# Patient Record
Sex: FEMALE | Race: WHITE | NOT HISPANIC OR LATINO | Employment: OTHER | URBAN - METROPOLITAN AREA
[De-identification: names, ages, dates, MRNs, and addresses within clinical notes are randomized per-mention and may not be internally consistent; named-entity substitution may affect disease eponyms.]

---

## 2017-02-16 ENCOUNTER — OFFICE VISIT (OUTPATIENT)
Dept: OBSTETRICS AND GYNECOLOGY | Age: 67
End: 2017-02-16

## 2017-02-16 DIAGNOSIS — Z01.419 WELL WOMAN EXAM WITH ROUTINE GYNECOLOGICAL EXAM: Primary | ICD-10-CM

## 2017-02-16 DIAGNOSIS — Z79.890 POST-MENOPAUSE ON HRT (HORMONE REPLACEMENT THERAPY): ICD-10-CM

## 2017-02-16 DIAGNOSIS — A59.01 TRICHOMONAS VAGINITIS: ICD-10-CM

## 2017-02-16 DIAGNOSIS — Z78.0 MENOPAUSE: ICD-10-CM

## 2017-02-16 DIAGNOSIS — Z11.51 SPECIAL SCREENING EXAMINATION FOR HUMAN PAPILLOMAVIRUS (HPV): ICD-10-CM

## 2017-02-16 LAB
BILIRUB BLD-MCNC: NEGATIVE MG/DL
GLUCOSE UR STRIP-MCNC: NEGATIVE MG/DL
KETONES UR QL: NEGATIVE
LEUKOCYTE EST, POC: ABNORMAL
NITRITE UR-MCNC: NEGATIVE MG/ML
PH UR: 6 [PH] (ref 5–8)
PROT UR STRIP-MCNC: NEGATIVE MG/DL
RBC # UR STRIP: ABNORMAL /UL
SP GR UR: 1.03 (ref 1–1.03)
UROBILINOGEN UR QL: NORMAL

## 2017-02-16 PROCEDURE — 99397 PER PM REEVAL EST PAT 65+ YR: CPT | Performed by: OBSTETRICS & GYNECOLOGY

## 2017-02-16 PROCEDURE — 81003 URINALYSIS AUTO W/O SCOPE: CPT | Performed by: OBSTETRICS & GYNECOLOGY

## 2017-02-16 RX ORDER — PRAVASTATIN SODIUM 40 MG
40 TABLET ORAL DAILY
COMMUNITY
End: 2021-07-24 | Stop reason: HOSPADM

## 2017-02-16 RX ORDER — ASPIRIN 81 MG/1
81 TABLET ORAL
Status: ON HOLD | COMMUNITY
End: 2018-04-22

## 2017-02-16 RX ORDER — FENOFIBRATE 160 MG/1
160 TABLET ORAL
Status: ON HOLD | COMMUNITY
End: 2018-04-22

## 2017-02-16 RX ORDER — ESTRADIOL 0.05 MG/D
PATCH TRANSDERMAL
Qty: 12 PATCH | Refills: 3 | Status: SHIPPED | OUTPATIENT
Start: 2017-02-16 | End: 2018-02-26 | Stop reason: SDUPTHER

## 2017-02-16 RX ORDER — ESTRADIOL 0.05 MG/D
PATCH TRANSDERMAL
Qty: 12 PATCH | Refills: 3 | Status: SHIPPED | OUTPATIENT
Start: 2017-02-16 | End: 2017-02-16 | Stop reason: SDUPTHER

## 2017-02-20 LAB
CYTOLOGIST CVX/VAG CYTO: NORMAL
CYTOLOGY CVX/VAG DOC THIN PREP: NORMAL
DX ICD CODE: NORMAL
DX ICD CODE: NORMAL
HIV 1 & 2 AB SER-IMP: NORMAL
HPV I/H RISK 1 DNA CVX QL PROBE+SIG AMP: NEGATIVE
OTHER STN SPEC: NORMAL
PATH REPORT.FINAL DX SPEC: NORMAL
STAT OF ADQ CVX/VAG CYTO-IMP: NORMAL

## 2017-02-21 ENCOUNTER — TELEPHONE (OUTPATIENT)
Dept: OBSTETRICS AND GYNECOLOGY | Age: 67
End: 2017-02-21

## 2017-02-21 NOTE — TELEPHONE ENCOUNTER
----- Message from Braulio Lewis MD sent at 2/20/2017  9:08 PM EST -----  Notify that the Pap smear was negative and the high risk HPV testing was negative.  Trichomonas was also seen as expected.

## 2017-03-05 PROBLEM — A59.01 TRICHOMONAS VAGINITIS: Status: ACTIVE | Noted: 2017-03-05

## 2018-01-22 ENCOUNTER — TELEPHONE (OUTPATIENT)
Dept: OBSTETRICS AND GYNECOLOGY | Age: 68
End: 2018-01-22

## 2018-02-23 ENCOUNTER — TELEPHONE (OUTPATIENT)
Dept: OBSTETRICS AND GYNECOLOGY | Age: 68
End: 2018-02-23

## 2018-02-26 RX ORDER — ESTRADIOL 0.05 MG/D
1 PATCH TRANSDERMAL WEEKLY
Qty: 12 PATCH | Refills: 3 | Status: ON HOLD | OUTPATIENT
Start: 2018-02-26 | End: 2018-04-22

## 2018-04-21 ENCOUNTER — HOSPITAL ENCOUNTER (INPATIENT)
Facility: HOSPITAL | Age: 68
LOS: 1 days | Discharge: HOME | End: 2018-04-23
Attending: EMERGENCY MEDICINE | Admitting: HOSPITALIST

## 2018-04-21 ENCOUNTER — APPOINTMENT (OUTPATIENT)
Dept: GENERAL RADIOLOGY | Facility: HOSPITAL | Age: 68
End: 2018-04-21

## 2018-04-21 DIAGNOSIS — I21.4 NON-STEMI (NON-ST ELEVATED MYOCARDIAL INFARCTION) (HCC): Primary | ICD-10-CM

## 2018-04-21 LAB
ALBUMIN SERPL-MCNC: 3.5 G/DL (ref 3.5–5.2)
ALBUMIN/GLOB SERPL: 1 G/DL
ALP SERPL-CCNC: 61 U/L (ref 39–117)
ALT SERPL W P-5'-P-CCNC: 17 U/L (ref 1–33)
ANION GAP SERPL CALCULATED.3IONS-SCNC: 12.8 MMOL/L
AST SERPL-CCNC: 16 U/L (ref 1–32)
BASOPHILS # BLD AUTO: 0.03 10*3/MM3 (ref 0–0.2)
BASOPHILS NFR BLD AUTO: 0.3 % (ref 0–1.5)
BILIRUB SERPL-MCNC: 0.2 MG/DL (ref 0.1–1.2)
BUN BLD-MCNC: 16 MG/DL (ref 8–23)
BUN/CREAT SERPL: 17.6 (ref 7–25)
CALCIUM SPEC-SCNC: 8.9 MG/DL (ref 8.6–10.5)
CHLORIDE SERPL-SCNC: 103 MMOL/L (ref 98–107)
CO2 SERPL-SCNC: 25.2 MMOL/L (ref 22–29)
CREAT BLD-MCNC: 0.91 MG/DL (ref 0.57–1)
DEPRECATED RDW RBC AUTO: 47.4 FL (ref 37–54)
EOSINOPHIL # BLD AUTO: 0.17 10*3/MM3 (ref 0–0.7)
EOSINOPHIL NFR BLD AUTO: 1.7 % (ref 0.3–6.2)
ERYTHROCYTE [DISTWIDTH] IN BLOOD BY AUTOMATED COUNT: 13.3 % (ref 11.7–13)
GFR SERPL CREATININE-BSD FRML MDRD: 61 ML/MIN/1.73
GLOBULIN UR ELPH-MCNC: 3.6 GM/DL
GLUCOSE BLD-MCNC: 185 MG/DL (ref 65–99)
HCT VFR BLD AUTO: 46.1 % (ref 35.6–45.5)
HGB BLD-MCNC: 14.8 G/DL (ref 11.9–15.5)
HOLD SPECIMEN: NORMAL
HOLD SPECIMEN: NORMAL
IMM GRANULOCYTES # BLD: 0.03 10*3/MM3 (ref 0–0.03)
IMM GRANULOCYTES NFR BLD: 0.3 % (ref 0–0.5)
LYMPHOCYTES # BLD AUTO: 2.27 10*3/MM3 (ref 0.9–4.8)
LYMPHOCYTES NFR BLD AUTO: 23.4 % (ref 19.6–45.3)
MCH RBC QN AUTO: 31.6 PG (ref 26.9–32)
MCHC RBC AUTO-ENTMCNC: 32.1 G/DL (ref 32.4–36.3)
MCV RBC AUTO: 98.3 FL (ref 80.5–98.2)
MONOCYTES # BLD AUTO: 0.58 10*3/MM3 (ref 0.2–1.2)
MONOCYTES NFR BLD AUTO: 6 % (ref 5–12)
NEUTROPHILS # BLD AUTO: 6.64 10*3/MM3 (ref 1.9–8.1)
NEUTROPHILS NFR BLD AUTO: 68.3 % (ref 42.7–76)
PLATELET # BLD AUTO: 160 10*3/MM3 (ref 140–500)
PMV BLD AUTO: 11.6 FL (ref 6–12)
POTASSIUM BLD-SCNC: 3.9 MMOL/L (ref 3.5–5.2)
PROT SERPL-MCNC: 7.1 G/DL (ref 6–8.5)
RBC # BLD AUTO: 4.69 10*6/MM3 (ref 3.9–5.2)
SODIUM BLD-SCNC: 141 MMOL/L (ref 136–145)
TROPONIN T SERPL-MCNC: <0.01 NG/ML (ref 0–0.03)
WBC NRBC COR # BLD: 9.72 10*3/MM3 (ref 4.5–10.7)
WHOLE BLOOD HOLD SPECIMEN: NORMAL
WHOLE BLOOD HOLD SPECIMEN: NORMAL

## 2018-04-21 PROCEDURE — 99285 EMERGENCY DEPT VISIT HI MDM: CPT

## 2018-04-21 PROCEDURE — 93005 ELECTROCARDIOGRAM TRACING: CPT | Performed by: EMERGENCY MEDICINE

## 2018-04-21 PROCEDURE — 84484 ASSAY OF TROPONIN QUANT: CPT | Performed by: EMERGENCY MEDICINE

## 2018-04-21 PROCEDURE — 71046 X-RAY EXAM CHEST 2 VIEWS: CPT

## 2018-04-21 PROCEDURE — 93005 ELECTROCARDIOGRAM TRACING: CPT | Performed by: NURSE PRACTITIONER

## 2018-04-21 PROCEDURE — 85025 COMPLETE CBC W/AUTO DIFF WBC: CPT | Performed by: EMERGENCY MEDICINE

## 2018-04-21 PROCEDURE — 80053 COMPREHEN METABOLIC PANEL: CPT | Performed by: EMERGENCY MEDICINE

## 2018-04-21 PROCEDURE — 93010 ELECTROCARDIOGRAM REPORT: CPT | Performed by: INTERNAL MEDICINE

## 2018-04-21 RX ORDER — ASPIRIN 325 MG
325 TABLET ORAL ONCE
Status: DISCONTINUED | OUTPATIENT
Start: 2018-04-21 | End: 2018-04-22

## 2018-04-21 RX ORDER — SODIUM CHLORIDE 0.9 % (FLUSH) 0.9 %
10 SYRINGE (ML) INJECTION AS NEEDED
Status: DISCONTINUED | OUTPATIENT
Start: 2018-04-21 | End: 2018-04-23

## 2018-04-22 PROBLEM — I21.4 NON-STEMI (NON-ST ELEVATED MYOCARDIAL INFARCTION): Status: ACTIVE | Noted: 2018-04-22

## 2018-04-22 LAB
ACT BLD: 153 SECONDS (ref 82–152)
ACT BLD: 164 SECONDS (ref 82–152)
ACT BLD: 164 SECONDS (ref 82–152)
ACT BLD: 180 SECONDS (ref 82–152)
ACT BLD: 208 SECONDS (ref 82–152)
ACT BLD: 318 SECONDS (ref 82–152)
BASOPHILS # BLD AUTO: 0.03 10*3/MM3 (ref 0–0.2)
BASOPHILS NFR BLD AUTO: 0.3 % (ref 0–1.5)
CHOLEST SERPL-MCNC: 163 MG/DL (ref 0–200)
DEPRECATED RDW RBC AUTO: 47.5 FL (ref 37–54)
EOSINOPHIL # BLD AUTO: 0.13 10*3/MM3 (ref 0–0.7)
EOSINOPHIL NFR BLD AUTO: 1.5 % (ref 0.3–6.2)
ERYTHROCYTE [DISTWIDTH] IN BLOOD BY AUTOMATED COUNT: 13.3 % (ref 11.7–13)
GLUCOSE BLDC GLUCOMTR-MCNC: 149 MG/DL (ref 70–130)
HCT VFR BLD AUTO: 45.1 % (ref 35.6–45.5)
HDLC SERPL-MCNC: 29 MG/DL (ref 40–60)
HGB BLD-MCNC: 14.2 G/DL (ref 11.9–15.5)
IMM GRANULOCYTES # BLD: 0.04 10*3/MM3 (ref 0–0.03)
IMM GRANULOCYTES NFR BLD: 0.5 % (ref 0–0.5)
LDLC SERPL CALC-MCNC: 58 MG/DL (ref 0–100)
LDLC/HDLC SERPL: 1.99 {RATIO}
LYMPHOCYTES # BLD AUTO: 3.21 10*3/MM3 (ref 0.9–4.8)
LYMPHOCYTES NFR BLD AUTO: 37.2 % (ref 19.6–45.3)
MCH RBC QN AUTO: 31 PG (ref 26.9–32)
MCHC RBC AUTO-ENTMCNC: 31.5 G/DL (ref 32.4–36.3)
MCV RBC AUTO: 98.5 FL (ref 80.5–98.2)
MONOCYTES # BLD AUTO: 0.63 10*3/MM3 (ref 0.2–1.2)
MONOCYTES NFR BLD AUTO: 7.3 % (ref 5–12)
NEUTROPHILS # BLD AUTO: 4.6 10*3/MM3 (ref 1.9–8.1)
NEUTROPHILS NFR BLD AUTO: 53.2 % (ref 42.7–76)
NT-PROBNP SERPL-MCNC: 494.5 PG/ML (ref 5–900)
PLATELET # BLD AUTO: 154 10*3/MM3 (ref 140–500)
PMV BLD AUTO: 11.3 FL (ref 6–12)
RBC # BLD AUTO: 4.58 10*6/MM3 (ref 3.9–5.2)
TRIGL SERPL-MCNC: 381 MG/DL (ref 0–150)
TROPONIN T SERPL-MCNC: 0.03 NG/ML (ref 0–0.03)
TROPONIN T SERPL-MCNC: 0.04 NG/ML (ref 0–0.03)
TROPONIN T SERPL-MCNC: 0.04 NG/ML (ref 0–0.03)
VLDLC SERPL-MCNC: 76.2 MG/DL (ref 5–40)
WBC NRBC COR # BLD: 8.64 10*3/MM3 (ref 4.5–10.7)

## 2018-04-22 PROCEDURE — 83880 ASSAY OF NATRIURETIC PEPTIDE: CPT | Performed by: HOSPITALIST

## 2018-04-22 PROCEDURE — C1725 CATH, TRANSLUMIN NON-LASER: HCPCS | Performed by: INTERNAL MEDICINE

## 2018-04-22 PROCEDURE — 84484 ASSAY OF TROPONIN QUANT: CPT | Performed by: EMERGENCY MEDICINE

## 2018-04-22 PROCEDURE — C1769 GUIDE WIRE: HCPCS | Performed by: INTERNAL MEDICINE

## 2018-04-22 PROCEDURE — 75710 ARTERY X-RAYS ARM/LEG: CPT | Performed by: INTERNAL MEDICINE

## 2018-04-22 PROCEDURE — 93010 ELECTROCARDIOGRAM REPORT: CPT | Performed by: INTERNAL MEDICINE

## 2018-04-22 PROCEDURE — 25010000002 MORPHINE SULFATE (PF) 2 MG/ML SOLUTION: Performed by: INTERNAL MEDICINE

## 2018-04-22 PROCEDURE — 92928 PRQ TCAT PLMT NTRAC ST 1 LES: CPT | Performed by: INTERNAL MEDICINE

## 2018-04-22 PROCEDURE — 25010000002 ONDANSETRON PER 1 MG: Performed by: INTERNAL MEDICINE

## 2018-04-22 PROCEDURE — C9600 PERC DRUG-EL COR STENT SING: HCPCS | Performed by: INTERNAL MEDICINE

## 2018-04-22 PROCEDURE — C1887 CATHETER, GUIDING: HCPCS | Performed by: INTERNAL MEDICINE

## 2018-04-22 PROCEDURE — B2111ZZ FLUOROSCOPY OF MULTIPLE CORONARY ARTERIES USING LOW OSMOLAR CONTRAST: ICD-10-PCS | Performed by: INTERNAL MEDICINE

## 2018-04-22 PROCEDURE — 85347 COAGULATION TIME ACTIVATED: CPT

## 2018-04-22 PROCEDURE — 25010000002 HEPARIN (PORCINE) PER 1000 UNITS: Performed by: INTERNAL MEDICINE

## 2018-04-22 PROCEDURE — 0 IOPAMIDOL PER 1 ML: Performed by: INTERNAL MEDICINE

## 2018-04-22 PROCEDURE — 83036 HEMOGLOBIN GLYCOSYLATED A1C: CPT | Performed by: INTERNAL MEDICINE

## 2018-04-22 PROCEDURE — 84484 ASSAY OF TROPONIN QUANT: CPT | Performed by: HOSPITALIST

## 2018-04-22 PROCEDURE — 85025 COMPLETE CBC W/AUTO DIFF WBC: CPT | Performed by: HOSPITALIST

## 2018-04-22 PROCEDURE — C1894 INTRO/SHEATH, NON-LASER: HCPCS | Performed by: INTERNAL MEDICINE

## 2018-04-22 PROCEDURE — 4A023N7 MEASUREMENT OF CARDIAC SAMPLING AND PRESSURE, LEFT HEART, PERCUTANEOUS APPROACH: ICD-10-PCS | Performed by: INTERNAL MEDICINE

## 2018-04-22 PROCEDURE — 93005 ELECTROCARDIOGRAM TRACING: CPT | Performed by: INTERNAL MEDICINE

## 2018-04-22 PROCEDURE — 93458 L HRT ARTERY/VENTRICLE ANGIO: CPT | Performed by: INTERNAL MEDICINE

## 2018-04-22 PROCEDURE — 027034Z DILATION OF CORONARY ARTERY, ONE ARTERY WITH DRUG-ELUTING INTRALUMINAL DEVICE, PERCUTANEOUS APPROACH: ICD-10-PCS | Performed by: INTERNAL MEDICINE

## 2018-04-22 PROCEDURE — C1874 STENT, COATED/COV W/DEL SYS: HCPCS | Performed by: INTERNAL MEDICINE

## 2018-04-22 PROCEDURE — 99222 1ST HOSP IP/OBS MODERATE 55: CPT | Performed by: INTERNAL MEDICINE

## 2018-04-22 PROCEDURE — B2151ZZ FLUOROSCOPY OF LEFT HEART USING LOW OSMOLAR CONTRAST: ICD-10-PCS | Performed by: INTERNAL MEDICINE

## 2018-04-22 PROCEDURE — 25010000002 FENTANYL CITRATE (PF) 100 MCG/2ML SOLUTION: Performed by: INTERNAL MEDICINE

## 2018-04-22 PROCEDURE — 25010000002 MIDAZOLAM PER 1 MG: Performed by: INTERNAL MEDICINE

## 2018-04-22 PROCEDURE — 82962 GLUCOSE BLOOD TEST: CPT

## 2018-04-22 PROCEDURE — 63710000001 INSULIN ASPART PER 5 UNITS: Performed by: HOSPITALIST

## 2018-04-22 PROCEDURE — 80061 LIPID PANEL: CPT | Performed by: INTERNAL MEDICINE

## 2018-04-22 DEVICE — XIENCE ALPINE EVEROLIMUS ELUTING CORONARY STENT SYSTEM 3.00 MM X 18 MM / RAPID-EXCHANGE
Type: IMPLANTABLE DEVICE | Status: FUNCTIONAL
Brand: XIENCE ALPINE

## 2018-04-22 RX ORDER — CLOPIDOGREL BISULFATE 75 MG/1
TABLET ORAL AS NEEDED
Status: DISCONTINUED | OUTPATIENT
Start: 2018-04-22 | End: 2018-04-22 | Stop reason: HOSPADM

## 2018-04-22 RX ORDER — ACETAMINOPHEN 325 MG/1
650 TABLET ORAL EVERY 4 HOURS PRN
Status: DISCONTINUED | OUTPATIENT
Start: 2018-04-22 | End: 2018-04-23

## 2018-04-22 RX ORDER — HYDROCODONE BITARTRATE AND ACETAMINOPHEN 5; 325 MG/1; MG/1
1 TABLET ORAL EVERY 4 HOURS PRN
Status: DISCONTINUED | OUTPATIENT
Start: 2018-04-22 | End: 2018-04-23

## 2018-04-22 RX ORDER — ONDANSETRON 2 MG/ML
4 INJECTION INTRAMUSCULAR; INTRAVENOUS EVERY 6 HOURS PRN
Status: DISCONTINUED | OUTPATIENT
Start: 2018-04-22 | End: 2018-04-23

## 2018-04-22 RX ORDER — FENOFIBRATE 160 MG/1
160 TABLET ORAL
Status: ON HOLD | COMMUNITY
End: 2018-04-22

## 2018-04-22 RX ORDER — CLOPIDOGREL BISULFATE 75 MG/1
600 TABLET ORAL ONCE
Status: DISCONTINUED | OUTPATIENT
Start: 2018-04-22 | End: 2018-04-23 | Stop reason: HOSPADM

## 2018-04-22 RX ORDER — ASPIRIN 81 MG/1
81 TABLET ORAL
Status: ON HOLD | COMMUNITY
End: 2018-04-22

## 2018-04-22 RX ORDER — MIDAZOLAM HYDROCHLORIDE 1 MG/ML
INJECTION INTRAMUSCULAR; INTRAVENOUS AS NEEDED
Status: DISCONTINUED | OUTPATIENT
Start: 2018-04-22 | End: 2018-04-22 | Stop reason: HOSPADM

## 2018-04-22 RX ORDER — ASPIRIN 81 MG/1
81 TABLET, CHEWABLE ORAL DAILY
Status: DISCONTINUED | OUTPATIENT
Start: 2018-04-22 | End: 2018-04-23 | Stop reason: HOSPADM

## 2018-04-22 RX ORDER — ONDANSETRON 4 MG/1
4 TABLET, FILM COATED ORAL EVERY 6 HOURS PRN
Status: DISCONTINUED | OUTPATIENT
Start: 2018-04-22 | End: 2018-04-23

## 2018-04-22 RX ORDER — PANTOPRAZOLE SODIUM 40 MG/1
40 TABLET, DELAYED RELEASE ORAL
Status: DISCONTINUED | OUTPATIENT
Start: 2018-04-22 | End: 2018-04-23 | Stop reason: HOSPADM

## 2018-04-22 RX ORDER — SODIUM CHLORIDE 9 MG/ML
125 INJECTION, SOLUTION INTRAVENOUS CONTINUOUS
Status: ACTIVE | OUTPATIENT
Start: 2018-04-22 | End: 2018-04-22

## 2018-04-22 RX ORDER — CLOPIDOGREL BISULFATE 75 MG/1
75 TABLET ORAL DAILY
Status: DISCONTINUED | OUTPATIENT
Start: 2018-04-23 | End: 2018-04-23 | Stop reason: HOSPADM

## 2018-04-22 RX ORDER — LIDOCAINE HYDROCHLORIDE 20 MG/ML
INJECTION, SOLUTION INFILTRATION; PERINEURAL AS NEEDED
Status: DISCONTINUED | OUTPATIENT
Start: 2018-04-22 | End: 2018-04-22 | Stop reason: HOSPADM

## 2018-04-22 RX ORDER — NITROGLYCERIN 0.4 MG/1
0.4 TABLET SUBLINGUAL
Status: DISCONTINUED | OUTPATIENT
Start: 2018-04-22 | End: 2018-04-23

## 2018-04-22 RX ORDER — FENTANYL CITRATE 50 UG/ML
INJECTION, SOLUTION INTRAMUSCULAR; INTRAVENOUS AS NEEDED
Status: DISCONTINUED | OUTPATIENT
Start: 2018-04-22 | End: 2018-04-22 | Stop reason: HOSPADM

## 2018-04-22 RX ORDER — HEPARIN SODIUM 1000 [USP'U]/ML
INJECTION, SOLUTION INTRAVENOUS; SUBCUTANEOUS AS NEEDED
Status: DISCONTINUED | OUTPATIENT
Start: 2018-04-22 | End: 2018-04-22 | Stop reason: HOSPADM

## 2018-04-22 RX ORDER — MORPHINE SULFATE 2 MG/ML
INJECTION, SOLUTION INTRAMUSCULAR; INTRAVENOUS AS NEEDED
Status: DISCONTINUED | OUTPATIENT
Start: 2018-04-22 | End: 2018-04-22 | Stop reason: HOSPADM

## 2018-04-22 RX ORDER — ATORVASTATIN CALCIUM 20 MG/1
40 TABLET, FILM COATED ORAL DAILY
Status: DISCONTINUED | OUTPATIENT
Start: 2018-04-22 | End: 2018-04-22

## 2018-04-22 RX ORDER — NICOTINE 21 MG/24HR
1 PATCH, TRANSDERMAL 24 HOURS TRANSDERMAL EVERY 24 HOURS
Status: DISCONTINUED | OUTPATIENT
Start: 2018-04-22 | End: 2018-04-23

## 2018-04-22 RX ORDER — ONDANSETRON 4 MG/1
4 TABLET, ORALLY DISINTEGRATING ORAL EVERY 6 HOURS PRN
Status: DISCONTINUED | OUTPATIENT
Start: 2018-04-22 | End: 2018-04-23

## 2018-04-22 RX ORDER — ESTRADIOL 0.05 MG/D
1 FILM, EXTENDED RELEASE TRANSDERMAL
Status: ON HOLD | COMMUNITY
Start: 2018-04-25 | End: 2018-04-22

## 2018-04-22 RX ORDER — ATORVASTATIN CALCIUM 20 MG/1
40 TABLET, FILM COATED ORAL NIGHTLY
Status: DISCONTINUED | OUTPATIENT
Start: 2018-04-22 | End: 2018-04-23 | Stop reason: HOSPADM

## 2018-04-22 RX ORDER — NITROGLYCERIN 5 MG/ML
INJECTION, SOLUTION INTRAVENOUS AS NEEDED
Status: DISCONTINUED | OUTPATIENT
Start: 2018-04-22 | End: 2018-04-22 | Stop reason: HOSPADM

## 2018-04-22 RX ORDER — CLOPIDOGREL BISULFATE 75 MG/1
75 TABLET ORAL DAILY
Status: DISCONTINUED | OUTPATIENT
Start: 2018-04-23 | End: 2018-04-22 | Stop reason: SDUPTHER

## 2018-04-22 RX ORDER — SODIUM CHLORIDE 9 MG/ML
250 INJECTION, SOLUTION INTRAVENOUS ONCE AS NEEDED
Status: DISCONTINUED | OUTPATIENT
Start: 2018-04-22 | End: 2018-04-23

## 2018-04-22 RX ORDER — SODIUM CHLORIDE 9 MG/ML
INJECTION, SOLUTION INTRAVENOUS CONTINUOUS PRN
Status: DISCONTINUED | OUTPATIENT
Start: 2018-04-22 | End: 2018-04-22 | Stop reason: HOSPADM

## 2018-04-22 RX ADMIN — ATORVASTATIN CALCIUM 40 MG: 20 TABLET, FILM COATED ORAL at 20:47

## 2018-04-22 RX ADMIN — ASPIRIN 81 MG: 81 TABLET, CHEWABLE ORAL at 15:27

## 2018-04-22 RX ADMIN — SODIUM CHLORIDE 125 ML/HR: 9 INJECTION, SOLUTION INTRAVENOUS at 12:56

## 2018-04-22 RX ADMIN — INSULIN ASPART 2 UNITS: 100 INJECTION, SOLUTION INTRAVENOUS; SUBCUTANEOUS at 18:45

## 2018-04-22 RX ADMIN — NITROGLYCERIN 0.4 MG: 0.4 TABLET SUBLINGUAL at 12:44

## 2018-04-22 RX ADMIN — ONDANSETRON 4 MG: 2 INJECTION INTRAMUSCULAR; INTRAVENOUS at 13:21

## 2018-04-22 RX ADMIN — PANTOPRAZOLE SODIUM 40 MG: 40 TABLET, DELAYED RELEASE ORAL at 06:08

## 2018-04-22 RX ADMIN — NITROGLYCERIN 0.4 MG: 0.4 TABLET SUBLINGUAL at 12:49

## 2018-04-22 NOTE — PLAN OF CARE
Problem: Patient Care Overview  Goal: Plan of Care Review  Outcome: Ongoing (interventions implemented as appropriate)   04/22/18 0427   Coping/Psychosocial   Plan of Care Reviewed With patient   Plan of Care Review   Progress no change   OTHER   Outcome Summary VSS, no complaints of chest pain. Labs, EKG, Cardio consult for AM. Will continue to monitor       Problem: Fall Risk (Adult)  Goal: Identify Related Risk Factors and Signs and Symptoms  Outcome: Ongoing (interventions implemented as appropriate)    Goal: Absence of Fall  Outcome: Ongoing (interventions implemented as appropriate)      Problem: Pain, Acute (Adult)  Goal: Identify Related Risk Factors and Signs and Symptoms  Outcome: Ongoing (interventions implemented as appropriate)    Goal: Acceptable Pain Control/Comfort Level  Outcome: Ongoing (interventions implemented as appropriate)

## 2018-04-22 NOTE — ED NOTES
Patient began having chest pain at 1800 this evening. That radiates down her left arm. States that it is tightness and pressure. Patient denies Shortness of breath or nausea. EMS gave patient 1 nitro and stated that chest pain went from 10/10 to 2/10.     Debo Dalton RN  04/21/18 3300

## 2018-04-22 NOTE — H&P
History and physical    Primary care physician  Dr. HOLLINS    Chief complaint   CP    History of present illness  68-year-old white female with history of diabetes mellitus hyperlipidemia gastroesophageal reflux disease who also smokes presents to [facility] emergency room with left-sided chest pain while she was watching TV associated with shortness of breath nausea vomiting diaphoresis and pain lasted for almost an hour and relived with nitroglycerin in ER.  Patient pain also radiated to left arm.  Patient evaluated in ER found to have indeterminate troponin level with EKG changes admitted for management.  Patient denies any fever cough night sweats or weight loss.  Patient also denies any abdominal pain diarrhea    PAST MEDICAL HISTORY    • Atelectasis     • Breast nodule     • COPD (chronic obstructive pulmonary disease)     • Diabetes mellitus     • Dyspareunia in female     • Elevated cholesterol     • Herpes genitalis     • Hiatal hernia     • High cholesterol     • History of Papanicolaou smear of cervix 2013   • Hormone replacement therapy (postmenopausal)     • HPV in female     • Hypertriglyceridemia     • Ovarian cystadenoma     • Pulmonary vascular congestion     • Senile (atrophic) vaginitis     • Soft tissue tumor     • Trichomonal vaginitis     • Type 2 diabetes mellitus     • Uterine leiomyoma        PAST SURGICAL HISTORY  Surgical History         Past Surgical History:   Procedure Laterality Date   • APPENDECTOMY       • DILATATION AND CURETTAGE       • ESSURE TUBAL LIGATION       • TONSILLECTOMY       • TOTAL ABDOMINAL HYSTERECTOMY Bilateral 2006     ASHLEE, BSO. Pathology showed serous cystadenoma/cystadenofibroma , bilaterally of both tubes with focal paratubal cyst. Severe Acute and Chronic Cervicitis and endocervicitis with reactive chage and focal features suggestive of HPV cystopathic effect. Disordered proliferative phase endometrium. Adenomyosis and Leiomyomas.          FAMILY HISTORY          Family History   Problem Relation Age of Onset   • Stroke Mother         Cerebral Infarction    • Diabetes type II Mother         Mellitus    • Melanoma Father         Malignant    • Chronic granulomatous disease Brother         Wegener's Granolomatosis    • Diabetes type I Brother     • Breast cancer Maternal Aunt 60   • No Known Problems Maternal Grandmother     • No Known Problems Paternal Grandmother     • No Known Problems Paternal Aunt     • Cancer Maternal Grandfather     • No Known Problems Paternal Grandfather     • BRCA 1/2 Neg Hx     • Colon cancer Neg Hx     • Endometrial cancer Neg Hx     • Ovarian cancer Neg Hx        SOCIAL HISTORY  Social History   Social History            Social History   • Marital status: Significant Other       Spouse name: GRANT MARIN   • Number of children: 0   • Years of education: N/A          Occupational History   • Not on file.            Social History Main Topics   • Smoking status: Current Every Day Smoker       Packs/day: 1.00       Types: Cigarettes   • Smokeless tobacco: Never Used   • Alcohol use No   • Drug use: No   • Sexual activity: Yes       Partners: Male           Other Topics Concern   • Not on file          Social History Narrative   • No narrative on file         ALLERGIES  Clindamycin/lincomycin and Flagyl [metronidazole]     REVIEW OF SYSTEMS  Review of Systems   Constitutional: Positive for diaphoresis. Negative for fever.   HENT: Negative for sore throat.    Eyes: Negative.    Respiratory: Negative for cough and shortness of breath.    Cardiovascular: Positive for chest pain. Negative for leg swelling.   Gastrointestinal: Positive for nausea. Negative for abdominal pain, diarrhea and vomiting.   Genitourinary: Negative for dysuria.   Musculoskeletal: Negative for myalgias and neck pain.   Skin: Negative for rash.   Allergic/Immunologic: Negative.    Neurological: Negative for weakness, numbness and headaches.   Hematological: Negative.   "  Psychiatric/Behavioral: Negative.    All other systems reviewed and are negative.     PHYSICAL EXAM  Blood pressure 114/68, pulse 68, temperature 98 °F (36.7 °C), temperature source Oral, resp. rate 18, height 165.1 cm (65\"), weight 76.7 kg (169 lb), SpO2 92 %.    Constitutional: She is oriented to person, place, and time and well-developed, well-nourished, and in no distress. No distress.   Head: Normocephalic and atraumatic.   Eyes: EOM are normal. Pupils are equal, round, and reactive to light.   Neck: Normal range of motion. Neck supple.   Cardiovascular: Normal rate, regular rhythm and normal heart sounds.  Exam reveals no gallop.    No murmur heard.  Pulmonary/Chest: Effort normal and breath sounds normal. No respiratory distress. She has no wheezes. She has no rhonchi. She has no rales. She exhibits no tenderness.   Abdominal: Soft. There is no tenderness. There is no rebound and no guarding.   Musculoskeletal: Normal range of motion. She exhibits no edema, tenderness or deformity.   Neurological: She is alert and oriented to person, place, and time. She has normal sensation and normal strength.   No focal neuro deficits   Skin: Skin is warm and dry. No rash noted.   Psychiatric: Mood and affect normal.     LAB RESULTS  Lab Results (last 24 hours)     Procedure Component Value Units Date/Time    POC Activated Clotting Time [[id number]]  (Abnormal) Collected:  04/22/18 1405    Specimen:  Blood Updated:  04/22/18 1420     Activated Clotting Time  208 (H) Seconds      Comment: Serial Number: [id number]:  [id number]       Lipid Panel [[id number]]  (Abnormal) Collected:  04/22/18 0602    Specimen:  Blood Updated:  04/22/18 1017     Total Cholesterol 163 mg/dL      Triglycerides 381 (H) mg/dL      HDL Cholesterol 29 (L) mg/dL      LDL Cholesterol  58 mg/dL      VLDL Cholesterol 76.2 (H) mg/dL      LDL/HDL Ratio 1.99    Narrative:       Cholesterol Reference Ranges  (U.S. Department of Health and Human Services ATP " III Classifications)    Desirable          <200 mg/dL  Borderline High    200-239 mg/dL  High Risk          >240 mg/dL      Triglyceride Reference Ranges  (U.S. Department of Health and Human Services ATP III Classifications)    Normal           <150 mg/dL  Borderline High  150-199 mg/dL  High             200-499 mg/dL  Very High        >500 mg/dL    HDL Reference Ranges  (U.S. Department of Health and Human Services ATP III Classifcations)    Low     <40 mg/dl (major risk factor for CHD)  High    >60 mg/dl ('negative' risk factor for CHD)        LDL Reference Ranges  (U.S. Department of Health and Human Services ATP III Classifcations)    Optimal          <100 mg/dL  Near Optimal     100-129 mg/dL  Borderline High  130-159 mg/dL  High             160-189 mg/dL  Very High        >189 mg/dL    Troponin [[id number]]  (Abnormal) Collected:  04/22/18 0602    Specimen:  Blood Updated:  04/22/18 0656     Troponin T 0.041 (H) ng/mL     Narrative:       Troponin T Reference Ranges:  Less than 0.03 ng/mL:    Negative for AMI  0.03 to 0.09 ng/mL:      Indeterminant for AMI  Greater than 0.09 ng/mL: Positive for AMI    BNP [[id number]]  (Normal) Collected:  04/22/18 0602    Specimen:  Blood Updated:  04/22/18 0647     proBNP 494.5 pg/mL     Narrative:       Among patients with dyspnea, NT-proBNP is highly sensitive for the detection of acute congestive heart failure. In addition NT-proBNP of <300 pg/ml effectively rules out acute congestive heart failure with 99% negative predictive value.    CBC & Differential [[id number]] Collected:  04/22/18 0602    Specimen:  Blood Updated:  04/22/18 0622    Narrative:       The following orders were created for panel order CBC & Differential.  Procedure                               Abnormality         Status                     ---------                               -----------         ------                     CBC Auto Differential[[id number]]        Abnormal            Final result                  Please view results for these tests on the individual orders.    CBC Auto Differential [[id number]]  (Abnormal) Collected:  04/22/18 0602    Specimen:  Blood Updated:  04/22/18 0622     WBC 8.64 10*3/mm3      RBC 4.58 10*6/mm3      Hemoglobin 14.2 g/dL      Hematocrit 45.1 %      MCV 98.5 (H) fL      MCH 31.0 pg      MCHC 31.5 (L) g/dL      RDW 13.3 (H) %      RDW-SD 47.5 fl      MPV 11.3 fL      Platelets 154 10*3/mm3      Neutrophil % 53.2 %      Lymphocyte % 37.2 %      Monocyte % 7.3 %      Eosinophil % 1.5 %      Basophil % 0.3 %      Immature Grans % 0.5 %      Neutrophils, Absolute 4.60 10*3/mm3      Lymphocytes, Absolute 3.21 10*3/mm3      Monocytes, Absolute 0.63 10*3/mm3      Eosinophils, Absolute 0.13 10*3/mm3      Basophils, Absolute 0.03 10*3/mm3      Immature Grans, Absolute 0.04 (H) 10*3/mm3     Troponin [[id number]]  (Abnormal) Collected:  04/22/18 0110    Specimen:  Blood Updated:  04/22/18 0143     Troponin T 0.040 (H) ng/mL     Narrative:       Troponin T Reference Ranges:  Less than 0.03 ng/mL:    Negative for AMI  0.03 to 0.09 ng/mL:      Indeterminant for AMI  Greater than 0.09 ng/mL: Positive for AMI    [place] Draw [[id number]] Collected:  04/21/18 2230    Specimen:  Blood Updated:  04/21/18 2345    Narrative:       The following orders were created for panel order [place] Draw.  Procedure                               Abnormality         Status                     ---------                               -----------         ------                     Light Blue Top[[id number]]                                   Final result               Green Top (Gel)[[id number]]                                  Final result               Lavender Top[[id number]]                                     Final result               Gold Top - SST[[id number]]                                   Final result                 Please view results for these tests on the individual orders.    Light Blue Top [[id number]]  Collected:  04/21/18 2230    Specimen:  Blood Updated:  04/21/18 2345     Extra Tube hold for add-on     Comment: Auto resulted       Green Top (Gel) [[id number]] Collected:  04/21/18 2230    Specimen:  Blood Updated:  04/21/18 2330     Extra Tube Hold for add-ons.     Comment: Auto resulted.       Lavender Top [[id number]] Collected:  04/21/18 2230    Specimen:  Blood Updated:  04/21/18 2330     Extra Tube hold for add-on     Comment: Auto resulted       Gold Top - SST [[id number]] Collected:  04/21/18 2230    Specimen:  Blood Updated:  04/21/18 2330     Extra Tube Hold for add-ons.     Comment: Auto resulted.       Comprehensive Metabolic Panel [[id number]]  (Abnormal) Collected:  04/21/18 2230    Specimen:  Blood Updated:  04/21/18 2316     Glucose 185 (H) mg/dL      BUN 16 mg/dL      Creatinine 0.91 mg/dL      Sodium 141 mmol/L      Potassium 3.9 mmol/L      Chloride 103 mmol/L      CO2 25.2 mmol/L      Calcium 8.9 mg/dL      Total Protein 7.1 g/dL      Albumin 3.50 g/dL      ALT (SGPT) 17 U/L      AST (SGOT) 16 U/L      Alkaline Phosphatase 61 U/L      Total Bilirubin 0.2 mg/dL      eGFR Non African Amer 61 mL/min/1.73      Globulin 3.6 gm/dL      A/G Ratio 1.0 g/dL      BUN/Creatinine Ratio 17.6     Anion Gap 12.8 mmol/L     Troponin [[id number]]  (Normal) Collected:  04/21/18 2230    Specimen:  Blood Updated:  04/21/18 2316     Troponin T <0.010 ng/mL     Narrative:       Troponin T Reference Ranges:  Less than 0.03 ng/mL:    Negative for AMI  0.03 to 0.09 ng/mL:      Indeterminant for AMI  Greater than 0.09 ng/mL: Positive for AMI    CBC & Differential [[id number]] Collected:  04/21/18 2230    Specimen:  Blood Updated:  04/21/18 2257    Narrative:       The following orders were created for panel order CBC & Differential.  Procedure                               Abnormality         Status                     ---------                               -----------         ------                     CBC Auto  Differential[[id number]]        Abnormal            Final result                 Please view results for these tests on the individual orders.    CBC Auto Differential [[id number]]  (Abnormal) Collected:  04/21/18 2230    Specimen:  Blood Updated:  04/21/18 2257     WBC 9.72 10*3/mm3      RBC 4.69 10*6/mm3      Hemoglobin 14.8 g/dL      Hematocrit 46.1 (H) %      MCV 98.3 (H) fL      MCH 31.6 pg      MCHC 32.1 (L) g/dL      RDW 13.3 (H) %      RDW-SD 47.4 fl      MPV 11.6 fL      Platelets 160 10*3/mm3      Neutrophil % 68.3 %      Lymphocyte % 23.4 %      Monocyte % 6.0 %      Eosinophil % 1.7 %      Basophil % 0.3 %      Immature Grans % 0.3 %      Neutrophils, Absolute 6.64 10*3/mm3      Lymphocytes, Absolute 2.27 10*3/mm3      Monocytes, Absolute 0.58 10*3/mm3      Eosinophils, Absolute 0.17 10*3/mm3      Basophils, Absolute 0.03 10*3/mm3      Immature Grans, Absolute 0.03 10*3/mm3         Imaging Results (last 24 hours)     Procedure Component Value Units Date/Time    XR Chest 2 View [[id number]] Collected:  04/21/18 2246     Updated:  04/21/18 2246    Narrative:       CHEST PA AND LATERAL.     HISTORY: Chest pain.     COMPARISON: No prior studies for comparison.     FINDINGS:  Cardiomediastinal silhouette is within normal limits.         There is no consolidation or effusion.              Impression:       No acute findings.               EKG                              Rhythm/Rate: nsr, 68  P waves and [place]: normal  QRS, axis: normal  ST and T waves: normal       Current Facility-Administered Medications:   •  acetaminophen (TYLENOL) tablet 650 mg, 650 mg, Oral, Q4H PRN, Srikanth Rod MD  •  aspirin chewable tablet 81 mg, 81 mg, Oral, Daily, Srikanth Rod MD, 81 mg at 04/22/18 1527  •  atorvastatin (LIPITOR) tablet 40 mg, 40 mg, Oral, Nightly, Srikanth Rod MD  •  atropine sulfate injection 0.5-1 mg, 0.5-1 mg, Intravenous, Q5 Min PRN, Srikanth Rod MD  •  clopidogrel (PLAVIX) tablet 600 mg, 600 mg, Oral,  Once **AND** [START ON 4/23/2018] clopidogrel (PLAVIX) tablet 75 mg, 75 mg, Oral, Daily, Srikanth Rod MD  •  HYDROcodone-acetaminophen (NORCO) 5-325 MG per tablet 1 tablet, 1 tablet, Oral, Q4H PRN, Srikanth Rod MD  •  insulin aspart (novoLOG) injection 0-7 Units, 0-7 Units, Subcutaneous, 4x Daily With Meals & Nightly, Isac Horvath MD  •  magnesium hydroxide (MILK OF MAGNESIA) suspension 2400 mg/10mL 10 mL, 10 mL, Oral, Daily PRN, Srikanth Rod MD  •  nicotine (NICODERM CQ) 21 MG/24HR patch 1 patch, 1 patch, Transdermal, Q24H, Srikanth Rod MD  •  nitroglycerin (NITROSTAT) ointment 0.5 inch, 0.5 inch, Topical, Q6H, Ac Bansal III, MD  •  nitroglycerin (NITROSTAT) SL tablet 0.4 mg, 0.4 mg, Sublingual, Q5 Min PRN, Isac Horvath MD, 0.4 mg at 04/22/18 1249  •  ondansetron (ZOFRAN) tablet 4 mg, 4 mg, Oral, Q6H PRN **OR** ondansetron ODT (ZOFRAN-ODT) disintegrating tablet 4 mg, 4 mg, Oral, Q6H PRN **OR** ondansetron (ZOFRAN) injection 4 mg, 4 mg, Intravenous, Q6H PRN, Srikanth Rod MD, 4 mg at 04/22/18 1321  •  pantoprazole (PROTONIX) EC tablet 40 mg, 40 mg, Oral, Q AM, Isac Horvath MD, 40 mg at 04/22/18 0608  •  sodium chloride 0.9 % flush 10 mL, 10 mL, Intravenous, PRN, Chauncey Emmanuel MD  •  sodium chloride 0.9 % infusion 250 mL, 250 mL, Intravenous, Once PRN, Srikanth Rod MD  •  sodium chloride 0.9 % infusion, 125 mL/hr, Intravenous, Continuous, Srikanth Rod MD, Last Rate: 125 mL/hr at 04/22/18 1256, 125 mL/hr at 04/22/18 1256     ASSESSMENT  Chest pain with multiple coronary risk factors with indeterminate troponin level and EKG changes probably acute coronary syndrome  Diabetes mellitus  Hypertension  Hyperlipidemia  Tobacco abuse  Gastroesophageal reflux disease    PLAN  Admit  Oxygen nitroglycerin aspirin  Serial cardiac enzymes and EKG  Cardiology consult  Continue home medication  Stress ulcer DVT prophylaxis  Supportive care  Follow closely further recommendation chronic hospital  rommel BEAR MD

## 2018-04-22 NOTE — ED PROVIDER NOTES
EMERGENCY DEPARTMENT ENCOUNTER    CHIEF COMPLAINT  Chief Complaint: Chest pain  History given by: Pt  History limited by: Nothing  Room Number: 14/14  PMD: Raymond Simon MD      HPI:  Pt is a 68 y.o. female who presents complaining of intermittent L sided CP over the last two days with her most recent episode starting today at 1800 that lasted for one hour before resolving. She reports that her episode tonight occurred while at rest watching TV. She reports that her CP radiates into her LUE. Pt also reports nausea and diaphoresis. She denies SOA or leg pain/swelling. Pt reports a hx of diabetes and smoking.     Duration:  One hour  Onset: gradual  Timing: intermittent  Location: L chest  Radiation: into LUE  Quality: pain  Intensity/Severity: mild to moderate  Progression: resolved  Associated Symptoms: nausea, diaphoresis  Aggravating Factors: none  Alleviating Factors: none  Previous Episodes: Pt reports a hx of diabetes and smoking  Treatment before arrival: None stated PTA    PAST MEDICAL HISTORY  Active Ambulatory Problems     Diagnosis Date Noted   • Trichomonas vaginitis 03/05/2017     Resolved Ambulatory Problems     Diagnosis Date Noted   • No Resolved Ambulatory Problems     Past Medical History:   Diagnosis Date   • Atelectasis    • Breast nodule    • COPD (chronic obstructive pulmonary disease)    • Diabetes mellitus    • Dyspareunia in female    • Elevated cholesterol    • Herpes genitalis    • Hiatal hernia    • High cholesterol    • History of Papanicolaou smear of cervix 2013   • Hormone replacement therapy (postmenopausal)    • HPV in female    • Hypertriglyceridemia    • Ovarian cystadenoma    • Pulmonary vascular congestion    • Senile (atrophic) vaginitis    • Soft tissue tumor    • Trichomonal vaginitis    • Type 2 diabetes mellitus    • Uterine leiomyoma        PAST SURGICAL HISTORY  Past Surgical History:   Procedure Laterality Date   • APPENDECTOMY     • DILATATION AND CURETTAGE      • ESSURE TUBAL LIGATION     • TONSILLECTOMY     • TOTAL ABDOMINAL HYSTERECTOMY Bilateral 2006    ASHLEE, BSO. Pathology showed serous cystadenoma/cystadenofibroma , bilaterally of both tubes with focal paratubal cyst. Severe Acute and Chronic Cervicitis and endocervicitis with reactive chage and focal features suggestive of HPV cystopathic effect. Disordered proliferative phase endometrium. Adenomyosis and Leiomyomas.        FAMILY HISTORY  Family History   Problem Relation Age of Onset   • Stroke Mother      Cerebral Infarction    • Diabetes type II Mother      Mellitus    • Melanoma Father      Malignant    • Chronic granulomatous disease Brother      Wegener's Granolomatosis    • Diabetes type I Brother    • Breast cancer Maternal Aunt 60   • No Known Problems Maternal Grandmother    • No Known Problems Paternal Grandmother    • No Known Problems Paternal Aunt    • Cancer Maternal Grandfather    • No Known Problems Paternal Grandfather    • BRCA 1/2 Neg Hx    • Colon cancer Neg Hx    • Endometrial cancer Neg Hx    • Ovarian cancer Neg Hx        SOCIAL HISTORY  Social History     Social History   • Marital status: Significant Other     Spouse name: GRANT MARIN   • Number of children: 0   • Years of education: N/A     Occupational History   • Not on file.     Social History Main Topics   • Smoking status: Current Every Day Smoker     Packs/day: 1.00     Types: Cigarettes   • Smokeless tobacco: Never Used   • Alcohol use No   • Drug use: No   • Sexual activity: Yes     Partners: Male     Other Topics Concern   • Not on file     Social History Narrative   • No narrative on file       ALLERGIES  Clindamycin/lincomycin and Flagyl [metronidazole]    REVIEW OF SYSTEMS  Review of Systems   Constitutional: Positive for diaphoresis. Negative for fever.   HENT: Negative for sore throat.    Eyes: Negative.    Respiratory: Negative for cough and shortness of breath.    Cardiovascular: Positive for chest pain. Negative for leg  swelling.   Gastrointestinal: Positive for nausea. Negative for abdominal pain, diarrhea and vomiting.   Genitourinary: Negative for dysuria.   Musculoskeletal: Negative for myalgias and neck pain.   Skin: Negative for rash.   Allergic/Immunologic: Negative.    Neurological: Negative for weakness, numbness and headaches.   Hematological: Negative.    Psychiatric/Behavioral: Negative.    All other systems reviewed and are negative.      PHYSICAL EXAM  ED Triage Vitals [04/21/18 2220]   Temp Heart Rate Resp BP SpO2   98.1 °F (36.7 °C) 71 16 121/73 97 %      Temp src Heart Rate Source Patient Position BP Location FiO2 (%)   Oral -- -- -- --       Physical Exam   Constitutional: She is oriented to person, place, and time and well-developed, well-nourished, and in no distress. No distress.   HENT:   Head: Normocephalic and atraumatic.   Eyes: EOM are normal. Pupils are equal, round, and reactive to light.   Neck: Normal range of motion. Neck supple.   Cardiovascular: Normal rate, regular rhythm and normal heart sounds.  Exam reveals no gallop.    No murmur heard.  Pulmonary/Chest: Effort normal and breath sounds normal. No respiratory distress. She has no wheezes. She has no rhonchi. She has no rales. She exhibits no tenderness.   Abdominal: Soft. There is no tenderness. There is no rebound and no guarding.   Musculoskeletal: Normal range of motion. She exhibits no edema, tenderness or deformity.   Neurological: She is alert and oriented to person, place, and time. She has normal sensation and normal strength.   No focal neuro deficits   Skin: Skin is warm and dry. No rash noted.   Psychiatric: Mood and affect normal.   Nursing note and vitals reviewed.      LAB RESULTS  Lab Results (last 24 hours)     Procedure Component Value Units Date/Time    CBC & Differential [[id number]] Collected:  04/21/18 2230    Specimen:  Blood Updated:  04/21/18 2257    Narrative:       The following orders were created for panel order CBC &  Differential.  Procedure                               Abnormality         Status                     ---------                               -----------         ------                     CBC Auto Differential[[id number]]        Abnormal            Final result                 Please view results for these tests on the individual orders.    Comprehensive Metabolic Panel [[id number]]  (Abnormal) Collected:  04/21/18 2230    Specimen:  Blood Updated:  04/21/18 2316     Glucose 185 (H) mg/dL      BUN 16 mg/dL      Creatinine 0.91 mg/dL      Sodium 141 mmol/L      Potassium 3.9 mmol/L      Chloride 103 mmol/L      CO2 25.2 mmol/L      Calcium 8.9 mg/dL      Total Protein 7.1 g/dL      Albumin 3.50 g/dL      ALT (SGPT) 17 U/L      AST (SGOT) 16 U/L      Alkaline Phosphatase 61 U/L      Total Bilirubin 0.2 mg/dL      eGFR Non African Amer 61 mL/min/1.73      Globulin 3.6 gm/dL      A/G Ratio 1.0 g/dL      BUN/Creatinine Ratio 17.6     Anion Gap 12.8 mmol/L     Troponin [[id number]]  (Normal) Collected:  04/21/18 2230    Specimen:  Blood Updated:  04/21/18 2316     Troponin T <0.010 ng/mL     Narrative:       Troponin T Reference Ranges:  Less than 0.03 ng/mL:    Negative for AMI  0.03 to 0.09 ng/mL:      Indeterminant for AMI  Greater than 0.09 ng/mL: Positive for AMI    CBC Auto Differential [[id number]]  (Abnormal) Collected:  04/21/18 2230    Specimen:  Blood Updated:  04/21/18 2257     WBC 9.72 10*3/mm3      RBC 4.69 10*6/mm3      Hemoglobin 14.8 g/dL      Hematocrit 46.1 (H) %      MCV 98.3 (H) fL      MCH 31.6 pg      MCHC 32.1 (L) g/dL      RDW 13.3 (H) %      RDW-SD 47.4 fl      MPV 11.6 fL      Platelets 160 10*3/mm3      Neutrophil % 68.3 %      Lymphocyte % 23.4 %      Monocyte % 6.0 %      Eosinophil % 1.7 %      Basophil % 0.3 %      Immature Grans % 0.3 %      Neutrophils, Absolute 6.64 10*3/mm3      Lymphocytes, Absolute 2.27 10*3/mm3      Monocytes, Absolute 0.58 10*3/mm3      Eosinophils, Absolute 0.17  10*3/mm3      Basophils, Absolute 0.03 10*3/mm3      Immature Grans, Absolute 0.03 10*3/mm3     Troponin [[id number]]  (Abnormal) Collected:  04/22/18 0110    Specimen:  Blood Updated:  04/22/18 0143     Troponin T 0.040 (H) ng/mL     Narrative:       Troponin T Reference Ranges:  Less than 0.03 ng/mL:    Negative for AMI  0.03 to 0.09 ng/mL:      Indeterminant for AMI  Greater than 0.09 ng/mL: Positive for AMI          I ordered the above labs and reviewed the results    RADIOLOGY  XR Chest 2 View   Preliminary Result   No acute findings.                   I ordered the above noted radiological studies. Interpreted by radiologist. Reviewed by me in PACS.       PROCEDURES  Procedures    EKG           EKG time: 2221  Rhythm/Rate: nsr, 68  P waves and GA: normal  QRS, axis: normal  ST and T waves: normal     Interpreted Contemporaneously by me, independently viewed  No prior records for comparison      PROGRESS AND CONSULTS  ED Course     2319 - Repeat troponin ordered.     0147 - Consult placed with [facility].    0148 - Rechecked pt. Pt is resting comfortably in NAD. Informed pt of the result of her lab work which shows a negative first troponin and positive second troponin. D/w pt the plans for admission. Pt understands and agrees with plan. All questions answered.     0153 - Discussed pt care with Dr. Horvath ([facility]) who agrees to admit the pt.      MEDICAL DECISION MAKING  Results were reviewed/discussed with the patient and they were also made aware of online access. Pt also made aware that some labs, such as cultures, will not be resulted during ER visit and follow up with PMD is necessary.     MDM  Number of Diagnoses or Management Options  Non-STEMI (non-ST elevated myocardial infarction):      Amount and/or Complexity of Data Reviewed  Clinical lab tests: ordered and reviewed (First troponin - negative  Second Troponin - 0.040)  Tests in the radiology section of CPT®: ordered and reviewed (CXR - no acute  findings)  Tests in the medicine section of CPT®: reviewed and ordered (See EKG procedure note.)  Discuss the patient with other providers: yes (Dr. Horvath ([facility]))           DIAGNOSIS  Final diagnoses:   Non-STEMI (non-ST elevated myocardial infarction)       DISPOSITION  ADMISSION    Discussed treatment plan and reason for admission with pt/family and admitting physician.  Pt/family voiced understanding of the plan for admission for further testing/treatment as needed.         Latest Documented Vital Signs:  As of 2:03 AM  BP- 121/73 HR- 71 Temp- 98.1 °F (36.7 °C) (Oral) O2 sat- 97%    --  Documentation assistance provided by heath Krishnamurthy for Dr. Emmanuel.  Information recorded by the scribe was done at my direction and has been verified and validated by me.        Elio Krishnamurthy  04/22/18 0156       Elio Krishnamurthy  04/22/18 0204       Chauncey Emmanuel MD  04/22/18 0788

## 2018-04-22 NOTE — CONSULTS
Patient Name: Tonya Mccall  :1950  68 y.o.    Date of Admission: 2018  Date of Consultation:  18  Encounter Provider: Ac Bansal III, MD  Place of Service: [facility] CARDIOLOGY  Referring Provider: Isac Horvath MD  Patient Care Team:  Raymond Simon MD as PCP - General (Internal Medicine)  Raymond Simon MD as Consulting Physician (Internal Medicine)      Chief complaint: Chest Pain    History of Present Illness:   Ms. Tonya Mccall is a 68 year old female patient with a history of DM2, HTN, HLD, uterine leiomyoma, hiatal hernia and COPD. She is a current every day smoker.      She presented to the emergency room with complaint of chest discomfort.  2 days ago she had an episode of mid heavy precordial chest discomfort that radiated into her left arm.  She experienced nausea with that.  This occurred at rest.  Lately she's been having worsening fatigue with any activity.  She did not throw up and she did not have any shortness of breath when she had the experience of chest pain 2 days ago.    The day of admission she experienced a much heavier crushing mid substernal chest pain that rated at her left arm.  She became profusely diaphoretic and had severe nausea, and took 2 aspirin but it did not get better.  EMS was called and they gave her nitroglycerin which promptly relieved her chest discomfort.  Overnight she had no further chest discomfort.    While she has multiple cardiac risk factors as outlined above she has not previously had any cardiac history.  This patient has no history of coronary artery disease, congestive heart failure, rheumatic fever, rheumatic heart disease, congenital heart disease or heart murmur.  This patient has never required invasive cardiovascular evaluation.    We were consulted for NSTEMI with cTn 0.01, 0.04, 0.04.    Past Medical History:   Diagnosis Date   • Atelectasis     right   • Breast nodule    • COPD  (chronic obstructive pulmonary disease)    • Diabetes mellitus    • Dyspareunia in female    • Elevated cholesterol    • Herpes genitalis    • Hiatal hernia    • High cholesterol    • History of Papanicolaou smear of cervix 2013    Pap smears hve all been normal since 2004.  They frequently have shown Trichomonas infections to be present.  2013, normal Pap smear, negative HR-HPV.  Next pap 2016.    • Hormone replacement therapy (postmenopausal)    • HPV in female    • Hypertriglyceridemia    • Ovarian cystadenoma    • Pulmonary vascular congestion    • Senile (atrophic) vaginitis    • Soft tissue tumor     NECK   • Trichomonal vaginitis    • Type 2 diabetes mellitus    • Uterine leiomyoma        Past Surgical History:   Procedure Laterality Date   • APPENDECTOMY     • DILATATION AND CURETTAGE     • ESSURE TUBAL LIGATION     • TONSILLECTOMY     • TOTAL ABDOMINAL HYSTERECTOMY Bilateral 2006    ASHLEE, BSO. Pathology showed serous cystadenoma/cystadenofibroma , bilaterally of both tubes with focal paratubal cyst. Severe Acute and Chronic Cervicitis and endocervicitis with reactive chage and focal features suggestive of HPV cystopathic effect. Disordered proliferative phase endometrium. Adenomyosis and Leiomyomas.          Prior to Admission medications    Medication Sig Start Date End Date Taking? Authorizing Provider   aspirin 81 MG EC tablet Take 81 mg by mouth.    Historical Provider, MD   aspirin 81 MG EC tablet Take 81 mg by mouth.    Historical Provider, MD   estradiol (CLIMARA) 0.05 MG/24HR patch Place 1 patch on the skin 1 (One) Time Per Week. 2/26/18   Braulio Lewis MD   estradiol (MINIVELLE, VIVELLE-DOT) 0.05 MG/24HR patch Place 1 patch on the skin.    Historical Provider, MD   fenofibrate 160 MG tablet Take 160 mg by mouth.    Historical Provider, MD   fenofibrate 160 MG tablet Take 160 mg by mouth.    Historical Provider, MD   LISINOPRIL PO Take 5 mg by mouth.    Historical Provider, MD   LISINOPRIL PO  Take 5 mg by mouth.    Historical Provider, MD   metFORMIN (GLUCOPHAGE) 500 MG tablet Take 500 mg by mouth.    Historical Provider, MD   pravastatin (PRAVACHOL) 10 MG tablet Take 20 mg by mouth.    Historical Provider, MD       Allergies   Allergen Reactions   • Clindamycin/Lincomycin Swelling   • Flagyl [Metronidazole] Swelling     Swelling in face       Social History     Social History   • Marital status: Significant Other     Spouse name: GRANT MARIN   • Number of children: 0     Social History Main Topics   • Smoking status: Current Every Day Smoker     Packs/day: 1.00     Types: Cigarettes   • Smokeless tobacco: Never Used   • Alcohol use No   • Drug use: No   • Sexual activity: Yes     Partners: Male     Other Topics Concern   • Not on file       Family History   Problem Relation Age of Onset   • Stroke Mother      Cerebral Infarction    • Diabetes type II Mother      Mellitus    • Melanoma Father      Malignant    • Chronic granulomatous disease Brother      Wegener's Granolomatosis    • Diabetes type I Brother    • Breast cancer Maternal Aunt 60   • No Known Problems Maternal Grandmother    • No Known Problems Paternal Grandmother    • No Known Problems Paternal Aunt    • Cancer Maternal Grandfather    • No Known Problems Paternal Grandfather    • BRCA 1/2 Neg Hx    • Colon cancer Neg Hx    • Endometrial cancer Neg Hx    • Ovarian cancer Neg Hx        REVIEW OF SYSTEMS:   All systems reviewed.  Pertinent positives identified in HPI.  All other systems are negative.      Objective:     Vitals:    04/22/18 0338 04/22/18 0339 04/22/18 0340 04/22/18 0733   BP: 137/83   115/77   BP Location:    Right arm   Patient Position:    Lying   Pulse: 70 74 70 73   Resp:    16   Temp:    98 °F (36.7 °C)   TempSrc:    Oral   SpO2: 97% 97% 96% 92%   Weight:       Height:         Body mass index is 28.12 kg/m².    Physical Exam:  General Appearance:    Alert, cooperative, in no acute distress   Head:    Normocephalic, without  obvious abnormality, atraumatic   Eyes:            Lids and lashes normal, conjunctivae and sclerae normal, no   icterus, no pallor, corneas clear, PERRLA   Ears:    Ears appear intact with no abnormalities noted   Throat:   No oral lesions, no thrush, oral mucosa moist   Neck:   No adenopathy, supple, trachea midline, no thyromegaly, no   carotid bruit, no JVD   Back:     No kyphosis present, no scoliosis present, no skin lesions, erythema or scars, no tenderness to percussion or palpation, range of motion normal   Lungs:     Clear to auscultation,respirations regular, even and unlabored    Heart:    Regular rhythm and normal rate, normal S1 and S2, no murmur, no gallop, no rub, no click   Chest Wall:    No abnormalities observed   Abdomen:     Normal bowel sounds, no masses, no organomegaly, soft        non-tender, non-distended, no guarding, no rebound  tenderness   Extremities:   Moves all extremities well, no edema, no cyanosis, no redness   Pulses:   Pulses palpable and equal bilaterally. Normal radial, carotid, femoral, dorsalis pedis and posterior tibial pulses bilaterally. Normal abdominal aorta   Skin:  Psychiatric:   No bleeding, bruising or rash    Alert and oriented x 3, normal mood and affect         Lab Review:       Results from last 7 days  Lab Units 04/21/18  2230   SODIUM mmol/L 141   POTASSIUM mmol/L 3.9   CHLORIDE mmol/L 103   CO2 mmol/L 25.2   BUN mg/dL 16   CREATININE mg/dL 0.91   CALCIUM mg/dL 8.9   BILIRUBIN mg/dL 0.2   ALK PHOS U/L 61   ALT (SGPT) U/L 17   AST (SGOT) U/L 16   GLUCOSE mg/dL 185*       Results from last 7 days  Lab Units 04/22/18  0602 04/22/18  0110 04/21/18  2230   TROPONIN T ng/mL 0.041* 0.040* <0.010       Results from last 7 days  Lab Units 04/22/18  0602   WBC 10*3/mm3 8.64   HEMOGLOBIN g/dL 14.2   HEMATOCRIT % 45.1   PLATELETS 10*3/mm3 154           EKG 4/21/2018                  I personally viewed and interpreted the patient's EKG/Telemetry data.  I personally viewed  and interpreted the patient's chest x-ray  Records from [facility] are obtained and reviewed.    Current Facility-Administered Medications:   •  aspirin tablet 325 mg, 325 mg, Oral, Once, Chauncey Emmanuel MD  •  nitroglycerin (NITROSTAT) SL tablet 0.4 mg, 0.4 mg, Sublingual, Q5 Min PRN, Isac Horvath MD  •  pantoprazole (PROTONIX) EC tablet 40 mg, 40 mg, Oral, Q AM, Isac Horvath MD, 40 mg at 04/22/18 0608  •  sodium chloride 0.9 % flush 10 mL, 10 mL, Intravenous, PRN, Chauncey Emmanuel MD    Assessment and Plan:       Active Hospital Problems (** Indicates Principal Problem)    Diagnosis Date Noted   • Non-STEMI (non-ST elevated myocardial infarction) [I21.4] 04/22/2018      Resolved Hospital Problems    Diagnosis Date Noted Date Resolved   No resolved problems to display.     1.  Acute coronary syndrome.  Patient has symptom complex with a high pretest probability for acute coronary syndrome.  Troponin is borderline elevated.  EKG has nonspecific concerning changes in light of her overall current symptoms.  I recommended a cardiac catheterization and she agrees.  I'm going to add Nitropaste  2.  History of hyperlipidemia-on pravastatin and fenofibrate as OP, I will obtain fasting lipids this morning  3.  Tobacco abuse-we will provide smoking cessation information prior to discharge  4.  History of diabetes mellitus-   5.  History of hypertension-on lisinopril as an outpatient, held overnight    Ac Bansal III, MD  04/22/18  9:22 AM

## 2018-04-23 ENCOUNTER — APPOINTMENT (OUTPATIENT)
Dept: CARDIOLOGY | Facility: HOSPITAL | Age: 68
End: 2018-04-23
Attending: INTERNAL MEDICINE

## 2018-04-23 VITALS
OXYGEN SATURATION: 94 % | SYSTOLIC BLOOD PRESSURE: 93 MMHG | BODY MASS INDEX: 28.16 KG/M2 | HEIGHT: 65 IN | RESPIRATION RATE: 16 BRPM | WEIGHT: 169 LBS | HEART RATE: 76 BPM | TEMPERATURE: 98.6 F | DIASTOLIC BLOOD PRESSURE: 53 MMHG

## 2018-04-23 PROBLEM — E78.2 MIXED HYPERLIPIDEMIA: Chronic | Status: ACTIVE | Noted: 2018-04-23

## 2018-04-23 PROBLEM — E11.59 TYPE 2 DIABETES MELLITUS WITH CIRCULATORY DISORDER: Chronic | Status: ACTIVE | Noted: 2018-04-23

## 2018-04-23 PROBLEM — I25.5 ISCHEMIC CARDIOMYOPATHY: Status: ACTIVE | Noted: 2018-04-23

## 2018-04-23 PROBLEM — Z72.0 TOBACCO ABUSE: Chronic | Status: ACTIVE | Noted: 2018-04-23

## 2018-04-23 LAB
ALBUMIN SERPL-MCNC: 3.5 G/DL (ref 3.5–5.2)
ALBUMIN/GLOB SERPL: 1 G/DL
ALP SERPL-CCNC: 57 U/L (ref 39–117)
ALT SERPL W P-5'-P-CCNC: 16 U/L (ref 1–33)
ANION GAP SERPL CALCULATED.3IONS-SCNC: 14.6 MMOL/L
AORTIC DIMENSIONLESS INDEX: 0.6 (DI)
ARTICHOKE IGE QN: 79 MG/DL (ref 0–100)
AST SERPL-CCNC: 39 U/L (ref 1–32)
BASOPHILS # BLD AUTO: 0.02 10*3/MM3 (ref 0–0.2)
BASOPHILS NFR BLD AUTO: 0.2 % (ref 0–1.5)
BH CV ECHO MEAS - ACS: 2 CM
BH CV ECHO MEAS - AO MAX PG (FULL): 7.3 MMHG
BH CV ECHO MEAS - AO MAX PG: 12.4 MMHG
BH CV ECHO MEAS - AO MEAN PG (FULL): 3 MMHG
BH CV ECHO MEAS - AO MEAN PG: 6 MMHG
BH CV ECHO MEAS - AO ROOT AREA (BSA CORRECTED): 1.7
BH CV ECHO MEAS - AO ROOT AREA: 7.5 CM^2
BH CV ECHO MEAS - AO ROOT DIAM: 3.1 CM
BH CV ECHO MEAS - AO V2 MAX: 176 CM/SEC
BH CV ECHO MEAS - AO V2 MEAN: 118 CM/SEC
BH CV ECHO MEAS - AO V2 VTI: 36.8 CM
BH CV ECHO MEAS - AVA(I,A): 1.9 CM^2
BH CV ECHO MEAS - AVA(I,D): 1.9 CM^2
BH CV ECHO MEAS - AVA(V,A): 2 CM^2
BH CV ECHO MEAS - AVA(V,D): 2 CM^2
BH CV ECHO MEAS - BSA(HAYCOCK): 1.9 M^2
BH CV ECHO MEAS - BSA: 1.8 M^2
BH CV ECHO MEAS - BZI_BMI: 28.1 KILOGRAMS/M^2
BH CV ECHO MEAS - BZI_METRIC_HEIGHT: 165.1 CM
BH CV ECHO MEAS - BZI_METRIC_WEIGHT: 76.7 KG
BH CV ECHO MEAS - CONTRAST EF (2CH): 57.6 ML/M^2
BH CV ECHO MEAS - CONTRAST EF 4CH: 53.7 ML/M^2
BH CV ECHO MEAS - EDV(CUBED): 59.3 ML
BH CV ECHO MEAS - EDV(MOD-SP2): 33 ML
BH CV ECHO MEAS - EDV(MOD-SP4): 54 ML
BH CV ECHO MEAS - EDV(TEICH): 65.9 ML
BH CV ECHO MEAS - EF(CUBED): 66.8 %
BH CV ECHO MEAS - EF(MOD-SP2): 57.6 %
BH CV ECHO MEAS - EF(MOD-SP4): 53.7 %
BH CV ECHO MEAS - EF(TEICH): 59 %
BH CV ECHO MEAS - ESV(CUBED): 19.7 ML
BH CV ECHO MEAS - ESV(MOD-SP2): 14 ML
BH CV ECHO MEAS - ESV(MOD-SP4): 25 ML
BH CV ECHO MEAS - ESV(TEICH): 27 ML
BH CV ECHO MEAS - FS: 30.8 %
BH CV ECHO MEAS - IVS/LVPW: 1
BH CV ECHO MEAS - IVSD: 1.3 CM
BH CV ECHO MEAS - LAT PEAK E' VEL: 8 CM/SEC
BH CV ECHO MEAS - LV DIASTOLIC VOL/BSA (35-75): 29.3 ML/M^2
BH CV ECHO MEAS - LV MASS(C)D: 179.7 GRAMS
BH CV ECHO MEAS - LV MASS(C)DI: 97.6 GRAMS/M^2
BH CV ECHO MEAS - LV MAX PG: 5.1 MMHG
BH CV ECHO MEAS - LV MEAN PG: 3 MMHG
BH CV ECHO MEAS - LV SYSTOLIC VOL/BSA (12-30): 13.6 ML/M^2
BH CV ECHO MEAS - LV V1 MAX: 113 CM/SEC
BH CV ECHO MEAS - LV V1 MEAN: 75.4 CM/SEC
BH CV ECHO MEAS - LV V1 VTI: 22.5 CM
BH CV ECHO MEAS - LVIDD: 3.9 CM
BH CV ECHO MEAS - LVIDS: 2.7 CM
BH CV ECHO MEAS - LVLD AP2: 5.4 CM
BH CV ECHO MEAS - LVLD AP4: 6.9 CM
BH CV ECHO MEAS - LVLS AP2: 4.6 CM
BH CV ECHO MEAS - LVLS AP4: 5.9 CM
BH CV ECHO MEAS - LVOT AREA (M): 3.1 CM^2
BH CV ECHO MEAS - LVOT AREA: 3.1 CM^2
BH CV ECHO MEAS - LVOT DIAM: 2 CM
BH CV ECHO MEAS - LVPWD: 1.3 CM
BH CV ECHO MEAS - MED PEAK E' VEL: 6 CM/SEC
BH CV ECHO MEAS - MV A DUR: 0.11 SEC
BH CV ECHO MEAS - MV A MAX VEL: 114 CM/SEC
BH CV ECHO MEAS - MV DEC SLOPE: 374 CM/SEC^2
BH CV ECHO MEAS - MV DEC TIME: 0.24 SEC
BH CV ECHO MEAS - MV E MAX VEL: 68.9 CM/SEC
BH CV ECHO MEAS - MV E/A: 0.6
BH CV ECHO MEAS - MV MAX PG: 5.7 MMHG
BH CV ECHO MEAS - MV MEAN PG: 2 MMHG
BH CV ECHO MEAS - MV P1/2T MAX VEL: 90.9 CM/SEC
BH CV ECHO MEAS - MV P1/2T: 71.2 MSEC
BH CV ECHO MEAS - MV V2 MAX: 119 CM/SEC
BH CV ECHO MEAS - MV V2 MEAN: 65.2 CM/SEC
BH CV ECHO MEAS - MV V2 VTI: 24.7 CM
BH CV ECHO MEAS - MVA P1/2T LCG: 2.4 CM^2
BH CV ECHO MEAS - MVA(P1/2T): 3.1 CM^2
BH CV ECHO MEAS - MVA(VTI): 2.9 CM^2
BH CV ECHO MEAS - PA ACC TIME: 0.1 SEC
BH CV ECHO MEAS - PA MAX PG (FULL): 2.5 MMHG
BH CV ECHO MEAS - PA MAX PG: 4.6 MMHG
BH CV ECHO MEAS - PA PR(ACCEL): 33.1 MMHG
BH CV ECHO MEAS - PA V2 MAX: 107 CM/SEC
BH CV ECHO MEAS - PULM A REVS DUR: 0.11 SEC
BH CV ECHO MEAS - PULM A REVS VEL: 50 CM/SEC
BH CV ECHO MEAS - PULM DIAS VEL: 34.9 CM/SEC
BH CV ECHO MEAS - PULM S/D: 2.6
BH CV ECHO MEAS - PULM SYS VEL: 91.2 CM/SEC
BH CV ECHO MEAS - PVA(V,A): 1.5 CM^2
BH CV ECHO MEAS - PVA(V,D): 1.5 CM^2
BH CV ECHO MEAS - QP/QS: 0.48
BH CV ECHO MEAS - RV MAX PG: 2.1 MMHG
BH CV ECHO MEAS - RV MEAN PG: 1 MMHG
BH CV ECHO MEAS - RV V1 MAX: 72.4 CM/SEC
BH CV ECHO MEAS - RV V1 MEAN: 55.5 CM/SEC
BH CV ECHO MEAS - RV V1 VTI: 15 CM
BH CV ECHO MEAS - RVOT AREA: 2.3 CM^2
BH CV ECHO MEAS - RVOT DIAM: 1.7 CM
BH CV ECHO MEAS - SI(AO): 150.8 ML/M^2
BH CV ECHO MEAS - SI(CUBED): 21.5 ML/M^2
BH CV ECHO MEAS - SI(LVOT): 38.4 ML/M^2
BH CV ECHO MEAS - SI(MOD-SP2): 10.3 ML/M^2
BH CV ECHO MEAS - SI(MOD-SP4): 15.7 ML/M^2
BH CV ECHO MEAS - SI(TEICH): 21.1 ML/M^2
BH CV ECHO MEAS - SV(AO): 277.8 ML
BH CV ECHO MEAS - SV(CUBED): 39.6 ML
BH CV ECHO MEAS - SV(LVOT): 70.7 ML
BH CV ECHO MEAS - SV(MOD-SP2): 19 ML
BH CV ECHO MEAS - SV(MOD-SP4): 29 ML
BH CV ECHO MEAS - SV(RVOT): 34 ML
BH CV ECHO MEAS - SV(TEICH): 38.9 ML
BH CV ECHO MEAS - TAPSE (>1.6): 2 CM2
BH CV ECHO MEASUREMENTS AVERAGE E/E' RATIO: 9.5
BH CV VAS BP RIGHT ARM: NORMAL MMHG
BH CV XLRA - RV BASE: 2.3 CM
BH CV XLRA - TDI S': 9 CM/SEC
BILIRUB SERPL-MCNC: 0.6 MG/DL (ref 0.1–1.2)
BUN BLD-MCNC: 9 MG/DL (ref 8–23)
BUN/CREAT SERPL: 12.5 (ref 7–25)
CALCIUM SPEC-SCNC: 8.8 MG/DL (ref 8.6–10.5)
CHLORIDE SERPL-SCNC: 100 MMOL/L (ref 98–107)
CHOLEST SERPL-MCNC: 190 MG/DL (ref 0–200)
CO2 SERPL-SCNC: 21.4 MMOL/L (ref 22–29)
CREAT BLD-MCNC: 0.72 MG/DL (ref 0.57–1)
DEPRECATED RDW RBC AUTO: 48.4 FL (ref 37–54)
EOSINOPHIL # BLD AUTO: 0.07 10*3/MM3 (ref 0–0.7)
EOSINOPHIL NFR BLD AUTO: 0.6 % (ref 0.3–6.2)
ERYTHROCYTE [DISTWIDTH] IN BLOOD BY AUTOMATED COUNT: 13.5 % (ref 11.7–13)
GFR SERPL CREATININE-BSD FRML MDRD: 80 ML/MIN/1.73
GLOBULIN UR ELPH-MCNC: 3.4 GM/DL
GLUCOSE BLD-MCNC: 153 MG/DL (ref 65–99)
GLUCOSE BLDC GLUCOMTR-MCNC: 140 MG/DL (ref 70–130)
GLUCOSE BLDC GLUCOMTR-MCNC: 182 MG/DL (ref 70–130)
HBA1C MFR BLD: 6.6 % (ref 4.8–5.6)
HCT VFR BLD AUTO: 45.8 % (ref 35.6–45.5)
HDLC SERPL-MCNC: 27 MG/DL (ref 40–60)
HGB BLD-MCNC: 14.5 G/DL (ref 11.9–15.5)
IMM GRANULOCYTES # BLD: 0.05 10*3/MM3 (ref 0–0.03)
IMM GRANULOCYTES NFR BLD: 0.4 % (ref 0–0.5)
LDLC SERPL CALC-MCNC: ABNORMAL MG/DL (ref 0–100)
LDLC/HDLC SERPL: ABNORMAL {RATIO}
LEFT ATRIUM VOLUME INDEX: 15.9 ML/M2
LV EF 2D ECHO EST: 54 %
LYMPHOCYTES # BLD AUTO: 2.51 10*3/MM3 (ref 0.9–4.8)
LYMPHOCYTES NFR BLD AUTO: 20.5 % (ref 19.6–45.3)
MAXIMAL PREDICTED HEART RATE: 151 BPM
MCH RBC QN AUTO: 31.3 PG (ref 26.9–32)
MCHC RBC AUTO-ENTMCNC: 31.7 G/DL (ref 32.4–36.3)
MCV RBC AUTO: 98.7 FL (ref 80.5–98.2)
MONOCYTES # BLD AUTO: 0.61 10*3/MM3 (ref 0.2–1.2)
MONOCYTES NFR BLD AUTO: 5 % (ref 5–12)
NEUTROPHILS # BLD AUTO: 8.98 10*3/MM3 (ref 1.9–8.1)
NEUTROPHILS NFR BLD AUTO: 73.3 % (ref 42.7–76)
NT-PROBNP SERPL-MCNC: 2637 PG/ML (ref 5–900)
PLATELET # BLD AUTO: 158 10*3/MM3 (ref 140–500)
PMV BLD AUTO: 11.2 FL (ref 6–12)
POTASSIUM BLD-SCNC: 4.2 MMOL/L (ref 3.5–5.2)
PROT SERPL-MCNC: 6.9 G/DL (ref 6–8.5)
RBC # BLD AUTO: 4.64 10*6/MM3 (ref 3.9–5.2)
SODIUM BLD-SCNC: 136 MMOL/L (ref 136–145)
STRESS TARGET HR: 128 BPM
TRIGL SERPL-MCNC: 563 MG/DL (ref 0–150)
TROPONIN T SERPL-MCNC: 0.47 NG/ML (ref 0–0.03)
TSH SERPL DL<=0.05 MIU/L-ACNC: 0.76 MIU/ML (ref 0.27–4.2)
VLDLC SERPL-MCNC: ABNORMAL MG/DL (ref 5–40)
WBC NRBC COR # BLD: 12.24 10*3/MM3 (ref 4.5–10.7)

## 2018-04-23 PROCEDURE — 84443 ASSAY THYROID STIM HORMONE: CPT | Performed by: HOSPITALIST

## 2018-04-23 PROCEDURE — 93005 ELECTROCARDIOGRAM TRACING: CPT | Performed by: INTERNAL MEDICINE

## 2018-04-23 PROCEDURE — 93010 ELECTROCARDIOGRAM REPORT: CPT | Performed by: INTERNAL MEDICINE

## 2018-04-23 PROCEDURE — 99233 SBSQ HOSP IP/OBS HIGH 50: CPT | Performed by: INTERNAL MEDICINE

## 2018-04-23 PROCEDURE — 84484 ASSAY OF TROPONIN QUANT: CPT | Performed by: INTERNAL MEDICINE

## 2018-04-23 PROCEDURE — 80061 LIPID PANEL: CPT | Performed by: INTERNAL MEDICINE

## 2018-04-23 PROCEDURE — 83880 ASSAY OF NATRIURETIC PEPTIDE: CPT | Performed by: HOSPITALIST

## 2018-04-23 PROCEDURE — 83721 ASSAY OF BLOOD LIPOPROTEIN: CPT | Performed by: INTERNAL MEDICINE

## 2018-04-23 PROCEDURE — 93306 TTE W/DOPPLER COMPLETE: CPT | Performed by: INTERNAL MEDICINE

## 2018-04-23 PROCEDURE — 85025 COMPLETE CBC W/AUTO DIFF WBC: CPT | Performed by: HOSPITALIST

## 2018-04-23 PROCEDURE — 80053 COMPREHEN METABOLIC PANEL: CPT | Performed by: HOSPITALIST

## 2018-04-23 PROCEDURE — 82962 GLUCOSE BLOOD TEST: CPT

## 2018-04-23 PROCEDURE — 93306 TTE W/DOPPLER COMPLETE: CPT

## 2018-04-23 RX ORDER — FENOFIBRATE 145 MG/1
145 TABLET, COATED ORAL DAILY
Status: DISCONTINUED | OUTPATIENT
Start: 2018-04-23 | End: 2018-04-23

## 2018-04-23 RX ORDER — PANTOPRAZOLE SODIUM 40 MG/1
40 TABLET, DELAYED RELEASE ORAL DAILY
Qty: 30 TABLET | Refills: 0 | Status: SHIPPED | OUTPATIENT
Start: 2018-04-23 | End: 2018-05-21 | Stop reason: SDUPTHER

## 2018-04-23 RX ORDER — LOSARTAN POTASSIUM 25 MG/1
25 TABLET ORAL
Status: DISCONTINUED | OUTPATIENT
Start: 2018-04-23 | End: 2018-04-23 | Stop reason: HOSPADM

## 2018-04-23 RX ORDER — CARVEDILOL 3.12 MG/1
3.12 TABLET ORAL 2 TIMES DAILY WITH MEALS
Status: DISCONTINUED | OUTPATIENT
Start: 2018-04-23 | End: 2018-04-23 | Stop reason: HOSPADM

## 2018-04-23 RX ORDER — LOSARTAN POTASSIUM 25 MG/1
25 TABLET ORAL
Qty: 30 TABLET | Refills: 0 | Status: SHIPPED | OUTPATIENT
Start: 2018-04-24 | End: 2018-05-07

## 2018-04-23 RX ORDER — ASPIRIN 81 MG/1
81 TABLET, CHEWABLE ORAL DAILY
Qty: 30 TABLET | Refills: 0 | Status: SHIPPED | OUTPATIENT
Start: 2018-04-24 | End: 2018-05-24

## 2018-04-23 RX ORDER — CLOPIDOGREL BISULFATE 75 MG/1
75 TABLET ORAL DAILY
Qty: 30 TABLET | Refills: 0 | Status: SHIPPED | OUTPATIENT
Start: 2018-04-24 | End: 2018-05-21 | Stop reason: SDUPTHER

## 2018-04-23 RX ORDER — CARVEDILOL 3.12 MG/1
3.12 TABLET ORAL 2 TIMES DAILY WITH MEALS
Qty: 60 TABLET | Refills: 0 | Status: SHIPPED | OUTPATIENT
Start: 2018-04-23 | End: 2018-05-21 | Stop reason: SDUPTHER

## 2018-04-23 RX ADMIN — CLOPIDOGREL 75 MG: 75 TABLET, FILM COATED ORAL at 10:01

## 2018-04-23 RX ADMIN — CARVEDILOL 3.12 MG: 3.12 TABLET, FILM COATED ORAL at 10:01

## 2018-04-23 RX ADMIN — LOSARTAN POTASSIUM 25 MG: 25 TABLET ORAL at 10:01

## 2018-04-23 RX ADMIN — ASPIRIN 81 MG: 81 TABLET, CHEWABLE ORAL at 10:01

## 2018-04-23 RX ADMIN — PANTOPRAZOLE SODIUM 40 MG: 40 TABLET, DELAYED RELEASE ORAL at 10:01

## 2018-04-23 NOTE — PROGRESS NOTES
"Daily progress note    Chief complaint   Doing much better  No chest pain  Wants to go home    History of present illness  68-year-old white female with history of diabetes mellitus hyperlipidemia gastroesophageal reflux disease who also smokes presents to [facility] emergency room with left-sided chest pain while she was watching TV associated with shortness of breath nausea vomiting diaphoresis and pain lasted for almost an hour and relived with nitroglycerin in ER.  Patient pain also radiated to left arm.  Patient evaluated in ER found to have indeterminate troponin level with EKG changes admitted for management.  Patient denies any fever cough night sweats or weight loss.  Patient also denies any abdominal pain diarrhea    REVIEW OF SYSTEMS  Review of Systems   Constitutional: Positive for diaphoresis. Negative for fever.   HENT: Negative for sore throat.    Eyes: Negative.    Respiratory: Negative for cough and shortness of breath.    Cardiovascular: Positive for chest pain. Negative for leg swelling.   Gastrointestinal: Positive for nausea. Negative for abdominal pain, diarrhea and vomiting.   Genitourinary: Negative for dysuria.   Musculoskeletal: Negative for myalgias and neck pain.   Skin: Negative for rash.   Allergic/Immunologic: Negative.    Neurological: Negative for weakness, numbness and headaches.   Hematological: Negative.    Psychiatric/Behavioral: Negative.    All other systems reviewed and are negative.     PHYSICAL EXAM  Blood pressure 104/60, pulse 87, temperature 98.6 °F (37 °C), temperature source Oral, resp. rate 16, height 165.1 cm (65\"), weight 76.7 kg (169 lb), SpO2 94 %.    Constitutional: She is oriented to person, place, and time and well-developed, well-nourished, and in no distress. No distress.   Head: Normocephalic and atraumatic.   Eyes: EOM are normal. Pupils are equal, round, and reactive to light.   Neck: Normal range of motion. Neck supple.   Cardiovascular: Normal rate, " regular rhythm and normal heart sounds.  Exam reveals no gallop.    No murmur heard.  Pulmonary/Chest: Effort normal and breath sounds normal. No respiratory distress. She has no wheezes. She has no rhonchi. She has no rales. She exhibits no tenderness.   Abdominal: Soft. There is no tenderness. There is no rebound and no guarding.   Musculoskeletal: Normal range of motion. She exhibits no edema, tenderness or deformity.   Neurological: She is alert and oriented to person, place, and time. She has normal sensation and normal strength.   No focal neuro deficits   Skin: Skin is warm and dry. No rash noted.   Psychiatric: Mood and affect normal.     LAB RESULTS  Lab Results (last 24 hours)     Procedure Component Value Units Date/Time    POC Glucose Once [[id number]]  (Abnormal) Collected:  04/23/18 1043    Specimen:  Blood Updated:  04/23/18 1044     Glucose 182 (H) mg/dL     Narrative:       Meter: [id number] : [id number] Ash SONI    LDL Cholesterol, Direct [[id number]]  (Normal) Collected:  04/23/18 0423    Specimen:  Blood Updated:  04/23/18 0701     LDL Cholesterol  79 mg/dL     Narrative:         LDL Reference Ranges    (U.S. Department of Health and Human Services ATP III Classifications)    Optimal          <100 mg/dl  Near Optimal     100-129 mg/dl  Borderline High  130-159 mg/dl  High             160-189 mg/dl  Very High        >189 mg/dl    Troponin [[id number]]  (Abnormal) Collected:  04/23/18 0423    Specimen:  Blood Updated:  04/23/18 0547     Troponin T 0.471 (C) ng/mL     Narrative:       Troponin T Reference Ranges:  Less than 0.03 ng/mL:    Negative for AMI  0.03 to 0.09 ng/mL:      Indeterminant for AMI  Greater than 0.09 ng/mL: Positive for AMI    POC Glucose Once [[id number]]  (Abnormal) Collected:  04/23/18 0537    Specimen:  Blood Updated:  04/23/18 0539     Glucose 140 (H) mg/dL     Narrative:       Meter: [id number] : [id number] [facility]    Comprehensive Metabolic Panel  [[id number]]  (Abnormal) Collected:  04/23/18 0423    Specimen:  Blood Updated:  04/23/18 0532     Glucose 153 (H) mg/dL      BUN 9 mg/dL      Creatinine 0.72 mg/dL      Sodium 136 mmol/L      Potassium 4.2 mmol/L      Chloride 100 mmol/L      CO2 21.4 (L) mmol/L      Calcium 8.8 mg/dL      Total Protein 6.9 g/dL      Albumin 3.50 g/dL      ALT (SGPT) 16 U/L      AST (SGOT) 39 (H) U/L      Alkaline Phosphatase 57 U/L      Total Bilirubin 0.6 mg/dL      eGFR Non African Amer 80 mL/min/1.73      Globulin 3.4 gm/dL      A/G Ratio 1.0 g/dL      BUN/Creatinine Ratio 12.5     Anion Gap 14.6 mmol/L     TSH [[id number]]  (Normal) Collected:  04/23/18 0423    Specimen:  Blood Updated:  04/23/18 0529     TSH 0.758 mIU/mL     BNP [[id number]]  (Abnormal) Collected:  04/23/18 0423    Specimen:  Blood Updated:  04/23/18 0529     proBNP 2,637.0 (H) pg/mL     Narrative:       Among patients with dyspnea, NT-proBNP is highly sensitive for the detection of acute congestive heart failure. In addition NT-proBNP of <300 pg/ml effectively rules out acute congestive heart failure with 99% negative predictive value.    Lipid Panel [[id number]]  (Abnormal) Collected:  04/23/18 0423    Specimen:  Blood Updated:  04/23/18 0526     Total Cholesterol 190 mg/dL      Triglycerides 563 (H) mg/dL      HDL Cholesterol 27 (L) mg/dL      LDL Cholesterol  -- mg/dL      Comment: Unable to calculate        VLDL Cholesterol -- mg/dL      Comment: Unable to calculate        LDL/HDL Ratio --     Comment: Unable to calculate       Narrative:       Cholesterol Reference Ranges  (U.S. Department of Health and Human Services ATP III Classifications)    Desirable          <200 mg/dL  Borderline High    200-239 mg/dL  High Risk          >240 mg/dL      Triglyceride Reference Ranges  (U.S. Department of Health and Human Services ATP III Classifications)    Normal           <150 mg/dL  Borderline High  150-199 mg/dL  High             200-499 mg/dL  Very High         >500 mg/dL    HDL Reference Ranges  (U.S. Department of Health and Human Services ATP III Classifcations)    Low     <40 mg/dl (major risk factor for CHD)  High    >60 mg/dl ('negative' risk factor for CHD)        LDL Reference Ranges  (U.S. Department of Health and Human Services ATP III Classifcations)    Optimal          <100 mg/dL  Near Optimal     100-129 mg/dL  Borderline High  130-159 mg/dL  High             160-189 mg/dL  Very High        >189 mg/dL    CBC & Differential [[id number]] Collected:  04/23/18 0423    Specimen:  Blood Updated:  04/23/18 0452    Narrative:       The following orders were created for panel order CBC & Differential.  Procedure                               Abnormality         Status                     ---------                               -----------         ------                     CBC Auto Differential[[id number]]        Abnormal            Final result                 Please view results for these tests on the individual orders.    CBC Auto Differential [[id number]]  (Abnormal) Collected:  04/23/18 0423    Specimen:  Blood Updated:  04/23/18 0452     WBC 12.24 (H) 10*3/mm3      RBC 4.64 10*6/mm3      Hemoglobin 14.5 g/dL      Hematocrit 45.8 (H) %      MCV 98.7 (H) fL      MCH 31.3 pg      MCHC 31.7 (L) g/dL      RDW 13.5 (H) %      RDW-SD 48.4 fl      MPV 11.2 fL      Platelets 158 10*3/mm3      Neutrophil % 73.3 %      Lymphocyte % 20.5 %      Monocyte % 5.0 %      Eosinophil % 0.6 %      Basophil % 0.2 %      Immature Grans % 0.4 %      Neutrophils, Absolute 8.98 (H) 10*3/mm3      Lymphocytes, Absolute 2.51 10*3/mm3      Monocytes, Absolute 0.61 10*3/mm3      Eosinophils, Absolute 0.07 10*3/mm3      Basophils, Absolute 0.02 10*3/mm3      Immature Grans, Absolute 0.05 (H) 10*3/mm3     Hemoglobin A1c [[id number]]  (Abnormal) Collected:  04/22/18 0602    Specimen:  Blood Updated:  04/23/18 0228     Hemoglobin A1C 6.60 (H) %     Narrative:       Hemoglobin A1C Ranges:    Increased  Risk for Diabetes  5.7% to 6.4%  Diabetes                     >= 6.5%  Diabetic Goal                < 7.0%    POC Glucose Once [[id number]]  (Abnormal) Collected:  04/22/18 2026    Specimen:  Blood Updated:  04/22/18 2028     Glucose 149 (H) mg/dL     Narrative:       Meter: [id number] : [id number] Aramis CASTILLOA    POC Activated Clotting Time [[id number]]  (Abnormal) Collected:  04/22/18 1155    Specimen:  Blood Updated:  04/22/18 1915     Activated Clotting Time  318 (H) Seconds      Comment: Serial Number: [id number]:  [id number]       POC Activated Clotting Time [[id number]]  (Abnormal) Collected:  04/22/18 1135    Specimen:  Blood Updated:  04/22/18 1915     Activated Clotting Time  164 (H) Seconds      Comment: Serial Number: [id number]:  [id number]       POC Activated Clotting Time [[id number]]  (Abnormal) Collected:  04/22/18 1141    Specimen:  Blood Updated:  04/22/18 1910     Activated Clotting Time  153 (H) Seconds      Comment: Serial Number: [id number]:  [id number]       Troponin [[id number]]  (Abnormal) Collected:  04/22/18 1612    Specimen:  Blood Updated:  04/22/18 1645     Troponin T 0.034 (H) ng/mL     Narrative:       Troponin T Reference Ranges:  Less than 0.03 ng/mL:    Negative for AMI  0.03 to 0.09 ng/mL:      Indeterminant for AMI  Greater than 0.09 ng/mL: Positive for AMI    POC Activated Clotting Time [[id number]]  (Abnormal) Collected:  04/22/18 1554    Specimen:  Blood Updated:  04/22/18 1611     Activated Clotting Time  164 (H) Seconds      Comment: Serial Number: [id number]:  [id number]       POC Activated Clotting Time [[id number]]  (Abnormal) Collected:  04/22/18 1514    Specimen:  Blood Updated:  04/22/18 1610     Activated Clotting Time  180 (H) Seconds      Comment: Serial Number: [id number]:  [id number]           Imaging Results (last 24 hours)     ** No results found for the last 24 hours. **        EKG                              Rhythm/Rate: nsr, 68  P waves and SD:  normal  QRS, axis: normal  ST and T waves: normal       Current Facility-Administered Medications:   •  acetaminophen (TYLENOL) tablet 650 mg, 650 mg, Oral, Q4H PRN, Srikanth Rod MD  •  aspirin chewable tablet 81 mg, 81 mg, Oral, Daily, Srikanth Rod MD, 81 mg at 04/23/18 1001  •  atorvastatin (LIPITOR) tablet 40 mg, 40 mg, Oral, Nightly, Srikanth Rod MD, 40 mg at 04/22/18 2047  •  atropine sulfate injection 0.5-1 mg, 0.5-1 mg, Intravenous, Q5 Min PRN, Srikanth Rod MD  •  carvedilol (COREG) tablet 3.125 mg, 3.125 mg, Oral, BID With Meals, Ac Bansal III, MD, 3.125 mg at 04/23/18 1001  •  clopidogrel (PLAVIX) tablet 600 mg, 600 mg, Oral, Once **AND** clopidogrel (PLAVIX) tablet 75 mg, 75 mg, Oral, Daily, Srikanth Rod MD, 75 mg at 04/23/18 1001  •  HYDROcodone-acetaminophen (NORCO) 5-325 MG per tablet 1 tablet, 1 tablet, Oral, Q4H PRN, Srikanth Rod MD  •  insulin aspart (novoLOG) injection 0-7 Units, 0-7 Units, Subcutaneous, 4x Daily With Meals & Nightly, Isca Horvath MD, 2 Units at 04/22/18 1845  •  losartan (COZAAR) tablet 25 mg, 25 mg, Oral, Q24H, Ac Bansal III, MD, 25 mg at 04/23/18 1001  •  magnesium hydroxide (MILK OF MAGNESIA) suspension 2400 mg/10mL 10 mL, 10 mL, Oral, Daily PRN, Srikanth Rod MD  •  nicotine (NICODERM CQ) 21 MG/24HR patch 1 patch, 1 patch, Transdermal, Q24H, Srikanth Rod MD  •  nitroglycerin (NITROSTAT) SL tablet 0.4 mg, 0.4 mg, Sublingual, Q5 Min PRN, Isac Horvath MD, 0.4 mg at 04/22/18 1249  •  ondansetron (ZOFRAN) tablet 4 mg, 4 mg, Oral, Q6H PRN **OR** ondansetron ODT (ZOFRAN-ODT) disintegrating tablet 4 mg, 4 mg, Oral, Q6H PRN **OR** ondansetron (ZOFRAN) injection 4 mg, 4 mg, Intravenous, Q6H PRN, Srikanth Rod MD, 4 mg at 04/22/18 1321  •  pantoprazole (PROTONIX) EC tablet 40 mg, 40 mg, Oral, Q AM, Isac Horvath MD, 40 mg at 04/23/18 1001  •  sodium chloride 0.9 % flush 10 mL, 10 mL, Intravenous, PRN, Chauncey Emmanuel MD  •  sodium chloride 0.9 % infusion 250  mL, 250 mL, Intravenous, Once PRN, Srikanth Rod MD     ASSESSMENT  Status post non-ST elevation MI  Coronary artery disease status post stent  LV dysfunction with ejection fraction 30%  Diabetes mellitus  Hypertension  Hyperlipidemia  Tobacco abuse  Gastroesophageal reflux disease    PLAN  Check 2-D echo  Discharge home after echo done  Discharge summary dictated    LITA BEAR MD

## 2018-04-23 NOTE — PROGRESS NOTES
"    Patient Name: Tonya Mccall  :1950  68 y.o.      Patient Care Team:  Raymond Simon MD as PCP - General (Internal Medicine)  Raymond Simon MD as Consulting Physician (Internal Medicine)    Chief Complaint: NSTEMI, ischemic CM    Interval History: No chest pain, complains of some right shoulder discomfort.       Objective   Vital Signs  Temp:  [97.8 °F (36.6 °C)-98.4 °F (36.9 °C)] 98.2 °F (36.8 °C)  Heart Rate:  [63-81] 81  Resp:  [16-20] 18  BP: ()/(58-79) 119/72    Intake/Output Summary (Last 24 hours) at 18 0838  Last data filed at 18 0300   Gross per 24 hour   Intake              900 ml   Output             1350 ml   Net             -450 ml     Flowsheet Rows    Flowsheet Row First Filed Value   Admission Height 165.1 cm (65\") Documented at 2018 2220   Admission Weight 76.7 kg (169 lb) Documented at 2018 2220          Physical Exam:   General Appearance:    Alert, cooperative, in no acute distress   Lungs:     Clear to auscultation.  Normal respiratory effort and rate.      Heart:    Regular rhythm and normal rate, normal S1 and S2, no murmurs, gallops or rubs.     Chest Wall:    No abnormalities observed   Abdomen:     Soft, nontender, positive bowel sounds.     Extremities:   no cyanosis, clubbing or edema.  No marked joint deformities.  Adequate musculoskeletal strength.       Results Review:      Results from last 7 days  Lab Units 18  0423   SODIUM mmol/L 136   POTASSIUM mmol/L 4.2   CHLORIDE mmol/L 100   CO2 mmol/L 21.4*   BUN mg/dL 9   CREATININE mg/dL 0.72   GLUCOSE mg/dL 153*   CALCIUM mg/dL 8.8       Results from last 7 days  Lab Units 18  0423 18  1612 18  0602   TROPONIN T ng/mL 0.471* 0.034* 0.041*       Results from last 7 days  Lab Units 18  0423   WBC 10*3/mm3 12.24*   HEMOGLOBIN g/dL 14.5   HEMATOCRIT % 45.8*   PLATELETS 10*3/mm3 158               Results from last 7 days  Lab Units 18  0423   CHOLESTEROL " mg/dL 190   TRIGLYCERIDES mg/dL 563*   HDL CHOL mg/dL 27*   LDL CHOL mg/dL 79               Medication Review:     aspirin 81 mg Oral Daily   atorvastatin 40 mg Oral Nightly   carvedilol 3.125 mg Oral BID With Meals   clopidogrel 600 mg Oral Once   And      clopidogrel 75 mg Oral Daily   insulin aspart 0-7 Units Subcutaneous 4x Daily With Meals & Nightly   losartan 25 mg Oral Q24H   nicotine 1 patch Transdermal Q24H   pantoprazole 40 mg Oral Q AM             Assessment/Plan   Active Hospital Problems (** Indicates Principal Problem)    Diagnosis Date Noted   • **Non-STEMI (non-ST elevated myocardial infarction) [I21.4] 04/22/2018   • Ischemic cardiomyopathy [I25.5] 04/23/2018   • Mixed hyperlipidemia [E78.2] 04/23/2018   • Type 2 diabetes mellitus with circulatory disorder [E11.59] 04/23/2018   • Tobacco abuse [Z72.0] 04/23/2018      Resolved Hospital Problems    Diagnosis Date Noted Date Resolved   No resolved problems to display.     1.  Non-ST segment elevation MI-status post stent placement in the LAD yesterday  2.  Ischemic cardiomyopathy-ejection fraction was 30% time of cardiac catheterization.  EKG shows only borderline ST/T-wave changes with no Q waves and troponin was barely elevated, some hopeful that the ejection fraction is been improved with revascularization.  An echocardiogram is ordered for today, and we have initiated guideline directed medical therapy.  3.  Mixed hyperlipidemia-triglycerides are significantly elevated; would focus initially uncontrolled diabetes, diet, and we'll certainly work on this in cardiac rehabilitation.  We'll then recheck fasting lipids as an outpatient and make any other necessary therapeutic titration.  4.  Diabetes mellitus with circulatory complication  5.  Tobacco abuse-smoking cessation is recommended  6.  Patient should be seen by cardiac rehabilitation this morning  7.  Appropriate to be discharged today from our standpoint.  Patient should be seen in our office  in one week by Maggie TOTH and by myself in 4 weeks.    Ac Bansal III, MD  [facility]  04/23/18  8:38 AM

## 2018-04-23 NOTE — CONSULTS
Met with pt at [facility] on CVI s/p MI & stent placement. Discussed benefits of cardiac rehab and provided Phase II information packet which includes a yellow cover sheet, a [facility]iac [facility] handout, and two [place] [facility] articles that stress the importance of cardiac rehab after a heart event.  She lives closest to [facility] and will likely do her cardiac rehab here, so I provided contact information for our program.  Encouraged to call as soon as possible after discharge to set up her first appointment.  Also encouraged pt to call her insurance company to determine if she has any co-pays or deductibles and to bring her discharge instructions (AVS) from this hospitalization to her first cardiac rehab appointment.  Provided my name & phone number if she has any questions later.

## 2018-04-23 NOTE — PLAN OF CARE
Problem: Fall Risk (Adult)  Goal: Identify Related Risk Factors and Signs and Symptoms  Outcome: Ongoing (interventions implemented as appropriate)   04/23/18 0531   Fall Risk (Adult)   Related Risk Factors (Fall Risk) gait/mobility problems;environment unfamiliar   Signs and Symptoms (Fall Risk) presence of risk factors       Problem: Pain, Acute (Adult)  Goal: Identify Related Risk Factors and Signs and Symptoms   04/23/18 0531   Pain, Acute (Adult)   Related Risk Factors (Acute Pain) knowledge deficit   Signs and Symptoms (Acute Pain) fatigue/weakness

## 2018-04-23 NOTE — DISCHARGE SUMMARY
Discharge summary    Date of admission 4/21/2018  Date of discharge 4/23/2018    Final diagnosis  Status post non-ST elevation MI  Coronary artery disease status post stent  LV dysfunction with ejection fraction 30%  Diabetes mellitus  Hypertension  Hyperlipidemia  Tobacco abuse  Gastroesophageal reflux disease    Discharge medications     Current Facility-Administered Medications:   •  aspirin chewable tablet 81 mg, 81 mg, Oral, Daily, Srikanth Rod MD, 81 mg at 04/23/18 1001  •  atorvastatin (LIPITOR) tablet 40 mg, 40 mg, Oral, Nightly, Srikanth Rod MD, 40 mg at 04/22/18 2047  •  carvedilol (COREG) tablet 3.125 mg, 3.125 mg, Oral, BID With Meals, Ac Bansal III, MD, 3.125 mg at 04/23/18 1001  •  clopidogrel (PLAVIX) tablet 600 mg, 600 mg, Oral, Once **AND** clopidogrel (PLAVIX) tablet 75 mg, 75 mg, Oral, Daily, Srikanth Rod MD, 75 mg at 04/23/18 1001  •  losartan (COZAAR) tablet 25 mg, 25 mg, Oral, Q24H, Ac Bansal III, MD, 25 mg at 04/23/18 1001  •  pantoprazole (PROTONIX) EC tablet 40 mg, 40 mg, Oral, Q AM, Isac Horvath MD, 40 mg at 04/23/18 1001     Consultant obtained   Cardiology     Procedures   Cardiac catheterization with stent placement to [facility]     Hospital course   68-year-old white female with history of diabetes hypertension hyperlipidemia gastro-reflux disease admitted through emergency room with chest pain.  Patient evaluated she has indeterminant troponin level but EKG changes and treatment for management.  Patient admitted started on oxygen nitroglycerin aspirin and serial enzymes and cardiology consult obtained.  Patient enzymes went up consistent with non-ST elevation MI taken to Cath Lab where she found to have lesion in LAD and a stent placed.  Patient ejection fraction of 30% on catheterization.  After stent placement patient is totally pain-free.  Patient will get a 2-D echo today and he'll be discharged home on above medication.  Patient tried steroids) and Lipitor and TriCor.   Patient also advised to quit smoking.      Discharge diet regular     Activity as tolerated     Medication as above     Follow-up with primary doctor in 1 week and follow up with cardiology per their instruction and take medication as directed     LITA BEAR MD

## 2018-05-07 ENCOUNTER — OFFICE VISIT (OUTPATIENT)
Dept: CARDIOLOGY | Facility: CLINIC | Age: 68
End: 2018-05-07

## 2018-05-07 VITALS
WEIGHT: 172 LBS | SYSTOLIC BLOOD PRESSURE: 94 MMHG | HEART RATE: 80 BPM | BODY MASS INDEX: 28.66 KG/M2 | DIASTOLIC BLOOD PRESSURE: 68 MMHG | HEIGHT: 65 IN | OXYGEN SATURATION: 97 %

## 2018-05-07 DIAGNOSIS — Z95.5 HISTORY OF CORONARY ARTERY STENT PLACEMENT: ICD-10-CM

## 2018-05-07 DIAGNOSIS — I25.10 CORONARY ARTERY DISEASE INVOLVING NATIVE CORONARY ARTERY OF NATIVE HEART WITHOUT ANGINA PECTORIS: Primary | ICD-10-CM

## 2018-05-07 DIAGNOSIS — Z72.0 TOBACCO ABUSE: ICD-10-CM

## 2018-05-07 PROCEDURE — 93000 ELECTROCARDIOGRAM COMPLETE: CPT | Performed by: PHYSICIAN ASSISTANT

## 2018-05-07 PROCEDURE — 99214 OFFICE O/P EST MOD 30 MIN: CPT | Performed by: PHYSICIAN ASSISTANT

## 2018-05-07 RX ORDER — ESTRADIOL 0.05 MG/D
1 PATCH TRANSDERMAL WEEKLY
COMMUNITY
End: 2018-12-28

## 2018-05-07 NOTE — PROGRESS NOTES
Date of Office Visit: 2018  Encounter Provider: LISA Garcia  Place of Service: [facility] CARDIOLOGY  Patient Name: Tonya Mccall  :1950    Chief Complaint   Patient presents with   • Coronary Artery Disease     1 week hospital follow up   :     HPI: Tonya Mccall is a 68 y.o. female, new to me, who presents today for follow-up.  Old records have been obtained and reviewed by me.  She is a patient with a past medical history significant for hyperlipidemia, diabetes, and tobacco abuse.  On 2018 she presented to the emergency room with chest pain and ruled in for a non-STEMI.  She underwent cardiac catheterization on 2018.  This showed an EF of around 30%, 20% left main, 90% mid LAD, 20-30% distal LAD, 30% diagonal, 50% circumflex, and a 50% mid RCA lesion.  She underwent successful drug-eluting stent placement to the mid LAD lesion.  An echocardiogram obtained the following day showed normalization of her EF at 54% and no significant valvular abnormalities.   Since she's been home she's been doing fairly well.  She has a little chest discomfort but nothing like the pain she had when she came into the emergency room.  Her main complaint today is diarrhea and lightheadedness.  Her blood pressure at home has been running in the low 100s, and today in the office she is 90/68.  She has had diarrhea every day since she's been home.  She denies any shortness of breath, palpitations, edema, or syncope.  She is no longer smoking.    Past Medical History:   Diagnosis Date   • Atelectasis     right   • Breast nodule    • COPD (chronic obstructive pulmonary disease)    • Diabetes mellitus    • Dyspareunia in female    • Elevated cholesterol    • Herpes genitalis    • Hiatal hernia    • High cholesterol    • History of Papanicolaou smear of cervix     Pap smears hve all been normal since 2004.  They frequently have shown Trichomonas infections to be present.  ,  normal Pap smear, negative HR-HPV.  Next pap 2016.    • Hormone replacement therapy (postmenopausal)    • HPV in female    • Hypertriglyceridemia    • Ovarian cystadenoma    • Pulmonary vascular congestion    • Senile (atrophic) vaginitis    • Soft tissue tumor     NECK   • Trichomonal vaginitis    • Type 2 diabetes mellitus    • Type 2 diabetes mellitus with circulatory disorder 4/23/2018   • Uterine leiomyoma        Past Surgical History:   Procedure Laterality Date   • APPENDECTOMY     • CARDIAC CATHETERIZATION N/A 4/22/2018    Procedure: Left Heart Cath and Cors;  Surgeon: Srikanth Rod MD;  Location:  [facility];  Service: Cardiovascular   • CARDIAC CATHETERIZATION N/A 4/22/2018    Procedure: Peripheral angiography;  Surgeon: Srikanth Rod MD;  Location:  [facility];  Service: Cardiovascular   • CARDIAC CATHETERIZATION N/A 4/22/2018    Procedure: Stent DAO coronary;  Surgeon: Srikanth Rod MD;  Location:  [facility];  Service: Cardiovascular   • DILATATION AND CURETTAGE     • ESSURE TUBAL LIGATION     • TONSILLECTOMY     • TOTAL ABDOMINAL HYSTERECTOMY Bilateral 2006    ASHLEE, BSO. Pathology showed serous cystadenoma/cystadenofibroma , bilaterally of both tubes with focal paratubal cyst. Severe Acute and Chronic Cervicitis and endocervicitis with reactive chage and focal features suggestive of HPV cystopathic effect. Disordered proliferative phase endometrium. Adenomyosis and Leiomyomas.        Social History     Social History   • Marital status: Significant Other     Spouse name: GRANT MARIN   • Number of children: 0   • Years of education: N/A     Occupational History   • Not on file.     Social History Main Topics   • Smoking status: Current Every Day Smoker     Packs/day: 1.00     Types: Cigarettes   • Smokeless tobacco: Never Used   • Alcohol use No   • Drug use: No   • Sexual activity: Yes     Partners: Male     Other Topics Concern   • Not on file      Social History Narrative   • No narrative on file       Family History   Problem Relation Age of Onset   • Stroke Mother      Cerebral Infarction    • Diabetes type II Mother      Mellitus    • Melanoma Father      Malignant    • Chronic granulomatous disease Brother      Wegener's Granolomatosis    • Diabetes type I Brother    • Breast cancer Maternal Aunt 60   • No Known Problems Maternal Grandmother    • No Known Problems Paternal Grandmother    • No Known Problems Paternal Aunt    • Cancer Maternal Grandfather    • No Known Problems Paternal Grandfather    • BRCA 1/2 Neg Hx    • Colon cancer Neg Hx    • Endometrial cancer Neg Hx    • Ovarian cancer Neg Hx        Review of Systems   Constitution: Negative for chills, fever and malaise/fatigue.   Cardiovascular: Negative for chest pain, dyspnea on exertion, leg swelling, near-syncope, orthopnea, palpitations, paroxysmal nocturnal dyspnea and syncope.   Respiratory: Negative for cough and shortness of breath.    Musculoskeletal: Negative for joint pain, joint swelling and myalgias.   Gastrointestinal: Positive for diarrhea. Negative for abdominal pain, melena, nausea and vomiting.   Genitourinary: Negative for frequency and hematuria.   Neurological: Positive for light-headedness. Negative for numbness, paresthesias and seizures.   Allergic/Immunologic: Negative.    All other systems reviewed and are negative.      Allergies   Allergen Reactions   • Clindamycin/Lincomycin Swelling   • Flagyl [Metronidazole] Swelling     Swelling in face         Current Outpatient Prescriptions:   •  aspirin 81 MG chewable tablet, Chew 1 tablet Daily for 30 days., Disp: 30 tablet, Rfl: 0  •  carvedilol (COREG) 3.125 MG tablet, Take 1 tablet by mouth 2 (Two) Times a Day With Meals for 30 days., Disp: 60 tablet, Rfl: 0  •  clopidogrel (PLAVIX) 75 MG tablet, Take 1 tablet by mouth Daily for 30 days., Disp: 30 tablet, Rfl: 0  •  estradiol (CLIMARA) 0.05 MG/24HR patch, Place 1 patch  "on the skin 1 (One) Time Per Week., Disp: , Rfl:   •  metFORMIN (GLUCOPHAGE) 500 MG tablet, Take 500 mg by mouth., Disp: , Rfl:   •  pantoprazole (PROTONIX) 40 MG EC tablet, Take 1 tablet by mouth Daily for 30 days., Disp: 30 tablet, Rfl: 0  •  pravastatin (PRAVACHOL) 10 MG tablet, Take 40 mg by mouth., Disp: , Rfl:       Objective:     Vitals:    05/07/18 1332 05/07/18 1345   BP: 90/68 94/68   BP Location: Right arm Left arm   Pulse: 80    SpO2: 97%    Weight: 78 kg (172 lb)    Height: 165.1 cm (65\")      Body mass index is 28.62 kg/m².    PHYSICAL EXAM:    Physical Exam   Constitutional: She is oriented to person, place, and time. She appears well-developed and well-nourished. No distress.   HENT:   Head: Normocephalic and atraumatic.   Eyes: Pupils are equal, round, and reactive to light.   Neck: No JVD present. No thyromegaly present.   Cardiovascular: Normal rate, regular rhythm, normal heart sounds and intact distal pulses.    No murmur heard.  Right radial and right femoral catheter site well healed without erythema or edema.  Proximal and distal pulses palpable.  No bruits auscultated.   Pulmonary/Chest: Effort normal and breath sounds normal. No respiratory distress.   Abdominal: Soft. Bowel sounds are normal. She exhibits no distension. There is no splenomegaly or hepatomegaly. There is no tenderness.   Musculoskeletal: Normal range of motion. She exhibits no edema.   Neurological: She is alert and oriented to person, place, and time.   Skin: Skin is warm and dry. She is not diaphoretic. No erythema.   Psychiatric: She has a normal mood and affect. Her behavior is normal. Judgment normal.         ECG 12 Lead  Date/Time: 5/7/2018 1:52 PM  Performed by: ISHAAN COLEMAN.  Authorized by: ISHAAN COLEMAN.   Comparison: compared with previous ECG from 4/23/2018  Similar to previous ECG  Rhythm: sinus rhythm  BPM: 80  T depression: V1, V2, aVL and I  T flattening: V3, V4 and V5  Clinical impression: abnormal " ECG  Comments: Indication: Coronary artery disease, status post stent placement.              Assessment:       Diagnosis Plan   1. Coronary artery disease involving native coronary artery of native heart without angina pectoris  ECG 12 Lead    Ambulatory Referral to Cardiac Rehab   2. History of coronary artery stent placement  ECG 12 Lead    Ambulatory Referral to Cardiac Rehab   3. Tobacco abuse       Orders Placed This Encounter   Procedures   • Ambulatory Referral to Cardiac Rehab     Referral Priority:   Routine     Referral Type:   Rehabilitation - Outpatient     Number of Visits Requested:   1   • ECG 12 Lead     This order was created via procedure documentation          Plan:       1.  Coronary Artery Disease  Assessment  • The patient has no angina  • There is a new diagnosis of stable angina in the past 12 months  • The patient is having symptoms consistent with unstable angina     Plan  • Lifestyle modifications discussed include adhering to a heart healthy diet, avoidance of tobacco products and regular monitoring of cholesterol and blood pressure    Subjective - Objective  • There is a history of past MI  • There has been a previous stent procedure using DAO  • Current antiplatelet therapy includes aspirin 81 mg and clopidogrel 75 mg  • The patient qualifies for cardiac rehabilitation, and has been referred to cardiac rehab  • Overall she is doing fairly well.  She is a little lightheaded today, and I think it could be secondary to hypotension.  I'm going to discontinue her losartan.  Her EF on LV gram at the time of her catheterization was 30%, however a follow-up echocardiogram showed normal EF at 54% so I do not think that she really needs to be on the losartan from a heart failure standpoint.  I'm going to get her signed up for cardiac rehabilitation at the hospital.  We did discuss the fact that she never needs to smoke again.  Fortunately she's been smoke free since her heart attack.    2.   Tobacco abuse.  She is no longer smoking.  I encouraged her to keep the good work.    She will follow-up with Dr. Bansal on 6/8/2018 or sooner if needed.  I've asked her to call my office at the end of the week if she is still lightheaded and if her diarrhea has not resolved.  If she still having diarrhea, I would recommend discontinuing the Pravachol to see if that helps.    As always, it has been a pleasure to participate in your patient's care.      Sincerely,         Mya Brown PA-C

## 2018-05-09 ENCOUNTER — TELEPHONE (OUTPATIENT)
Dept: CARDIAC REHAB | Facility: HOSPITAL | Age: 68
End: 2018-05-09

## 2018-05-09 NOTE — TELEPHONE ENCOUNTER
Called pt to set up outpatient cardiac rehab.  Pt wants to check her coverage for program with insurance before she takes an appt.  She will give us a call back in a couple of days.

## 2018-05-21 RX ORDER — CLOPIDOGREL BISULFATE 75 MG/1
75 TABLET ORAL DAILY
Qty: 90 TABLET | Refills: 0 | Status: SHIPPED | OUTPATIENT
Start: 2018-05-21 | End: 2018-08-22 | Stop reason: SDUPTHER

## 2018-05-21 RX ORDER — PANTOPRAZOLE SODIUM 40 MG/1
40 TABLET, DELAYED RELEASE ORAL DAILY
Qty: 90 TABLET | Refills: 0 | Status: SHIPPED | OUTPATIENT
Start: 2018-05-21 | End: 2018-08-22 | Stop reason: SDUPTHER

## 2018-05-21 RX ORDER — CARVEDILOL 3.12 MG/1
3.12 TABLET ORAL 2 TIMES DAILY WITH MEALS
Qty: 180 TABLET | Refills: 0 | Status: SHIPPED | OUTPATIENT
Start: 2018-05-21 | End: 2018-09-08 | Stop reason: SDUPTHER

## 2018-06-08 ENCOUNTER — OFFICE VISIT (OUTPATIENT)
Dept: CARDIOLOGY | Facility: CLINIC | Age: 68
End: 2018-06-08

## 2018-06-08 VITALS
HEIGHT: 65 IN | BODY MASS INDEX: 29.49 KG/M2 | SYSTOLIC BLOOD PRESSURE: 114 MMHG | DIASTOLIC BLOOD PRESSURE: 70 MMHG | WEIGHT: 177 LBS

## 2018-06-08 DIAGNOSIS — Z95.5 HISTORY OF CORONARY ARTERY STENT PLACEMENT: Chronic | ICD-10-CM

## 2018-06-08 DIAGNOSIS — I25.10 CORONARY ARTERY DISEASE INVOLVING NATIVE CORONARY ARTERY OF NATIVE HEART WITHOUT ANGINA PECTORIS: Primary | Chronic | ICD-10-CM

## 2018-06-08 DIAGNOSIS — I21.4 NON-STEMI (NON-ST ELEVATED MYOCARDIAL INFARCTION) (HCC): ICD-10-CM

## 2018-06-08 DIAGNOSIS — E11.59 TYPE 2 DIABETES MELLITUS WITH OTHER CIRCULATORY COMPLICATION, WITHOUT LONG-TERM CURRENT USE OF INSULIN (HCC): Chronic | ICD-10-CM

## 2018-06-08 DIAGNOSIS — I25.5 ISCHEMIC CARDIOMYOPATHY: Chronic | ICD-10-CM

## 2018-06-08 DIAGNOSIS — E78.2 MIXED HYPERLIPIDEMIA: Chronic | ICD-10-CM

## 2018-06-08 PROCEDURE — 99214 OFFICE O/P EST MOD 30 MIN: CPT | Performed by: INTERNAL MEDICINE

## 2018-06-08 NOTE — PROGRESS NOTES
Subjective:     Encounter Date:06/08/2018      Patient ID: Tonya Mccall is a 68 y.o. female.    Chief Complaint: CAD  History of Present Illness    As patient comes in the office today for follow-up of her cardiac status.  It's been about 3 weeks and she was last seen.    She states that she is doing well and she's feeling much better.  She still is staying off the cigarettes; she stopped April 21.  No chest pain or chest discomfort.  No shortness of breath.  She's not yet gotten started in cardiac rehabilitation.  She has not had any palpitations or heart racing, and no presyncope or syncope.     On 4/21/2018 she presented to the emergency room with chest pain and ruled in for a non-STEMI.  She underwent cardiac catheterization on 4/22/2018.  This showed an EF of around 30%, 20% left main, 90% mid LAD, 20-30% distal LAD, 30% diagonal, 50% circumflex, and a 50% mid RCA lesion.  She underwent successful drug-eluting stent placement to the mid LAD lesion.  An echocardiogram obtained the following day showed normalization of her EF at 54% and no significant valvular abnormalities.    The following portions of the patient's history were reviewed and updated as appropriate: allergies, current medications, past family history, past medical history, past social history, past surgical history and problem list.    Past Medical History:   Diagnosis Date   • Atelectasis     right   • Breast nodule    • CAD (coronary artery disease)    • COPD (chronic obstructive pulmonary disease)    • Dyspareunia in female    • Elevated cholesterol    • GERD (gastroesophageal reflux disease)    • Herpes genitalis    • Hiatal hernia    • High cholesterol    • History of coronary artery stent placement    • History of Papanicolaou smear of cervix 2013    Pap smears hve all been normal since 2004.  They frequently have shown Trichomonas infections to be present.  2013, normal Pap smear, negative HR-HPV.  Next pap 2016.    • Hormone  Patient is resting comfortably.  BiPAP remains unchanged    replacement therapy (postmenopausal)    • HPV in female    • Hyperlipidemia    • Hypertension    • Hypertriglyceridemia    • LV dysfunction     with ejection fraction 30%   • Ovarian cystadenoma    • Pulmonary vascular congestion    • Senile (atrophic) vaginitis    • Soft tissue tumor     NECK   • Tobacco abuse    • Trichomonal vaginitis    • Type 2 diabetes mellitus with circulatory disorder 4/23/2018   • Uterine leiomyoma        Past Surgical History:   Procedure Laterality Date   • APPENDECTOMY     • CARDIAC CATHETERIZATION N/A 4/22/2018    Procedure: Left Heart Cath and Cors;  Surgeon: Srikanth Rod MD;  Location:  [facility];  Service: Cardiovascular   • CARDIAC CATHETERIZATION N/A 4/22/2018    Procedure: Peripheral angiography;  Surgeon: Srikanth Rod MD;  Location:  [facility];  Service: Cardiovascular   • CARDIAC CATHETERIZATION N/A 4/22/2018    Procedure: Stent DAO coronary;  Surgeon: Srikanth Rod MD;  Location:  [facility];  Service: Cardiovascular   • DILATATION AND CURETTAGE     • ESSURE TUBAL LIGATION     • TONSILLECTOMY     • TOTAL ABDOMINAL HYSTERECTOMY Bilateral 2006    ASHLEE, BSO. Pathology showed serous cystadenoma/cystadenofibroma , bilaterally of both tubes with focal paratubal cyst. Severe Acute and Chronic Cervicitis and endocervicitis with reactive chage and focal features suggestive of HPV cystopathic effect. Disordered proliferative phase endometrium. Adenomyosis and Leiomyomas.        Social History     Social History   • Marital status: Significant Other     Spouse name: GRANT MARIN   • Number of children: 0   • Years of education: N/A     Occupational History   • Not on file.     Social History Main Topics   • Smoking status: Former Smoker     Packs/day: 1.00     Types: Cigarettes     Quit date: 4/21/2018   • Smokeless tobacco: Never Used   • Alcohol use No   • Drug use: No   • Sexual activity: Yes     Partners: Male     Other  "Topics Concern   • Not on file     Social History Narrative   • No narrative on file       Review of Systems   Constitution: Negative for chills, decreased appetite, fever and night sweats.   HENT: Negative for ear discharge, ear pain, hearing loss, nosebleeds and sore throat.    Eyes: Negative for blurred vision, double vision and pain.   Cardiovascular: Negative for cyanosis.   Respiratory: Negative for hemoptysis and sputum production.    Endocrine: Negative for cold intolerance and heat intolerance.   Hematologic/Lymphatic: Negative for adenopathy.   Skin: Negative for dry skin, itching, nail changes, rash and suspicious lesions.   Musculoskeletal: Negative for arthritis, gout, muscle cramps, muscle weakness, myalgias and neck pain.   Gastrointestinal: Negative for anorexia, bowel incontinence, constipation, diarrhea, dysphagia, hematemesis and jaundice.   Genitourinary: Negative for bladder incontinence, dysuria, flank pain, frequency, hematuria and nocturia.   Neurological: Negative for focal weakness, numbness, paresthesias and seizures.   Psychiatric/Behavioral: Negative for altered mental status, hallucinations, hypervigilance, suicidal ideas and thoughts of violence.   Allergic/Immunologic: Negative for persistent infections.       Procedures       Objective:     Vitals:    06/08/18 1418   BP: 114/70   BP Location: Left arm   Patient Position: Sitting   Weight: 80.3 kg (177 lb)   Height: 165.1 cm (65\")         Physical Exam   Constitutional: She is oriented to person, place, and time. She appears well-developed and well-nourished. No distress.   HENT:   Head: Normocephalic and atraumatic.   Nose: Nose normal.   Mouth/Throat: Oropharynx is clear and moist.   Eyes: Conjunctivae and EOM are normal. Pupils are equal, round, and reactive to light. Right eye exhibits no discharge. Left eye exhibits no discharge.   Neck: Normal range of motion. Neck supple. No tracheal deviation present. No thyromegaly present. "   Cardiovascular: Normal rate, regular rhythm, S1 normal, S2 normal, normal heart sounds and normal pulses.  Exam reveals no S3.    Pulmonary/Chest: Effort normal and breath sounds normal. No stridor. No respiratory distress. She exhibits no tenderness.   Abdominal: Soft. Bowel sounds are normal. She exhibits no distension and no mass. There is no tenderness. There is no rebound and no guarding.   Musculoskeletal: Normal range of motion. She exhibits no tenderness or deformity.   Lymphadenopathy:     She has no cervical adenopathy.   Neurological: She is alert and oriented to person, place, and time. She has normal reflexes.   Skin: Skin is warm and dry. No rash noted. She is not diaphoretic. No erythema.   Psychiatric: She has a normal mood and affect. Thought content normal.       Lab Review:             Performed        Assessment:          Diagnosis Plan   1. Coronary artery disease involving native coronary artery of native heart without angina pectoris     2. History of coronary artery stent placement     3. Ischemic cardiomyopathy     4. Mixed hyperlipidemia     5. Non-STEMI (non-ST elevated myocardial infarction)     6. Type 2 diabetes mellitus with other circulatory complication, without long-term current use of insulin            Plan:       1. Coronary Artery Disease  Assessment  • The patient has no angina    Plan  • Lifestyle modifications discussed include adhering to a heart healthy diet, avoidance of tobacco products, maintenance of a healthy weight, medication compliance, regular exercise and regular monitoring of cholesterol and blood pressure    Subjective - Objective  • There has been a previous stent procedure using DAO  • Current antiplatelet therapy includes aspirin 81 mg and clopidogrel 75 mg  • The patient qualifies for cardiac rehabilitation, and has been referred to cardiac rehab      2.  History of non-ST segment elevation MI  3.  Ischemic myopathy-continue current medical therapy  4.   Diabetes mellitus with circulatory complication  5.  Mixed hyperlipidemia-continue lipid-lowering therapy  Thank you very much for allowing us to participate in the care of this pleasant patient.  Please don't hesitate to call if I can be of assistance in any way.      Current Outpatient Prescriptions:   •  aspirin 81 MG tablet, Take 81 mg by mouth Daily., Disp: , Rfl:   •  carvedilol (COREG) 3.125 MG tablet, Take 1 tablet by mouth 2 (Two) Times a Day With Meals for 30 days., Disp: 180 tablet, Rfl: 0  •  clopidogrel (PLAVIX) 75 MG tablet, Take 1 tablet by mouth Daily for 30 days., Disp: 90 tablet, Rfl: 0  •  estradiol (CLIMARA) 0.05 MG/24HR patch, Place 1 patch on the skin 1 (One) Time Per Week., Disp: , Rfl:   •  metFORMIN (GLUCOPHAGE) 500 MG tablet, Take 500 mg by mouth., Disp: , Rfl:   •  pantoprazole (PROTONIX) 40 MG EC tablet, Take 1 tablet by mouth Daily for 30 days., Disp: 90 tablet, Rfl: 0  •  pravastatin (PRAVACHOL) 10 MG tablet, Take 40 mg by mouth., Disp: , Rfl:

## 2018-08-22 RX ORDER — CLOPIDOGREL BISULFATE 75 MG/1
TABLET ORAL
Qty: 90 TABLET | Refills: 2 | Status: SHIPPED | OUTPATIENT
Start: 2018-08-22 | End: 2019-04-15

## 2018-08-22 RX ORDER — PANTOPRAZOLE SODIUM 40 MG/1
TABLET, DELAYED RELEASE ORAL
Qty: 90 TABLET | Refills: 2 | Status: SHIPPED | OUTPATIENT
Start: 2018-08-22 | End: 2019-04-15

## 2018-09-10 RX ORDER — CARVEDILOL 3.12 MG/1
TABLET ORAL
Qty: 180 TABLET | Refills: 0 | Status: SHIPPED | OUTPATIENT
Start: 2018-09-10 | End: 2018-12-30 | Stop reason: SDUPTHER

## 2018-12-06 ENCOUNTER — TELEPHONE (OUTPATIENT)
Dept: CARDIOLOGY | Facility: CLINIC | Age: 68
End: 2018-12-06

## 2018-12-06 NOTE — TELEPHONE ENCOUNTER
Pt is having left total hip surgery with epidural. The surgeon is . It is not schedule yet. He is pt clear for surgery? PT was last seen on 6/8/18.       Fax #: [phone]  PH #: [phone]    Thanks Cathy

## 2018-12-06 NOTE — TELEPHONE ENCOUNTER
Pt has been schedule to see  on 12/28/18 at 10:00AM for surgery clearance. Pt is aware.    Thanks Cathy

## 2018-12-28 ENCOUNTER — OFFICE VISIT (OUTPATIENT)
Dept: CARDIOLOGY | Facility: CLINIC | Age: 68
End: 2018-12-28

## 2018-12-28 VITALS
SYSTOLIC BLOOD PRESSURE: 120 MMHG | WEIGHT: 178 LBS | HEART RATE: 78 BPM | DIASTOLIC BLOOD PRESSURE: 80 MMHG | BODY MASS INDEX: 29.66 KG/M2 | HEIGHT: 65 IN

## 2018-12-28 DIAGNOSIS — I25.5 ISCHEMIC CARDIOMYOPATHY: Chronic | ICD-10-CM

## 2018-12-28 DIAGNOSIS — E11.59 TYPE 2 DIABETES MELLITUS WITH OTHER CIRCULATORY COMPLICATION, WITHOUT LONG-TERM CURRENT USE OF INSULIN (HCC): Chronic | ICD-10-CM

## 2018-12-28 DIAGNOSIS — Z95.5 HISTORY OF CORONARY ARTERY STENT PLACEMENT: Chronic | ICD-10-CM

## 2018-12-28 DIAGNOSIS — I21.4 NON-STEMI (NON-ST ELEVATED MYOCARDIAL INFARCTION) (HCC): Chronic | ICD-10-CM

## 2018-12-28 DIAGNOSIS — E78.2 MIXED HYPERLIPIDEMIA: Chronic | ICD-10-CM

## 2018-12-28 DIAGNOSIS — Z01.810 PREOP CARDIOVASCULAR EXAM: Primary | ICD-10-CM

## 2018-12-28 DIAGNOSIS — I25.10 CORONARY ARTERY DISEASE INVOLVING NATIVE CORONARY ARTERY OF NATIVE HEART WITHOUT ANGINA PECTORIS: Chronic | ICD-10-CM

## 2018-12-28 PROCEDURE — 99214 OFFICE O/P EST MOD 30 MIN: CPT | Performed by: INTERNAL MEDICINE

## 2018-12-28 PROCEDURE — 93000 ELECTROCARDIOGRAM COMPLETE: CPT | Performed by: INTERNAL MEDICINE

## 2018-12-28 NOTE — PROGRESS NOTES
Subjective:     Encounter Date:06/08/2018      Patient ID: Tonya Mccall is a 68 y.o. female.    Chief Complaint: CAD  History of Present Illness    Dear Dr. Vance,    I had the pleasure of seeing this patient in the office today.  She comes in for assessment of cardiac risk for anticipated total hip surgery.  This is scheduled for January 3.     On 4/21/2018 she presented to the emergency room with chest pain and ruled in for a non-STEMI.  She underwent cardiac catheterization on 4/22/2018.  This showed an EF of around 30%, 20% left main, 90% mid LAD, 20-30% distal LAD, 30% diagonal, 50% circumflex, and a 50% mid RCA lesion.  She underwent successful drug-eluting stent placement to the mid LAD lesion.  An echocardiogram obtained the following day showed normalization of her EF at 54% and no significant valvular abnormalities.  Antiplatelet therapy was recommended for one year.    She comes in today for assessment of cardiac risk for total hip replacement scheduled eventually for January 3.    She's been doing well without any cardiac complaints.This patient denies any chest pain, pressure, tightness, squeezing, or heartburn.  This patient has not experienced any feeling of palpitations, tachycardia or heart racing and no presyncope or syncope.  There has not been any problems with dizziness or lightheadedness.  There has not been any orthopnea or PND, and no problems with lower extremity edema.  This patient denies any shortness of breath at rest or with activity and has not had any wheezing.  This patient has not had any problems with unexplained nausea or vomiting. The patient has continued to perform daily activities of living without any specific problem or change in the level of activity.  This patient has not been recently hospitalized for any reason.      The following portions of the patient's history were reviewed and updated as appropriate: allergies, current medications, past family history, past  medical history, past social history, past surgical history and problem list.    Past Medical History:   Diagnosis Date   • Atelectasis     right   • Breast nodule    • CAD (coronary artery disease)    • COPD (chronic obstructive pulmonary disease) (CMS/[facility])    • Dyspareunia in female    • Elevated cholesterol    • GERD (gastroesophageal reflux disease)    • Herpes genitalis    • Hiatal hernia    • High cholesterol    • History of coronary artery stent placement    • History of Papanicolaou smear of cervix 2013    Pap smears hve all been normal since 2004.  They frequently have shown Trichomonas infections to be present.  2013, normal Pap smear, negative HR-HPV.  Next pap 2016.    • Hormone replacement therapy (postmenopausal)    • HPV in female    • Hyperlipidemia    • Hypertension    • Hypertriglyceridemia    • LV dysfunction     with ejection fraction 30%   • Ovarian cystadenoma    • Pulmonary vascular congestion    • Senile (atrophic) vaginitis    • Soft tissue tumor     NECK   • Tobacco abuse    • Trichomonal vaginitis    • Type 2 diabetes mellitus with circulatory disorder (CMS/[facility]) 4/23/2018   • Uterine leiomyoma        Past Surgical History:   Procedure Laterality Date   • APPENDECTOMY     • CARDIAC CATHETERIZATION N/A 4/22/2018    Procedure: Left Heart Cath and Cors;  Surgeon: Srikanth Rod MD;  Location:  [facility];  Service: Cardiovascular   • CARDIAC CATHETERIZATION N/A 4/22/2018    Procedure: Peripheral angiography;  Surgeon: Srikanth Rod MD;  Location:  [facility];  Service: Cardiovascular   • CARDIAC CATHETERIZATION N/A 4/22/2018    Procedure: Stent DAO coronary;  Surgeon: Srikanth Rod MD;  Location:  [facility];  Service: Cardiovascular   • DILATATION AND CURETTAGE     • ESSURE TUBAL LIGATION     • TONSILLECTOMY     • TOTAL ABDOMINAL HYSTERECTOMY Bilateral 2006    ASHLEE, BSO. Pathology showed serous cystadenoma/cystadenofibroma ,  bilaterally of both tubes with focal paratubal cyst. Severe Acute and Chronic Cervicitis and endocervicitis with reactive chage and focal features suggestive of HPV cystopathic effect. Disordered proliferative phase endometrium. Adenomyosis and Leiomyomas.        Social History     Socioeconomic History   • Marital status: Significant Other     Spouse name: GRANT MARIN   • Number of children: 0   • Years of education: Not on file   • Highest education level: Not on file   Social Needs   • Financial resource strain: Not on file   • Food insecurity - worry: Not on file   • Food insecurity - inability: Not on file   • Transportation needs - medical: Not on file   • Transportation needs - non-medical: Not on file   Occupational History   • Not on file   Tobacco Use   • Smoking status: Former Smoker     Packs/day: 1.00     Types: Cigarettes     Last attempt to quit: 2018     Years since quittin.6   • Smokeless tobacco: Never Used   Substance and Sexual Activity   • Alcohol use: No   • Drug use: No   • Sexual activity: Yes     Partners: Male   Other Topics Concern   • Not on file   Social History Narrative   • Not on file       Review of Systems   Constitution: Negative for chills, decreased appetite, fever and night sweats.   HENT: Negative for ear discharge, ear pain, hearing loss, nosebleeds and sore throat.    Eyes: Negative for blurred vision, double vision and pain.   Cardiovascular: Negative for cyanosis.   Respiratory: Negative for hemoptysis and sputum production.    Endocrine: Negative for cold intolerance and heat intolerance.   Hematologic/Lymphatic: Negative for adenopathy.   Skin: Negative for dry skin, itching, nail changes, rash and suspicious lesions.   Musculoskeletal: Negative for arthritis, gout, muscle cramps, muscle weakness, myalgias and neck pain.   Gastrointestinal: Negative for anorexia, bowel incontinence, constipation, diarrhea, dysphagia, hematemesis and jaundice.   Genitourinary:  "Negative for bladder incontinence, dysuria, flank pain, frequency, hematuria and nocturia.   Neurological: Negative for focal weakness, numbness, paresthesias and seizures.   Psychiatric/Behavioral: Negative for altered mental status, hallucinations, hypervigilance, suicidal ideas and thoughts of violence.   Allergic/Immunologic: Negative for persistent infections.         ECG 12 Lead  Date/Time: 12/28/2018 12:44 PM  Performed by: Ac Bansal III, MD  Authorized by: Ac Bansal III, MD   Comparison: compared with previous ECG   Similar to previous ECG  Rhythm: sinus rhythm  Rate: normal  Conduction: conduction normal  ST Segments: ST segments normal  T Waves: T waves normal  QRS axis: normal  Other findings: PRWP  Clinical impression: normal ECG               Objective:     Vitals:    12/28/18 1000   BP: 120/80   Pulse: 78   Weight: 80.7 kg (178 lb)   Height: 165.1 cm (65\")         Physical Exam   Constitutional: She is oriented to person, place, and time. She appears well-developed and well-nourished. No distress.   HENT:   Head: Normocephalic and atraumatic.   Nose: Nose normal.   Mouth/Throat: Oropharynx is clear and moist.   Eyes: Conjunctivae and EOM are normal. Pupils are equal, round, and reactive to light. Right eye exhibits no discharge. Left eye exhibits no discharge.   Neck: Normal range of motion. Neck supple. No tracheal deviation present. No thyromegaly present.   Cardiovascular: Normal rate, regular rhythm, S1 normal, S2 normal, normal heart sounds and normal pulses. Exam reveals no S3.   Pulmonary/Chest: Effort normal and breath sounds normal. No stridor. No respiratory distress. She exhibits no tenderness.   Abdominal: Soft. Bowel sounds are normal. She exhibits no distension and no mass. There is no tenderness. There is no rebound and no guarding.   Musculoskeletal: Normal range of motion. She exhibits no tenderness or deformity.   Lymphadenopathy:     She has no cervical adenopathy. "   Neurological: She is alert and oriented to person, place, and time. She has normal reflexes.   Skin: Skin is warm and dry. No rash noted. She is not diaphoretic. No erythema.   Psychiatric: She has a normal mood and affect. Thought content normal.       Lab Review:             Performed        Assessment:          Diagnosis Plan   1. Preop cardiovascular exam  ECG 12 Lead   2. Coronary artery disease involving native coronary artery of native heart without angina pectoris     3. Ischemic cardiomyopathy     4. Mixed hyperlipidemia     5. Non-STEMI (non-ST elevated myocardial infarction) (CMS/[facility])     6. Type 2 diabetes mellitus with other circulatory complication, without long-term current use of insulin (CMS/[facility])     7. History of coronary artery stent placement            Plan:       1. Coronary Artery Disease  Assessment  • The patient has no angina    Plan  • Lifestyle modifications discussed include adhering to a heart healthy diet, avoidance of tobacco products, maintenance of a healthy weight, medication compliance, regular exercise and regular monitoring of cholesterol and blood pressure    Subjective - Objective  • There has been a previous stent procedure using DAO  • Current antiplatelet therapy includes aspirin 81 mg and clopidogrel 75 mg  • The patient qualifies for cardiac rehabilitation, and has been referred to cardiac rehab      2.  History of non-ST segment elevation MI  3.  Ischemic myopathy-continue current medical therapy  4.  Diabetes mellitus with circulatory complication  5.  Mixed hyperlipidemia-continue lipid-lowering therapy  6.  Preoperative cardiovascular exam-patient had a drug-eluting stent placed April 21, 2018.  Dual antiplatelet therapy was recommended for one year.  Accordingly, cessation of dual antiplatelet therapy prior to that day would be associated with some increased risk of acute coronary syndrome.  This increased risk will need to be weighed against the need for surgical  intervention in January rather than waiting until April.    Thank you very much for allowing us to participate in the care of this pleasant patient.  Please don't hesitate to call if I can be of assistance in any way.      Current Outpatient Medications:   •  aspirin 81 MG tablet, Take 81 mg by mouth Daily., Disp: , Rfl:   •  carvedilol (COREG) 3.125 MG tablet, TAKE ONE TABLET BY MOUTH TWICE A DAY WITH MEALS, Disp: 180 tablet, Rfl: 0  •  clopidogrel (PLAVIX) 75 MG tablet, TAKE ONE TABLET BY MOUTH DAILY, Disp: 90 tablet, Rfl: 2  •  metFORMIN (GLUCOPHAGE) 500 MG tablet, Take 500 mg by mouth Daily With Breakfast., Disp: , Rfl:   •  pantoprazole (PROTONIX) 40 MG EC tablet, TAKE ONE TABLET BY MOUTH DAILY FOR 30 DAYS, Disp: 90 tablet, Rfl: 2  •  pravastatin (PRAVACHOL) 10 MG tablet, Take 40 mg by mouth Daily., Disp: , Rfl:

## 2018-12-31 RX ORDER — CARVEDILOL 3.12 MG/1
TABLET ORAL
Qty: 180 TABLET | Refills: 0 | Status: SHIPPED | OUTPATIENT
Start: 2018-12-31 | End: 2019-04-25 | Stop reason: SDUPTHER

## 2019-04-15 ENCOUNTER — OFFICE VISIT (OUTPATIENT)
Dept: CARDIOLOGY | Facility: CLINIC | Age: 69
End: 2019-04-15

## 2019-04-15 VITALS
HEART RATE: 81 BPM | BODY MASS INDEX: 30.16 KG/M2 | HEIGHT: 65 IN | SYSTOLIC BLOOD PRESSURE: 120 MMHG | WEIGHT: 181 LBS | DIASTOLIC BLOOD PRESSURE: 82 MMHG

## 2019-04-15 DIAGNOSIS — I25.5 ISCHEMIC CARDIOMYOPATHY: Chronic | ICD-10-CM

## 2019-04-15 DIAGNOSIS — I25.10 CORONARY ARTERY DISEASE INVOLVING NATIVE CORONARY ARTERY OF NATIVE HEART WITHOUT ANGINA PECTORIS: Primary | Chronic | ICD-10-CM

## 2019-04-15 DIAGNOSIS — E78.2 MIXED HYPERLIPIDEMIA: Chronic | ICD-10-CM

## 2019-04-15 DIAGNOSIS — I21.4 NON-STEMI (NON-ST ELEVATED MYOCARDIAL INFARCTION) (HCC): Chronic | ICD-10-CM

## 2019-04-15 DIAGNOSIS — Z95.5 HISTORY OF CORONARY ARTERY STENT PLACEMENT: Chronic | ICD-10-CM

## 2019-04-15 DIAGNOSIS — Z01.810 PREOP CARDIOVASCULAR EXAM: ICD-10-CM

## 2019-04-15 DIAGNOSIS — E11.59 TYPE 2 DIABETES MELLITUS WITH OTHER CIRCULATORY COMPLICATION, WITHOUT LONG-TERM CURRENT USE OF INSULIN (HCC): Chronic | ICD-10-CM

## 2019-04-15 PROCEDURE — 93000 ELECTROCARDIOGRAM COMPLETE: CPT | Performed by: INTERNAL MEDICINE

## 2019-04-15 PROCEDURE — 99214 OFFICE O/P EST MOD 30 MIN: CPT | Performed by: INTERNAL MEDICINE

## 2019-04-15 NOTE — PROGRESS NOTES
Subjective:     Encounter Date: 4/15/2019      Patient ID: Tonya Mccall is a 69 y.o. female.    Chief Complaint: CAD  History of Present Illness    Dear Dr. Vance,    I had the pleasure of seeing this patient in the office today.  She comes in for assessment of cardiac risk for anticipated total hip surgery.  She also is scheduled to have cataract surgery at [facility].    She states that since her last visit with her in December she was doing well without any complaints.  She denies any chest pain, pressure, tightness, squeezing, or heartburn.  She has not experienced any feeling of palpitations, tachycardia or heart racing and no presyncope or syncope.  There have not been any problems with dizziness or lightheadedness.  There have not been any orthopnea or PND, and no problems with lower extremity edema.  She denies any shortness of breath at rest or with activity and has not had any wheezing.  She has not had any problems with unexplained nausea or vomiting. She has continued to perform daily activities of living without any specific problem or change in the level of activity.  She has not been recently hospitalized for any reason.     On 4/21/2018 she presented to the emergency room with chest pain and ruled in for a non-STEMI.  She underwent cardiac catheterization on 4/22/2018.  This showed an EF of around 30%, 20% left main, 90% mid LAD, 20-30% distal LAD, 30% diagonal, 50% circumflex, and a 50% mid RCA lesion.  She underwent successful drug-eluting stent placement to the mid LAD lesion.  An echocardiogram obtained the following day showed normalization of her EF at 54% and no significant valvular abnormalities.  Antiplatelet therapy was recommended for one year.        The following portions of the patient's history were reviewed and updated as appropriate: allergies, current medications, past family history, past medical history, past social history, past surgical history and problem  list.    Past Medical History:   Diagnosis Date   • Atelectasis     right   • Breast nodule    • CAD (coronary artery disease)    • COPD (chronic obstructive pulmonary disease) (CMS/[facility])    • Dyspareunia in female    • Elevated cholesterol    • GERD (gastroesophageal reflux disease)    • Herpes genitalis    • Hiatal hernia    • High cholesterol    • History of coronary artery stent placement    • History of Papanicolaou smear of cervix 2013    Pap smears hve all been normal since 2004.  They frequently have shown Trichomonas infections to be present.  2013, normal Pap smear, negative HR-HPV.  Next pap 2016.    • Hormone replacement therapy (postmenopausal)    • HPV in female    • Hyperlipidemia    • Hypertension    • Hypertriglyceridemia    • Ischemic cardiomyopathy    • LV dysfunction     with ejection fraction 30%   • Non-STEMI (non-ST elevated myocardial infarction) (CMS/[facility])    • Ovarian cystadenoma    • Pulmonary vascular congestion    • Senile (atrophic) vaginitis    • Soft tissue tumor     NECK   • Tobacco abuse    • Trichomonal vaginitis    • Type 2 diabetes mellitus with circulatory disorder (CMS/[facility]) 4/23/2018   • Uterine leiomyoma        Past Surgical History:   Procedure Laterality Date   • APPENDECTOMY     • CARDIAC CATHETERIZATION N/A 4/22/2018    Procedure: Left Heart Cath and Cors;  Surgeon: Srikanth Rod MD;  Location:  [facility];  Service: Cardiovascular   • CARDIAC CATHETERIZATION N/A 4/22/2018    Procedure: Peripheral angiography;  Surgeon: Srikanth Rod MD;  Location:  [facility];  Service: Cardiovascular   • CARDIAC CATHETERIZATION N/A 4/22/2018    Procedure: Stent DAO coronary;  Surgeon: Srikanth Rod MD;  Location:  [facility];  Service: Cardiovascular   • DILATATION AND CURETTAGE     • ESSURE TUBAL LIGATION     • TONSILLECTOMY     • TOTAL ABDOMINAL HYSTERECTOMY Bilateral 2006    ASHLEE, BSO. Pathology showed serous  cystadenoma/cystadenofibroma , bilaterally of both tubes with focal paratubal cyst. Severe Acute and Chronic Cervicitis and endocervicitis with reactive chage and focal features suggestive of HPV cystopathic effect. Disordered proliferative phase endometrium. Adenomyosis and Leiomyomas.        Social History     Socioeconomic History   • Marital status: Significant Other     Spouse name: GRANT MARIN   • Number of children: 0   • Years of education: Not on file   • Highest education level: Not on file   Tobacco Use   • Smoking status: Former Smoker     Packs/day: 1.00     Types: Cigarettes     Last attempt to quit: 2018     Years since quittin.9   • Smokeless tobacco: Never Used   Substance and Sexual Activity   • Alcohol use: No   • Drug use: No   • Sexual activity: Yes     Partners: Male       Review of Systems   Constitution: Negative for chills, decreased appetite, fever and night sweats.   HENT: Negative for ear discharge, ear pain, hearing loss, nosebleeds and sore throat.    Eyes: Negative for blurred vision, double vision and pain.   Cardiovascular: Negative for cyanosis.   Respiratory: Negative for hemoptysis and sputum production.    Endocrine: Negative for cold intolerance and heat intolerance.   Hematologic/Lymphatic: Negative for adenopathy.   Skin: Negative for dry skin, itching, nail changes, rash and suspicious lesions.   Musculoskeletal: Negative for arthritis, gout, muscle cramps, muscle weakness, myalgias and neck pain.   Gastrointestinal: Negative for anorexia, bowel incontinence, constipation, diarrhea, dysphagia, hematemesis and jaundice.   Genitourinary: Negative for bladder incontinence, dysuria, flank pain, frequency, hematuria and nocturia.   Neurological: Negative for focal weakness, numbness, paresthesias and seizures.   Psychiatric/Behavioral: Negative for altered mental status, hallucinations, hypervigilance, suicidal ideas and thoughts of violence.   Allergic/Immunologic:  "Negative for persistent infections.         ECG 12 Lead  Date/Time: 4/15/2019 12:32 PM  Performed by: Ac Bansal III, MD  Authorized by: Ac Bansal III, MD   Comparison: compared with previous ECG   Similar to previous ECG  Rhythm: sinus rhythm  Rate: normal  Conduction: conduction normal  ST Segments: ST segments normal  T Waves: T waves normal  QRS axis: normal  Other: no other findings    Clinical impression: normal ECG               Objective:     Vitals:    04/15/19 1130   BP: 120/82   Pulse: 81   Weight: 82.1 kg (181 lb)   Height: 165.1 cm (65\")         Physical Exam   Constitutional: She is oriented to person, place, and time. She appears well-developed and well-nourished. No distress.   HENT:   Head: Normocephalic and atraumatic.   Nose: Nose normal.   Mouth/Throat: Oropharynx is clear and moist.   Eyes: Conjunctivae and EOM are normal. Pupils are equal, round, and reactive to light. Right eye exhibits no discharge. Left eye exhibits no discharge.   Neck: Normal range of motion. Neck supple. No tracheal deviation present. No thyromegaly present.   Cardiovascular: Normal rate, regular rhythm, S1 normal, S2 normal, normal heart sounds and normal pulses. Exam reveals no S3.   Pulmonary/Chest: Effort normal and breath sounds normal. No stridor. No respiratory distress. She exhibits no tenderness.   Abdominal: Soft. Bowel sounds are normal. She exhibits no distension and no mass. There is no tenderness. There is no rebound and no guarding.   Musculoskeletal: Normal range of motion. She exhibits no tenderness or deformity.   Lymphadenopathy:     She has no cervical adenopathy.   Neurological: She is alert and oriented to person, place, and time. She has normal reflexes.   Skin: Skin is warm and dry. No rash noted. She is not diaphoretic. No erythema.   Psychiatric: She has a normal mood and affect. Thought content normal.       Lab Review:             Performed        Assessment:          Diagnosis Plan "   1. Coronary artery disease involving native coronary artery of native heart without angina pectoris  ECG 12 Lead   2. Non-STEMI (non-ST elevated myocardial infarction) (CMS/HCC)  ECG 12 Lead   3. Ischemic cardiomyopathy  ECG 12 Lead   4. Mixed hyperlipidemia  ECG 12 Lead   5. Type 2 diabetes mellitus with other circulatory complication, without long-term current use of insulin (CMS/[facility])  ECG 12 Lead   6. History of coronary artery stent placement  ECG 12 Lead   7. Preop cardiovascular exam            Plan:       1. Coronary Artery Disease  Assessment  • The patient has no angina    Plan  • Lifestyle modifications discussed include adhering to a heart healthy diet, avoidance of tobacco products, maintenance of a healthy weight, medication compliance, regular exercise and regular monitoring of cholesterol and blood pressure    Subjective - Objective  • There has been a previous stent procedure using DAO  • Current antiplatelet therapy includes aspirin 81 mg and clopidogrel 75 mg  • The patient qualifies for cardiac rehabilitation, and has been referred to cardiac rehab      2.  History of non-ST segment elevation MI  3.  Ischemic myopathy-continue current medical therapy  4.  Diabetes mellitus with circulatory complication  5.  Mixed hyperlipidemia-continue lipid-lowering therapy  6.  Preoperative cardiac assessment-patient is at low cardiac risk at this point for the anticipated surgical procedure-both the hip surgery as well as the cataract surgery.  Clopidogrel was discontinued today and does not need to be resumed.    Thank you very much for allowing us to participate in the care of this pleasant patient.  Please don't hesitate to call if I can be of assistance in any way.      Current Outpatient Medications:   •  aspirin 81 MG tablet, Take 81 mg by mouth Daily., Disp: , Rfl:   •  carvedilol (COREG) 3.125 MG tablet, TAKE ONE TABLET BY MOUTH TWICE A DAY WITH MEALS, Disp: 180 tablet, Rfl: 0  •  Cholecalciferol  (VITAMIN D PO), Take 1 tablet by mouth Daily., Disp: , Rfl:   •  clopidogrel (PLAVIX) 75 MG tablet, TAKE ONE TABLET BY MOUTH DAILY, Disp: 90 tablet, Rfl: 2  •  metFORMIN (GLUCOPHAGE) 500 MG tablet, Take 500 mg by mouth Daily With Breakfast., Disp: , Rfl:   •  pantoprazole (PROTONIX) 40 MG EC tablet, TAKE ONE TABLET BY MOUTH DAILY FOR 30 DAYS, Disp: 90 tablet, Rfl: 2  •  pravastatin (PRAVACHOL) 10 MG tablet, Take 40 mg by mouth Daily., Disp: , Rfl:

## 2019-04-25 RX ORDER — CARVEDILOL 3.12 MG/1
TABLET ORAL
Qty: 180 TABLET | Refills: 0 | Status: SHIPPED | OUTPATIENT
Start: 2019-04-25 | End: 2019-08-14 | Stop reason: SDUPTHER

## 2019-08-14 RX ORDER — CARVEDILOL 3.12 MG/1
TABLET ORAL
Qty: 180 TABLET | Refills: 1 | Status: SHIPPED | OUTPATIENT
Start: 2019-08-14 | End: 2020-03-23

## 2020-03-23 RX ORDER — CARVEDILOL 3.12 MG/1
TABLET ORAL
Qty: 180 TABLET | Refills: 0 | Status: SHIPPED | OUTPATIENT
Start: 2020-03-23 | End: 2020-07-02

## 2020-03-30 ENCOUNTER — TELEPHONE (OUTPATIENT)
Dept: CARDIOLOGY | Facility: CLINIC | Age: 70
End: 2020-03-30

## 2020-03-30 NOTE — TELEPHONE ENCOUNTER
LM on pt vm to call back regarding her appointment. Would he mind doing a telephone or video chat with ?    Thanks Cathy

## 2020-04-17 ENCOUNTER — OFFICE VISIT (OUTPATIENT)
Dept: CARDIOLOGY | Facility: CLINIC | Age: 70
End: 2020-04-17

## 2020-04-17 VITALS
SYSTOLIC BLOOD PRESSURE: 114 MMHG | BODY MASS INDEX: 28.16 KG/M2 | HEART RATE: 64 BPM | HEIGHT: 65 IN | WEIGHT: 169 LBS | DIASTOLIC BLOOD PRESSURE: 70 MMHG

## 2020-04-17 DIAGNOSIS — E11.59 TYPE 2 DIABETES MELLITUS WITH OTHER CIRCULATORY COMPLICATION, WITHOUT LONG-TERM CURRENT USE OF INSULIN (HCC): Chronic | ICD-10-CM

## 2020-04-17 DIAGNOSIS — I21.4 NON-STEMI (NON-ST ELEVATED MYOCARDIAL INFARCTION) (HCC): Chronic | ICD-10-CM

## 2020-04-17 DIAGNOSIS — E78.2 MIXED HYPERLIPIDEMIA: Chronic | ICD-10-CM

## 2020-04-17 DIAGNOSIS — Z95.5 HISTORY OF CORONARY ARTERY STENT PLACEMENT: Chronic | ICD-10-CM

## 2020-04-17 DIAGNOSIS — I25.10 CORONARY ARTERY DISEASE INVOLVING NATIVE CORONARY ARTERY OF NATIVE HEART WITHOUT ANGINA PECTORIS: Primary | Chronic | ICD-10-CM

## 2020-04-17 DIAGNOSIS — I25.5 ISCHEMIC CARDIOMYOPATHY: Chronic | ICD-10-CM

## 2020-04-17 DIAGNOSIS — Z72.0 TOBACCO ABUSE: Chronic | ICD-10-CM

## 2020-04-17 PROCEDURE — 99442 PR PHYS/QHP TELEPHONE EVALUATION 11-20 MIN: CPT | Performed by: INTERNAL MEDICINE

## 2020-04-17 RX ORDER — DOXYCYCLINE 100 MG/1
1 TABLET ORAL 2 TIMES DAILY
COMMUNITY
Start: 2020-04-10 | End: 2021-05-18

## 2020-04-17 NOTE — PROGRESS NOTES
Subjective:     Encounter Date:  04/17/20      Patient ID: Tonya Mccall is a 70 y.o. female.    Chief Complaint: CAD  History of Present Illness    Dear Dr. Vance,    This patient has consented to a telehealth visit via aud. The visit was scheduled as a audio visit to comply with patient safety concerns in accordance with CDC recommendations.  All vitals recorded within this visit are reported by the patient.  I spent  18 minutes in total including but not limited to the 10 minutes spent in direct conversation with this patient.     She has been doing great without any cardiac complaints.  She denies any chest pain, pressure, tightness, squeezing, or heartburn.  She has not experienced any feeling of palpitations, tachycardia or heart racing and no presyncope or syncope.  There have not been any problems with dizziness or lightheadedness.  There have not been any orthopnea or PND, and no problems with lower extremity edema.  She denies any shortness of breath at rest or with activity and has not had any wheezing.  She has not had any problems with unexplained nausea or vomiting. She has continued to perform daily activities of living without any specific problem or change in the level of activity.  She has not been recently hospitalized for any reason.  She had hip surgery performed since her last visit and then went very well.  She also had cataract surgery performed that also went well.     On 4/21/2018 she presented to the emergency room with chest pain and ruled in for a non-STEMI.  She underwent cardiac catheterization on 4/22/2018.  This showed an EF of around 30%, 20% left main, 90% mid LAD, 20-30% distal LAD, 30% diagonal, 50% circumflex, and a 50% mid RCA lesion.  She underwent successful drug-eluting stent placement to the mid LAD lesion.  An echocardiogram obtained the following day showed normalization of her EF at 54% and no significant valvular abnormalities.  Antiplatelet therapy was recommended  for one year.    The following portions of the patient's history were reviewed and updated as appropriate: allergies, current medications, past family history, past medical history, past social history, past surgical history and problem list.    Past Medical History:   Diagnosis Date   • Atelectasis     right   • Breast nodule    • CAD (coronary artery disease)    • COPD (chronic obstructive pulmonary disease) (CMS/[facility])    • Dyspareunia in female    • Elevated cholesterol    • GERD (gastroesophageal reflux disease)    • Herpes genitalis    • Hiatal hernia    • High cholesterol    • History of coronary artery stent placement    • History of Papanicolaou smear of cervix 2013    Pap smears hve all been normal since 2004.  They frequently have shown Trichomonas infections to be present.  2013, normal Pap smear, negative HR-HPV.  Next pap 2016.    • Hormone replacement therapy (postmenopausal)    • HPV in female    • Hyperlipidemia    • Hypertension    • Hypertriglyceridemia    • Ischemic cardiomyopathy    • LV dysfunction     with ejection fraction 30%   • Non-STEMI (non-ST elevated myocardial infarction) (CMS/[facility])    • Ovarian cystadenoma    • Pulmonary vascular congestion    • Senile (atrophic) vaginitis    • Soft tissue tumor     NECK   • Tobacco abuse    • Trichomonal vaginitis    • Type 2 diabetes mellitus with circulatory disorder (CMS/[facility]) 4/23/2018   • Uterine leiomyoma        Past Surgical History:   Procedure Laterality Date   • APPENDECTOMY     • CARDIAC CATHETERIZATION N/A 4/22/2018    Procedure: Left Heart Cath and Cors;  Surgeon: Srikanth Rod MD;  Location: [facility] CATH INVASIVE LOCATION;  Service: Cardiovascular   • CARDIAC CATHETERIZATION N/A 4/22/2018    Procedure: Peripheral angiography;  Surgeon: Srikanth Rod MD;  Location: [facility] CATH INVASIVE LOCATION;  Service: Cardiovascular   • CARDIAC CATHETERIZATION N/A 4/22/2018    Procedure: Stent DAO coronary;  Surgeon: Srikanth Rod MD;  Location:   [facility] CATH INVASIVE LOCATION;  Service: Cardiovascular   • DILATATION AND CURETTAGE     • ESSURE TUBAL LIGATION     • TONSILLECTOMY     • TOTAL ABDOMINAL HYSTERECTOMY Bilateral 2006    ASHLEE, BSO. Pathology showed serous cystadenoma/cystadenofibroma , bilaterally of both tubes with focal paratubal cyst. Severe Acute and Chronic Cervicitis and endocervicitis with reactive chage and focal features suggestive of HPV cystopathic effect. Disordered proliferative phase endometrium. Adenomyosis and Leiomyomas.        Social History     Socioeconomic History   • Marital status: Significant Other     Spouse name: GRANT MARIN   • Number of children: 0   • Years of education: Not on file   • Highest education level: Not on file   Tobacco Use   • Smoking status: Former Smoker     Packs/day: 1.00     Types: Cigarettes     Last attempt to quit: 2018     Years since quittin.9   • Smokeless tobacco: Never Used   Substance and Sexual Activity   • Alcohol use: No   • Drug use: No   • Sexual activity: Yes     Partners: Male       Review of Systems   Constitution: Negative for chills, decreased appetite, fever and night sweats.   HENT: Negative for ear discharge, ear pain, hearing loss, nosebleeds and sore throat.    Eyes: Negative for blurred vision, double vision and pain.   Cardiovascular: Negative for cyanosis.   Respiratory: Negative for hemoptysis and sputum production.    Endocrine: Negative for cold intolerance and heat intolerance.   Hematologic/Lymphatic: Negative for adenopathy.   Skin: Negative for dry skin, itching, nail changes, rash and suspicious lesions.   Musculoskeletal: Negative for arthritis, gout, muscle cramps, muscle weakness, myalgias and neck pain.   Gastrointestinal: Negative for anorexia, bowel incontinence, constipation, diarrhea, dysphagia, hematemesis and jaundice.   Genitourinary: Negative for bladder incontinence, dysuria, flank pain, frequency, hematuria and nocturia.   Neurological: Negative for  "focal weakness, numbness, paresthesias and seizures.   Psychiatric/Behavioral: Negative for altered mental status, hallucinations, hypervigilance, suicidal ideas and thoughts of violence.   Allergic/Immunologic: Negative for persistent infections.       Procedures       Objective:     Vitals:    04/17/20 1308   BP: 114/70   Pulse: 64   Weight: 76.7 kg (169 lb)   Height: 165.1 cm (65\")     Alert and oriented x3, thought processes normal, judgment normal, speaking in full sentences with no wheezing and no respiratory distress. Hearing is appropriate without difficulty.      Lab Review:             Performed        Assessment:          Diagnosis Plan   1. Coronary artery disease involving native coronary artery of native heart without angina pectoris     2. Type 2 diabetes mellitus with other circulatory complication, without long-term current use of insulin (CMS/[facility])     3. Ischemic cardiomyopathy     4. Mixed hyperlipidemia     5. Non-STEMI (non-ST elevated myocardial infarction) (CMS/[facility])     6. Tobacco abuse     7. History of coronary artery stent placement            Plan:       1. Coronary Artery Disease  Assessment  • The patient has no angina    Plan  • Lifestyle modifications discussed include adhering to a heart healthy diet, avoidance of tobacco products, maintenance of a healthy weight, medication compliance, regular exercise and regular monitoring of cholesterol and blood pressure    Subjective - Objective  • There has been a previous stent procedure using DAO  • Current antiplatelet therapy includes aspirin 81 mg and clopidogrel 75 mg  • The patient qualifies for cardiac rehabilitation, and has been referred to cardiac rehab      2.  History of non-ST segment elevation MI  3.  Ischemic myopathy-continue current medical therapy  4.  Diabetes mellitus with circulatory complication  5.  Mixed hyperlipidemia-continue lipid-lowering therapy      Thank you very much for allowing us to participate in the care of " this pleasant patient.  Please don't hesitate to call if I can be of assistance in any way.      Current Outpatient Medications:   •  aspirin 81 MG tablet, Take 81 mg by mouth Daily., Disp: , Rfl:   •  carvedilol (COREG) 3.125 MG tablet, TAKE ONE TABLET BY MOUTH TWICE A DAY WITH MEALS, Disp: 180 tablet, Rfl: 0  •  metFORMIN (GLUCOPHAGE) 500 MG tablet, Take 500 mg by mouth 2 (Two) Times a Day With Meals., Disp: , Rfl:   •  pravastatin (PRAVACHOL) 40 MG tablet, Take 40 mg by mouth Daily., Disp: , Rfl:   •  doxycycline (ADOXA) 100 MG tablet, Take 1 tablet by mouth 2 (Two) Times a Day., Disp: , Rfl:

## 2020-07-02 RX ORDER — CARVEDILOL 3.12 MG/1
TABLET ORAL
Qty: 180 TABLET | Refills: 0 | Status: SHIPPED | OUTPATIENT
Start: 2020-07-02 | End: 2020-10-16

## 2020-10-16 RX ORDER — CARVEDILOL 3.12 MG/1
TABLET ORAL
Qty: 180 TABLET | Refills: 0 | Status: SHIPPED | OUTPATIENT
Start: 2020-10-16 | End: 2021-01-19

## 2021-01-19 RX ORDER — CARVEDILOL 3.12 MG/1
TABLET ORAL
Qty: 180 TABLET | Refills: 0 | Status: SHIPPED | OUTPATIENT
Start: 2021-01-19 | End: 2021-05-03

## 2021-03-09 DIAGNOSIS — Z23 IMMUNIZATION DUE: ICD-10-CM

## 2021-05-03 ENCOUNTER — TELEPHONE (OUTPATIENT)
Dept: CARDIOLOGY | Facility: CLINIC | Age: 71
End: 2021-05-03

## 2021-05-03 RX ORDER — CARVEDILOL 3.12 MG/1
TABLET ORAL
Qty: 180 TABLET | Refills: 0 | Status: SHIPPED | OUTPATIENT
Start: 2021-05-03 | End: 2021-08-03 | Stop reason: SDUPTHER

## 2021-05-04 NOTE — TELEPHONE ENCOUNTER
Her pharmacy is calling for refill. She is overdue for an appointment. Please call to schedule appt with myself or Dr. Bansal. I have given her one refill. Thanks.

## 2021-05-07 NOTE — TELEPHONE ENCOUNTER
Scheduling- Can you please call pt to get set up for a follow up with Maggie or Dr Bansal?      Thank you  [phone]

## 2021-05-18 ENCOUNTER — OFFICE VISIT (OUTPATIENT)
Dept: CARDIOLOGY | Facility: CLINIC | Age: 71
End: 2021-05-18

## 2021-05-18 VITALS
HEART RATE: 75 BPM | HEIGHT: 65 IN | WEIGHT: 163 LBS | DIASTOLIC BLOOD PRESSURE: 82 MMHG | SYSTOLIC BLOOD PRESSURE: 120 MMHG | BODY MASS INDEX: 27.16 KG/M2

## 2021-05-18 DIAGNOSIS — E78.2 MIXED HYPERLIPIDEMIA: Chronic | ICD-10-CM

## 2021-05-18 DIAGNOSIS — I21.4 NON-STEMI (NON-ST ELEVATED MYOCARDIAL INFARCTION) (HCC): Chronic | ICD-10-CM

## 2021-05-18 DIAGNOSIS — Z95.5 HISTORY OF CORONARY ARTERY STENT PLACEMENT: Chronic | ICD-10-CM

## 2021-05-18 DIAGNOSIS — E11.59 TYPE 2 DIABETES MELLITUS WITH OTHER CIRCULATORY COMPLICATION, WITHOUT LONG-TERM CURRENT USE OF INSULIN (HCC): Chronic | ICD-10-CM

## 2021-05-18 DIAGNOSIS — I25.119 CORONARY ARTERY DISEASE INVOLVING NATIVE CORONARY ARTERY OF NATIVE HEART WITH ANGINA PECTORIS (HCC): Primary | ICD-10-CM

## 2021-05-18 PROCEDURE — 99214 OFFICE O/P EST MOD 30 MIN: CPT | Performed by: INTERNAL MEDICINE

## 2021-05-18 PROCEDURE — 93000 ELECTROCARDIOGRAM COMPLETE: CPT | Performed by: INTERNAL MEDICINE

## 2021-05-18 NOTE — PROGRESS NOTES
Subjective:     Encounter Date:  05/18/21      Patient ID: Tonya Mccall is a 71 y.o. female.    Chief Complaint: CAD  History of Present Illness    Dear Dr. Simon,    I had the pleasure seeing this patient in the office today for follow-up of her cardiac status.  She has a history of coronary artery disease.  She has a history of drug-eluting stent in the mid LAD.    Is been 1 year since her last visit.  Recently she has been having occasional episodes of mid precordial chest discomfort.  Says it is just a little bit of a twinge, does not hurt too badly, and she cannot identify any particular activity that brings it on.  No associated nausea, vomiting, or diaphoresis.  She has not had an ischemic evaluation since her cardiac catheterization in 2018.     On 4/21/2018 she presented to the emergency room with chest pain and ruled in for a non-STEMI.  She underwent cardiac catheterization on 4/22/2018.  This showed an EF of around 30%, 20% left main, 90% mid LAD, 20-30% distal LAD, 30% diagonal, 50% circumflex, and a 50% mid RCA lesion.  She underwent successful drug-eluting stent placement to the mid LAD lesion.  An echocardiogram obtained the following day showed normalization of her EF at 54% and no significant valvular abnormalities.  Antiplatelet therapy was recommended for one year.    The following portions of the patient's history were reviewed and updated as appropriate: allergies, current medications, past family history, past medical history, past social history, past surgical history and problem list.    Past Medical History:   Diagnosis Date   • Atelectasis     right   • Breast nodule    • CAD (coronary artery disease)    • COPD (chronic obstructive pulmonary disease) (CMS/[facility])    • Dyspareunia in female    • Elevated cholesterol    • GERD (gastroesophageal reflux disease)    • Herpes genitalis    • Hiatal hernia    • High cholesterol    • History of coronary artery stent placement    • History of  Papanicolaou smear of cervix 2013    Pap smears hve all been normal since 2004.  They frequently have shown Trichomonas infections to be present.  2013, normal Pap smear, negative HR-HPV.  Next pap 2016.    • Hormone replacement therapy (postmenopausal)    • HPV in female    • Hyperlipidemia    • Hypertension    • Hypertriglyceridemia    • Ischemic cardiomyopathy    • LV dysfunction     with ejection fraction 30%   • Non-STEMI (non-ST elevated myocardial infarction) (CMS/[facility])    • Ovarian cystadenoma    • Pulmonary vascular congestion    • Senile (atrophic) vaginitis    • Soft tissue tumor     NECK   • Tobacco abuse    • Trichomonal vaginitis    • Type 2 diabetes mellitus with circulatory disorder (CMS/[facility]) 4/23/2018   • Uterine leiomyoma        Past Surgical History:   Procedure Laterality Date   • APPENDECTOMY     • CARDIAC CATHETERIZATION N/A 4/22/2018    Procedure: Left Heart Cath and Cors;  Surgeon: Srikanth Rod MD;  Location:  [facility] CATH INVASIVE LOCATION;  Service: Cardiovascular   • CARDIAC CATHETERIZATION N/A 4/22/2018    Procedure: Peripheral angiography;  Surgeon: Srikanth Rod MD;  Location:  [facility] CATH INVASIVE LOCATION;  Service: Cardiovascular   • CARDIAC CATHETERIZATION N/A 4/22/2018    Procedure: Stent DAO coronary;  Surgeon: Srikanth Rod MD;  Location:  [facility] CATH INVASIVE LOCATION;  Service: Cardiovascular   • DILATATION AND CURETTAGE     • ESSURE TUBAL LIGATION     • TONSILLECTOMY     • TOTAL ABDOMINAL HYSTERECTOMY Bilateral 2006    ASHLEE, BSO. Pathology showed serous cystadenoma/cystadenofibroma , bilaterally of both tubes with focal paratubal cyst. Severe Acute and Chronic Cervicitis and endocervicitis with reactive chage and focal features suggestive of HPV cystopathic effect. Disordered proliferative phase endometrium. Adenomyosis and Leiomyomas.              ECG 12 Lead    Date/Time: 5/18/2021 11:02 AM  Performed by: Ac Bansal III, MD  Authorized by: Ac Bansal III, MD  "  Comparison: compared with previous ECG   Similar to previous ECG  Rhythm: sinus rhythm  Rate: normal  Conduction: conduction normal  ST Segments: ST segments normal  T Waves: T waves normal  QRS axis: normal  Other: no other findings    Clinical impression: normal ECG               Objective:     Vitals:    05/18/21 1037   BP: 120/82   Pulse: 75   Weight: 73.9 kg (163 lb)   Height: 165.1 cm (65\")     General Appearance:    Alert, cooperative, in no acute distress   Head:    Normocephalic, without obvious abnormality, atraumatic   Eyes:            Lids and lashes normal, conjunctivae and sclerae normal, no   icterus, no pallor, corneas clear   Ears:    Ears appear intact with no abnormalities noted   Throat:   No oral lesions, no thrush, oral mucosa moist   Neck:   No adenopathy, supple, trachea midline, no thyromegaly, no   carotid bruit, no JVD   Back:     No kyphosis present, no scoliosis present, no skin lesions, erythema or scars, no tenderness to percussion or palpation, range of motion normal   Lungs:     Clear to auscultation,respirations regular, even and unlabored    Heart:    Regular rhythm and normal rate, normal S1 and S2, no murmur, no gallop, no rub, no click   Chest Wall:    No abnormalities observed   Abdomen:     Normal bowel sounds, no masses, no organomegaly, soft        non-tender, non-distended, no guarding, no rebound  tenderness   Extremities:   Moves all extremities well, no edema, no cyanosis, no redness   Pulses:   Pulses palpable and equal bilaterally. Normal radial, carotid, femoral, dorsalis pedis and posterior tibial pulses bilaterally. Normal abdominal aorta   Skin:  Psychiatric:   No bleeding, bruising or rash    Alert and oriented x 3, normal mood and affect           Lab Review:             Lab Results   Component Value Date    GLUCOSE 153 (H) 04/23/2018    BUN 20 05/10/2019    CREATININE 0.7 05/10/2019    EGFRIFNONA 80 04/23/2018    BCR 27.4 05/10/2019    K 4.1 05/10/2019    CO2 " 21 (L) 05/10/2019    CALCIUM 8.1 (L) 05/10/2019    ALBUMIN 4.3 04/16/2019    LABIL2 1.2 04/16/2019    AST 27 04/16/2019    ALT 19 04/16/2019         Results for orders placed during the hospital encounter of 04/21/18    Adult Transthoracic Echo Complete W/ Cont if Necessary Per Protocol    Interpretation Summary  · Left ventricular wall thickness is consistent with mild concentric hypertrophy.  · Left ventricular systolic function is normal. Estimated EF = 54%.            Assessment:          Diagnosis Plan   1. Coronary artery disease involving native coronary artery of native heart with angina pectoris (CMS/[facility])  ECG 12 Lead    Stress Test With Myocardial Perfusion One Day   2. History of coronary artery stent placement  ECG 12 Lead    Stress Test With Myocardial Perfusion One Day   3. Mixed hyperlipidemia  ECG 12 Lead    Stress Test With Myocardial Perfusion One Day   4. Non-STEMI (non-ST elevated myocardial infarction) (CMS/[facility])  ECG 12 Lead    Stress Test With Myocardial Perfusion One Day   5. Type 2 diabetes mellitus with other circulatory complication, without long-term current use of insulin (CMS/[facility])  ECG 12 Lead    Stress Test With Myocardial Perfusion One Day          Plan:       1. Coronary Artery Disease  Plan  • Lifestyle modifications discussed include adhering to a heart healthy diet, avoidance of tobacco products, maintenance of a healthy weight, medication compliance, regular exercise and regular monitoring of cholesterol and blood pressure  • Patient reports new episodes of left precordial chest discomfort.  Is been 3 years since her cardiac catheterization and no ischemic evaluation in the meantime, we will arrange for a stress Cardiolite    Subjective - Objective  • There has been a previous stent procedure using DAO  • Current antiplatelet therapy includes aspirin 81 mg and clopidogrel 75 mg  • The patient qualifies for cardiac rehabilitation, and has been referred to cardiac rehab      2.   History of non-ST segment elevation MI  3.  Ischemic cardiomyopathy-continue current medical therapy with the carvedilol and aspirin, we will await the results on the stress test  4.  Diabetes mellitus with circulatory complication  5.  Mixed hyperlipidemia-continue lipid-lowering therapy with pravastatin      Thank you very much for allowing us to participate in the care of this pleasant patient.  Please don't hesitate to call if I can be of assistance in any way.      Current Outpatient Medications:   •  aspirin 81 MG tablet, Take 81 mg by mouth Daily., Disp: , Rfl:   •  carvedilol (COREG) 3.125 MG tablet, TAKE ONE TABLET BY MOUTH TWICE A DAY WITH MEALS, Disp: 180 tablet, Rfl: 0  •  metFORMIN (GLUCOPHAGE) 500 MG tablet, TAKE ONE TABLET BY MOUTH TWICE A DAY, Disp: 180 tablet, Rfl: 0  •  pravastatin (PRAVACHOL) 40 MG tablet, Take 40 mg by mouth Daily., Disp: , Rfl:

## 2021-05-20 ENCOUNTER — HOSPITAL ENCOUNTER (OUTPATIENT)
Dept: CARDIOLOGY | Facility: HOSPITAL | Age: 71
Discharge: HOME | End: 2021-05-20
Admitting: INTERNAL MEDICINE

## 2021-05-20 VITALS — HEIGHT: 65 IN | WEIGHT: 162.92 LBS | BODY MASS INDEX: 27.14 KG/M2

## 2021-05-20 DIAGNOSIS — E11.59 TYPE 2 DIABETES MELLITUS WITH OTHER CIRCULATORY COMPLICATION, WITHOUT LONG-TERM CURRENT USE OF INSULIN (HCC): ICD-10-CM

## 2021-05-20 DIAGNOSIS — I25.119 CORONARY ARTERY DISEASE INVOLVING NATIVE CORONARY ARTERY OF NATIVE HEART WITH ANGINA PECTORIS (HCC): ICD-10-CM

## 2021-05-20 DIAGNOSIS — Z95.5 HISTORY OF CORONARY ARTERY STENT PLACEMENT: ICD-10-CM

## 2021-05-20 DIAGNOSIS — E78.2 MIXED HYPERLIPIDEMIA: ICD-10-CM

## 2021-05-20 DIAGNOSIS — I21.4 NON-STEMI (NON-ST ELEVATED MYOCARDIAL INFARCTION) (HCC): ICD-10-CM

## 2021-05-20 LAB
BH CV NUCLEAR PRIOR STUDY: 2
BH CV REST NUCLEAR ISOTOPE DOSE: 10.2 MCI
BH CV STRESS BP STAGE 1: NORMAL
BH CV STRESS DURATION MIN STAGE 1: 3
BH CV STRESS DURATION MIN STAGE 2: 2
BH CV STRESS DURATION SEC STAGE 1: 0
BH CV STRESS DURATION SEC STAGE 2: 30
BH CV STRESS GRADE STAGE 1: 10
BH CV STRESS GRADE STAGE 2: 8
BH CV STRESS HR STAGE 1: 120
BH CV STRESS HR STAGE 2: 133
BH CV STRESS METS STAGE 1: 5
BH CV STRESS METS STAGE 2: 6
BH CV STRESS NUCLEAR ISOTOPE DOSE: 35.6 MCI
BH CV STRESS PROTOCOL 1: NORMAL
BH CV STRESS RECOVERY BP: NORMAL MMHG
BH CV STRESS RECOVERY HR: 79 BPM
BH CV STRESS SPEED STAGE 1: 1.7
BH CV STRESS SPEED STAGE 2: 1.9
BH CV STRESS STAGE 1: 1
BH CV STRESS STAGE 2: 2
LV EF NUC BP: 68 %
MAXIMAL PREDICTED HEART RATE: 148 BPM
PERCENT MAX PREDICTED HR: 89.86 %
STRESS BASELINE BP: NORMAL MMHG
STRESS BASELINE HR: 74 BPM
STRESS PERCENT HR: 106 %
STRESS POST ESTIMATED WORKLOAD: 6 METS
STRESS POST EXERCISE DUR MIN: 5 MIN
STRESS POST EXERCISE DUR SEC: 30 SEC
STRESS POST PEAK BP: NORMAL MMHG
STRESS POST PEAK HR: 133 BPM
STRESS TARGET HR: 126 BPM

## 2021-05-20 PROCEDURE — 93016 CV STRESS TEST SUPVJ ONLY: CPT | Performed by: INTERNAL MEDICINE

## 2021-05-20 PROCEDURE — 78452 HT MUSCLE IMAGE SPECT MULT: CPT

## 2021-05-20 PROCEDURE — 93018 CV STRESS TEST I&R ONLY: CPT | Performed by: INTERNAL MEDICINE

## 2021-05-20 PROCEDURE — A9502 TC99M TETROFOSMIN: HCPCS | Performed by: INTERNAL MEDICINE

## 2021-05-20 PROCEDURE — 0 TECHNETIUM TETROFOSMIN KIT: Performed by: INTERNAL MEDICINE

## 2021-05-20 PROCEDURE — 93017 CV STRESS TEST TRACING ONLY: CPT

## 2021-05-20 PROCEDURE — 78452 HT MUSCLE IMAGE SPECT MULT: CPT | Performed by: INTERNAL MEDICINE

## 2021-05-20 RX ADMIN — TETROFOSMIN 1 DOSE: 1.38 INJECTION, POWDER, LYOPHILIZED, FOR SOLUTION INTRAVENOUS at 13:24

## 2021-05-20 RX ADMIN — TETROFOSMIN 1 DOSE: 1.38 INJECTION, POWDER, LYOPHILIZED, FOR SOLUTION INTRAVENOUS at 14:19

## 2021-05-21 ENCOUNTER — TELEPHONE (OUTPATIENT)
Dept: CARDIOLOGY | Facility: CLINIC | Age: 71
End: 2021-05-21

## 2021-05-21 NOTE — TELEPHONE ENCOUNTER
----- Message from Jose Pitt MD sent at 5/21/2021  2:16 PM EDT -----  It's okay to let her know this is normal, and her ejection fraction has normalized as well!  No changes to meds.  Make sure JA can see the results upon his return.    Frida choudhury

## 2021-07-22 ENCOUNTER — HOSPITAL ENCOUNTER (INPATIENT)
Facility: HOSPITAL | Age: 71
LOS: 2 days | Discharge: HOME | End: 2021-07-24
Attending: EMERGENCY MEDICINE | Admitting: INTERNAL MEDICINE

## 2021-07-22 DIAGNOSIS — I21.3 ST ELEVATION MYOCARDIAL INFARCTION (STEMI), UNSPECIFIED ARTERY (HCC): Primary | ICD-10-CM

## 2021-07-22 LAB
ALBUMIN SERPL-MCNC: 3.2 G/DL (ref 3.5–5.2)
ALBUMIN/GLOB SERPL: 1.3 G/DL
ALP SERPL-CCNC: 54 U/L (ref 39–117)
ALT SERPL W P-5'-P-CCNC: 23 U/L (ref 1–33)
ANION GAP SERPL CALCULATED.3IONS-SCNC: 11.5 MMOL/L (ref 5–15)
AST SERPL-CCNC: 28 U/L (ref 1–32)
BASOPHILS # BLD AUTO: 0.05 10*3/MM3 (ref 0–0.2)
BASOPHILS NFR BLD AUTO: 0.5 % (ref 0–1.5)
BILIRUB SERPL-MCNC: 0.3 MG/DL (ref 0–1.2)
BUN SERPL-MCNC: 11 MG/DL (ref 8–23)
BUN/CREAT SERPL: 16.2 (ref 7–25)
CALCIUM SPEC-SCNC: 7.9 MG/DL (ref 8.6–10.5)
CHLORIDE SERPL-SCNC: 100 MMOL/L (ref 98–107)
CO2 SERPL-SCNC: 21.5 MMOL/L (ref 22–29)
CREAT SERPL-MCNC: 0.68 MG/DL (ref 0.57–1)
DEPRECATED RDW RBC AUTO: 43.7 FL (ref 37–54)
EOSINOPHIL # BLD AUTO: 0.22 10*3/MM3 (ref 0–0.4)
EOSINOPHIL NFR BLD AUTO: 2.3 % (ref 0.3–6.2)
ERYTHROCYTE [DISTWIDTH] IN BLOOD BY AUTOMATED COUNT: 12.8 % (ref 12.3–15.4)
GFR SERPL CREATININE-BSD FRML MDRD: 85 ML/MIN/1.73
GLOBULIN UR ELPH-MCNC: 2.5 GM/DL
GLUCOSE SERPL-MCNC: 176 MG/DL (ref 65–99)
HCT VFR BLD AUTO: 38.1 % (ref 34–46.6)
HGB BLD-MCNC: 13 G/DL (ref 12–15.9)
IMM GRANULOCYTES # BLD AUTO: 0.04 10*3/MM3 (ref 0–0.05)
IMM GRANULOCYTES NFR BLD AUTO: 0.4 % (ref 0–0.5)
LYMPHOCYTES # BLD AUTO: 2.63 10*3/MM3 (ref 0.7–3.1)
LYMPHOCYTES NFR BLD AUTO: 27.2 % (ref 19.6–45.3)
MCH RBC QN AUTO: 31.9 PG (ref 26.6–33)
MCHC RBC AUTO-ENTMCNC: 34.1 G/DL (ref 31.5–35.7)
MCV RBC AUTO: 93.4 FL (ref 79–97)
MONOCYTES # BLD AUTO: 0.57 10*3/MM3 (ref 0.1–0.9)
MONOCYTES NFR BLD AUTO: 5.9 % (ref 5–12)
NEUTROPHILS NFR BLD AUTO: 6.17 10*3/MM3 (ref 1.7–7)
NEUTROPHILS NFR BLD AUTO: 63.7 % (ref 42.7–76)
NRBC BLD AUTO-RTO: 0 /100 WBC (ref 0–0.2)
PLATELET # BLD AUTO: 186 10*3/MM3 (ref 140–450)
PMV BLD AUTO: 10.9 FL (ref 6–12)
POTASSIUM SERPL-SCNC: 3.1 MMOL/L (ref 3.5–5.2)
PROT SERPL-MCNC: 5.7 G/DL (ref 6–8.5)
RBC # BLD AUTO: 4.08 10*6/MM3 (ref 3.77–5.28)
SODIUM SERPL-SCNC: 133 MMOL/L (ref 136–145)
TROPONIN T SERPL-MCNC: 0.21 NG/ML (ref 0–0.03)
WBC # BLD AUTO: 9.68 10*3/MM3 (ref 3.4–10.8)

## 2021-07-22 PROCEDURE — 85347 COAGULATION TIME ACTIVATED: CPT

## 2021-07-22 PROCEDURE — 93005 ELECTROCARDIOGRAM TRACING: CPT | Performed by: INTERNAL MEDICINE

## 2021-07-22 PROCEDURE — C1769 GUIDE WIRE: HCPCS | Performed by: INTERNAL MEDICINE

## 2021-07-22 PROCEDURE — C1874 STENT, COATED/COV W/DEL SYS: HCPCS | Performed by: INTERNAL MEDICINE

## 2021-07-22 PROCEDURE — 25010000002 HEPARIN (PORCINE) PER 1000 UNITS: Performed by: INTERNAL MEDICINE

## 2021-07-22 PROCEDURE — 0 IOPAMIDOL PER 1 ML: Performed by: INTERNAL MEDICINE

## 2021-07-22 PROCEDURE — 93458 L HRT ARTERY/VENTRICLE ANGIO: CPT | Performed by: INTERNAL MEDICINE

## 2021-07-22 PROCEDURE — 84484 ASSAY OF TROPONIN QUANT: CPT | Performed by: INTERNAL MEDICINE

## 2021-07-22 PROCEDURE — 85025 COMPLETE CBC W/AUTO DIFF WBC: CPT | Performed by: INTERNAL MEDICINE

## 2021-07-22 PROCEDURE — C1760 CLOSURE DEV, VASC: HCPCS | Performed by: INTERNAL MEDICINE

## 2021-07-22 PROCEDURE — 99153 MOD SED SAME PHYS/QHP EA: CPT | Performed by: INTERNAL MEDICINE

## 2021-07-22 PROCEDURE — 80053 COMPREHEN METABOLIC PANEL: CPT | Performed by: INTERNAL MEDICINE

## 2021-07-22 PROCEDURE — 25010000002 HEPARIN (PORCINE) PER 1000 UNITS: Performed by: EMERGENCY MEDICINE

## 2021-07-22 PROCEDURE — C1725 CATH, TRANSLUMIN NON-LASER: HCPCS | Performed by: INTERNAL MEDICINE

## 2021-07-22 PROCEDURE — C1887 CATHETER, GUIDING: HCPCS | Performed by: INTERNAL MEDICINE

## 2021-07-22 PROCEDURE — 4A023N7 MEASUREMENT OF CARDIAC SAMPLING AND PRESSURE, LEFT HEART, PERCUTANEOUS APPROACH: ICD-10-PCS | Performed by: INTERNAL MEDICINE

## 2021-07-22 PROCEDURE — 93010 ELECTROCARDIOGRAM REPORT: CPT | Performed by: INTERNAL MEDICINE

## 2021-07-22 PROCEDURE — C1894 INTRO/SHEATH, NON-LASER: HCPCS | Performed by: INTERNAL MEDICINE

## 2021-07-22 PROCEDURE — 92941 PRQ TRLML REVSC TOT OCCL AMI: CPT | Performed by: INTERNAL MEDICINE

## 2021-07-22 PROCEDURE — B2151ZZ FLUOROSCOPY OF LEFT HEART USING LOW OSMOLAR CONTRAST: ICD-10-PCS | Performed by: INTERNAL MEDICINE

## 2021-07-22 PROCEDURE — 027034Z DILATION OF CORONARY ARTERY, ONE ARTERY WITH DRUG-ELUTING INTRALUMINAL DEVICE, PERCUTANEOUS APPROACH: ICD-10-PCS | Performed by: INTERNAL MEDICINE

## 2021-07-22 PROCEDURE — B2111ZZ FLUOROSCOPY OF MULTIPLE CORONARY ARTERIES USING LOW OSMOLAR CONTRAST: ICD-10-PCS | Performed by: INTERNAL MEDICINE

## 2021-07-22 PROCEDURE — 25010000002 MIDAZOLAM PER 1 MG: Performed by: INTERNAL MEDICINE

## 2021-07-22 PROCEDURE — 99152 MOD SED SAME PHYS/QHP 5/>YRS: CPT | Performed by: INTERNAL MEDICINE

## 2021-07-22 PROCEDURE — C9606 PERC D-E COR REVASC W AMI S: HCPCS | Performed by: INTERNAL MEDICINE

## 2021-07-22 PROCEDURE — 25010000002 FENTANYL CITRATE (PF) 50 MCG/ML SOLUTION: Performed by: INTERNAL MEDICINE

## 2021-07-22 PROCEDURE — 99284 EMERGENCY DEPT VISIT MOD MDM: CPT

## 2021-07-22 PROCEDURE — 99223 1ST HOSP IP/OBS HIGH 75: CPT | Performed by: INTERNAL MEDICINE

## 2021-07-22 DEVICE — XIENCE SIERRA™ EVEROLIMUS ELUTING CORONARY STENT SYSTEM 3.00 MM X 28 MM / RAPID-EXCHANGE
Type: IMPLANTABLE DEVICE | Status: FUNCTIONAL
Brand: XIENCE SIERRA™

## 2021-07-22 RX ORDER — SODIUM CHLORIDE 9 MG/ML
INJECTION, SOLUTION INTRAVENOUS CONTINUOUS PRN
Status: COMPLETED | OUTPATIENT
Start: 2021-07-22 | End: 2021-07-22

## 2021-07-22 RX ORDER — ONDANSETRON 4 MG/1
4 TABLET, FILM COATED ORAL EVERY 6 HOURS PRN
Status: DISCONTINUED | OUTPATIENT
Start: 2021-07-22 | End: 2021-07-24 | Stop reason: HOSPADM

## 2021-07-22 RX ORDER — HEPARIN SODIUM 1000 [USP'U]/ML
INJECTION, SOLUTION INTRAVENOUS; SUBCUTANEOUS AS NEEDED
Status: DISCONTINUED | OUTPATIENT
Start: 2021-07-22 | End: 2021-07-22 | Stop reason: HOSPADM

## 2021-07-22 RX ORDER — HEPARIN SODIUM 5000 [USP'U]/ML
INJECTION, SOLUTION INTRAVENOUS; SUBCUTANEOUS
Status: COMPLETED | OUTPATIENT
Start: 2021-07-22 | End: 2021-07-22

## 2021-07-22 RX ORDER — CARVEDILOL 3.12 MG/1
3.12 TABLET ORAL 2 TIMES DAILY WITH MEALS
Status: DISCONTINUED | OUTPATIENT
Start: 2021-07-22 | End: 2021-07-24 | Stop reason: HOSPADM

## 2021-07-22 RX ORDER — ATORVASTATIN CALCIUM 20 MG/1
40 TABLET, FILM COATED ORAL NIGHTLY
Status: DISCONTINUED | OUTPATIENT
Start: 2021-07-22 | End: 2021-07-24 | Stop reason: HOSPADM

## 2021-07-22 RX ORDER — ONDANSETRON 2 MG/ML
4 INJECTION INTRAMUSCULAR; INTRAVENOUS EVERY 6 HOURS PRN
Status: DISCONTINUED | OUTPATIENT
Start: 2021-07-22 | End: 2021-07-24 | Stop reason: HOSPADM

## 2021-07-22 RX ORDER — MIDAZOLAM HYDROCHLORIDE 1 MG/ML
INJECTION INTRAMUSCULAR; INTRAVENOUS AS NEEDED
Status: DISCONTINUED | OUTPATIENT
Start: 2021-07-22 | End: 2021-07-22 | Stop reason: HOSPADM

## 2021-07-22 RX ORDER — NICOTINE 21 MG/24HR
1 PATCH, TRANSDERMAL 24 HOURS TRANSDERMAL EVERY 24 HOURS
Status: DISCONTINUED | OUTPATIENT
Start: 2021-07-22 | End: 2021-07-24 | Stop reason: HOSPADM

## 2021-07-22 RX ORDER — ASPIRIN 81 MG/1
81 TABLET ORAL DAILY
Status: DISCONTINUED | OUTPATIENT
Start: 2021-07-22 | End: 2021-07-24 | Stop reason: HOSPADM

## 2021-07-22 RX ORDER — LIDOCAINE HYDROCHLORIDE 20 MG/ML
INJECTION, SOLUTION INFILTRATION; PERINEURAL AS NEEDED
Status: DISCONTINUED | OUTPATIENT
Start: 2021-07-22 | End: 2021-07-22 | Stop reason: HOSPADM

## 2021-07-22 RX ORDER — SODIUM CHLORIDE 9 MG/ML
50 INJECTION, SOLUTION INTRAVENOUS CONTINUOUS
Status: ACTIVE | OUTPATIENT
Start: 2021-07-22 | End: 2021-07-23

## 2021-07-22 RX ORDER — CLOPIDOGREL BISULFATE 75 MG/1
TABLET ORAL
Status: COMPLETED | OUTPATIENT
Start: 2021-07-22 | End: 2021-07-22

## 2021-07-22 RX ORDER — ACETAMINOPHEN 325 MG/1
650 TABLET ORAL EVERY 4 HOURS PRN
Status: DISCONTINUED | OUTPATIENT
Start: 2021-07-22 | End: 2021-07-24 | Stop reason: HOSPADM

## 2021-07-22 RX ORDER — FENTANYL CITRATE 50 UG/ML
INJECTION, SOLUTION INTRAMUSCULAR; INTRAVENOUS AS NEEDED
Status: DISCONTINUED | OUTPATIENT
Start: 2021-07-22 | End: 2021-07-22 | Stop reason: HOSPADM

## 2021-07-22 RX ORDER — CLOPIDOGREL BISULFATE 75 MG/1
75 TABLET ORAL DAILY
Status: DISCONTINUED | OUTPATIENT
Start: 2021-07-23 | End: 2021-07-24 | Stop reason: HOSPADM

## 2021-07-22 RX ORDER — METOPROLOL TARTRATE 5 MG/5ML
INJECTION INTRAVENOUS AS NEEDED
Status: DISCONTINUED | OUTPATIENT
Start: 2021-07-22 | End: 2021-07-22 | Stop reason: HOSPADM

## 2021-07-22 RX ORDER — HYDROCODONE BITARTRATE AND ACETAMINOPHEN 5; 325 MG/1; MG/1
1 TABLET ORAL EVERY 4 HOURS PRN
Status: DISCONTINUED | OUTPATIENT
Start: 2021-07-22 | End: 2021-07-24 | Stop reason: HOSPADM

## 2021-07-22 RX ADMIN — SODIUM CHLORIDE 50 ML/HR: 9 INJECTION, SOLUTION INTRAVENOUS at 20:27

## 2021-07-22 RX ADMIN — HEPARIN SODIUM 4000 UNITS: 5000 INJECTION INTRAVENOUS; SUBCUTANEOUS at 17:32

## 2021-07-22 RX ADMIN — CLOPIDOGREL 600 MG: 75 TABLET, FILM COATED ORAL at 17:31

## 2021-07-22 RX ADMIN — ATORVASTATIN CALCIUM 40 MG: 20 TABLET, FILM COATED ORAL at 20:22

## 2021-07-22 RX ADMIN — CARVEDILOL 3.12 MG: 3.12 TABLET, FILM COATED ORAL at 21:26

## 2021-07-22 NOTE — ED TRIAGE NOTES
Pt to ED from home via [facility] with c/o CP x 4 days. ST elevation noted by EMS en route. ASA 324mg and x2 nitro given.

## 2021-07-22 NOTE — PLAN OF CARE
Goal Outcome Evaluation:  Plan of Care Reviewed With: patient        Progress: improving        Patient admitted from cath lab this evening following chest pain and heart catheterization. Right femoral approach with angioseal. Site CDI. No further c/o chest pain or shortness of air. Vitals stable. Continue to monitor.

## 2021-07-22 NOTE — ED PROVIDER NOTES
EMERGENCY DEPARTMENT ENCOUNTER    Room Number:  KAZ/KAZ  Date of encounter:  7/22/2021  PCP: Raymond Simon MD (Tony)  Historian: Patient and EMS      HPI:  Chief Complaint: Chest pain  A complete HPI/ROS/PMH/PSH/SH/FH are unobtainable due to: Acuity    Context: Tonya Mccall is a 71 y.o. female who presents to the ED c/o chest pain for the past 3 to 4 days.  It became severe this afternoon, approximately 2 to 3 hours ago.  She describes it as aching, it is located in the center of her chest and radiates to her shoulders and arms bilaterally as well as into her neck and jaw.  She has had nausea and diaphoresis but does not typically feel short of breath.  Symptoms were improved in route with sublingual nitroglycerin but have since recurred.  She has a history of coronary disease with prior stent placed.  No recent fevers or chills.  No cough or congestion.  No lower extremity swelling.      The patient was placed in a mask in triage, hand hygiene was performed before and after my interaction with the patient.  I wore a mask, safety glasses and gloves during my entire interaction with the patient.    PAST MEDICAL HISTORY  Active Ambulatory Problems     Diagnosis Date Noted   • Trichomonas vaginitis 03/05/2017   • Non-STEMI (non-ST elevated myocardial infarction) (CMS/[facility]) 04/22/2018   • Ischemic cardiomyopathy 04/23/2018   • Mixed hyperlipidemia 04/23/2018   • Type 2 diabetes mellitus with circulatory disorder (CMS/[facility]) 04/23/2018   • Tobacco abuse 04/23/2018   • Coronary artery disease involving native coronary artery of native heart with angina pectoris (CMS/[facility]) 05/07/2018   • History of coronary artery stent placement 05/07/2018     Resolved Ambulatory Problems     Diagnosis Date Noted   • No Resolved Ambulatory Problems     Past Medical History:   Diagnosis Date   • Atelectasis    • Breast nodule    • CAD (coronary artery disease)    • COPD (chronic obstructive pulmonary disease) (CMS/[facility])    •  Dyspareunia in female    • Elevated cholesterol    • GERD (gastroesophageal reflux disease)    • Herpes genitalis    • Hiatal hernia    • High cholesterol    • History of Papanicolaou smear of cervix 2013   • Hormone replacement therapy (postmenopausal)    • HPV in female    • Hyperlipidemia    • Hypertension    • Hypertriglyceridemia    • LV dysfunction    • Ovarian cystadenoma    • Pulmonary vascular congestion    • Senile (atrophic) vaginitis    • Soft tissue tumor    • Trichomonal vaginitis    • Uterine leiomyoma          PAST SURGICAL HISTORY  Past Surgical History:   Procedure Laterality Date   • APPENDECTOMY     • CARDIAC CATHETERIZATION N/A 4/22/2018    Procedure: Left Heart Cath and Cors;  Surgeon: Srikanth Rod MD;  Location:  [facility];  Service: Cardiovascular   • CARDIAC CATHETERIZATION N/A 4/22/2018    Procedure: Peripheral angiography;  Surgeon: Srikanth Rod MD;  Location:  [facility];  Service: Cardiovascular   • CARDIAC CATHETERIZATION N/A 4/22/2018    Procedure: Stent DAO coronary;  Surgeon: Srikanth Rod MD;  Location:  [facility];  Service: Cardiovascular   • DILATATION AND CURETTAGE     • ESSURE TUBAL LIGATION     • TONSILLECTOMY     • TOTAL ABDOMINAL HYSTERECTOMY Bilateral 2006    ASHLEE, BSO. Pathology showed serous cystadenoma/cystadenofibroma , bilaterally of both tubes with focal paratubal cyst. Severe Acute and Chronic Cervicitis and endocervicitis with reactive chage and focal features suggestive of HPV cystopathic effect. Disordered proliferative phase endometrium. Adenomyosis and Leiomyomas.          FAMILY HISTORY  Family History   Problem Relation Age of Onset   • Stroke Mother         Cerebral Infarction    • Diabetes type II Mother         Mellitus    • Melanoma Father         Malignant    • Chronic granulomatous disease Brother         Wegener's Granolomatosis    • Diabetes type I Brother    • Breast cancer Maternal Aunt 60    • No Known Problems Maternal Grandmother    • No Known Problems Paternal Grandmother    • No Known Problems Paternal Aunt    • Cancer Maternal Grandfather    • No Known Problems Paternal Grandfather    • BRCA 1/2 Neg Hx    • Colon cancer Neg Hx    • Endometrial cancer Neg Hx    • Ovarian cancer Neg Hx          SOCIAL HISTORY  Social History     Socioeconomic History   • Marital status: Significant Other     Spouse name: GRANT MARIN   • Number of children: 0   • Years of education: Not on file   • Highest education level: Not on file   Tobacco Use   • Smoking status: Current Every Day Smoker     Packs/day: 1.00     Types: Cigarettes     Last attempt to quit: 4/21/2018     Years since quitting: 3.2   • Smokeless tobacco: Never Used   Substance and Sexual Activity   • Alcohol use: No   • Drug use: No   • Sexual activity: Yes     Partners: Male         ALLERGIES  Clindamycin/lincomycin and Flagyl [metronidazole]        REVIEW OF SYSTEMS  Review of Systems   Unable to perform ROS: Acuity of condition   Constitutional: Negative for chills and fever.   Cardiovascular: Positive for chest pain. Negative for leg swelling.   Gastrointestinal: Positive for nausea.        All systems reviewed and negative except for those discussed in HPI.       PHYSICAL EXAM    I have reviewed the triage vital signs and nursing notes.    ED Triage Vitals   Temp Heart Rate Resp BP SpO2   07/22/21 1733 07/22/21 1730 07/22/21 1733 07/22/21 1731 07/22/21 1731   98.9 °F (37.2 °C) 88 18 (!) 150/103 98 %      Temp src Heart Rate Source Patient Position BP Location FiO2 (%)   07/22/21 1733 -- -- -- --   Oral           Physical Exam   Constitutional: Pt. is oriented to person, place, and time and well-developed, well-nourished, and in mild distress.   HENT: Normocephalic and atraumatic. Oropharynx moist/nonerythematous.  Neck: Normal range of motion. Neck supple. No JVD present.   Cardiovascular: Normal rate, regular rhythm and normal heart sounds.  Exam reveals no gallop and no friction rub.   No murmur heard.  Pulmonary/Chest: Effort normal and breath sounds normal. No stridor. No respiratory distress. No wheezes, no rales.   Abdominal: Soft. Bowel sounds are normal. No distension. There is no tenderness. There is no rebound and no guarding.   Musculoskeletal: Normal range of motion. No edema, tenderness or deformity.   Neurological: Pt. is alert and oriented to person, place, and time.  She has no focal neurologic deficits  Skin: Skin is warm and dry. No rash noted. Pt. is not diaphoretic. No erythema.   Psychiatric: Mood is anxious..  Nursing note and vitals reviewed.        LAB RESULTS  No results found for this or any previous visit (from the past 24 hour(s)).    Ordered the above labs and independently reviewed the results.        RADIOLOGY  No Radiology Exams Resulted Within Past 24 Hours    I ordered the above noted radiological studies. Reviewed by me and discussed with radiologist.  See dictation for official radiology interpretation.      PROCEDURES    Procedures      MEDICATIONS GIVEN IN ER    Medications   clopidogrel (PLAVIX) tablet (600 mg Oral Given 7/22/21 1731)   heparin (porcine) 5000 UNIT/ML injection (4,000 Units Intravenous Given 7/22/21 1732)         PROGRESS, DATA ANALYSIS, CONSULTS, AND MEDICAL DECISION MAKING    Any/all labs have been independently reviewed by me.  Any/all radiology studies have been reviewed by me and discussed with radiologist dictating the report.   EKG's independently viewed and interpreted by me.  Discussion below represents my analysis of pertinent findings related to patient's condition, differential diagnosis, treatment plan and final disposition.      ED Course as of Jul 22 1736   Thu Jul 22, 2021   1735 Heparin bolus and Plavix load given.  Patient had aspirin in route.  Dr. Rod at bedside-he will take the patient to the Cath Lab.    [WC]      ED Course User Index  [WC] Srikanth Beal MD       AS OF  17:36 EDT VITALS:    BP - (!) 150/103  HR - 88  TEMP - 98.9 °F (37.2 °C) (Oral)  02 SATS - 98%        DIAGNOSIS  Final diagnoses:   ST elevation myocardial infarction (STEMI), unspecified artery (CMS/HCC)         DISPOSITION  Cath Lab           Srikanth Beal MD  07/22/21 1641

## 2021-07-22 NOTE — H&P
Date of Hospital Visit: 21  Encounter Provider: Srikanth Rod MD  Place of Service: [facility] CARDIOLOGY  Patient Name: Tonya Mccall  :1950  [id number]    Chief complaint: Chest pain    History of Present Illness: 71-year-old lady with a history of known coronary disease hyperlipidemia diabetes and ongoing tobacco abuse who has had off-and-on chest pain for the last 4 days but then really started to have severe chest pain about 2 hours ago she called 911 and has been diagnosed with an anterior lateral STEMI.  She is not had any bleeding difficulty.  No history of lung or kidney disease no PND orthopnea edema.    Past Medical History:   Diagnosis Date   • Atelectasis     right   • Breast nodule    • CAD (coronary artery disease)    • COPD (chronic obstructive pulmonary disease) (CMS/[facility])    • Dyspareunia in female    • Elevated cholesterol    • GERD (gastroesophageal reflux disease)    • Herpes genitalis    • Hiatal hernia    • High cholesterol    • History of coronary artery stent placement    • History of Papanicolaou smear of cervix 2013    Pap smears hve all been normal since .  They frequently have shown Trichomonas infections to be present.  2013, normal Pap smear, negative HR-HPV.  Next pap 2016.    • Hormone replacement therapy (postmenopausal)    • HPV in female    • Hyperlipidemia    • Hypertension    • Hypertriglyceridemia    • Ischemic cardiomyopathy    • LV dysfunction     with ejection fraction 30%   • Non-STEMI (non-ST elevated myocardial infarction) (CMS/[facility])    • Ovarian cystadenoma    • Pulmonary vascular congestion    • Senile (atrophic) vaginitis    • Soft tissue tumor     NECK   • Tobacco abuse    • Trichomonal vaginitis    • Type 2 diabetes mellitus with circulatory disorder (CMS/[facility]) 2018   • Uterine leiomyoma        Past Surgical History:   Procedure Laterality Date   • APPENDECTOMY     • CARDIAC CATHETERIZATION N/A 2018    Procedure:  Left Heart Cath and Cors;  Surgeon: Srikanth Rod MD;  Location:  [facility];  Service: Cardiovascular   • CARDIAC CATHETERIZATION N/A 4/22/2018    Procedure: Peripheral angiography;  Surgeon: Srikanth Rod MD;  Location:  [facility];  Service: Cardiovascular   • CARDIAC CATHETERIZATION N/A 4/22/2018    Procedure: Stent DAO coronary;  Surgeon: Srikanth Rod MD;  Location:  [facility];  Service: Cardiovascular   • DILATATION AND CURETTAGE     • ESSURE TUBAL LIGATION     • TONSILLECTOMY     • TOTAL ABDOMINAL HYSTERECTOMY Bilateral 2006    ASHLEE, BSO. Pathology showed serous cystadenoma/cystadenofibroma , bilaterally of both tubes with focal paratubal cyst. Severe Acute and Chronic Cervicitis and endocervicitis with reactive chage and focal features suggestive of HPV cystopathic effect. Disordered proliferative phase endometrium. Adenomyosis and Leiomyomas.        Medications Prior to Admission   Medication Sig Dispense Refill Last Dose   • aspirin 81 MG tablet Take 81 mg by mouth Daily.      • carvedilol (COREG) 3.125 MG tablet TAKE ONE TABLET BY MOUTH TWICE A DAY WITH MEALS 180 tablet 0    • metFORMIN (GLUCOPHAGE) 500 MG tablet TAKE ONE TABLET BY MOUTH TWICE A  tablet 0    • pravastatin (PRAVACHOL) 40 MG tablet Take 40 mg by mouth Daily.          Current Meds  No current facility-administered medications on file prior to encounter.     Current Outpatient Medications on File Prior to Encounter   Medication Sig Dispense Refill   • aspirin 81 MG tablet Take 81 mg by mouth Daily.     • carvedilol (COREG) 3.125 MG tablet TAKE ONE TABLET BY MOUTH TWICE A DAY WITH MEALS 180 tablet 0   • metFORMIN (GLUCOPHAGE) 500 MG tablet TAKE ONE TABLET BY MOUTH TWICE A  tablet 0   • pravastatin (PRAVACHOL) 40 MG tablet Take 40 mg by mouth Daily.         Social History     Socioeconomic History   • Marital status: Significant Other     Spouse name: GRANT MARIN   •  Number of children: 0   • Years of education: Not on file   • Highest education level: Not on file   Tobacco Use   • Smoking status: Current Every Day Smoker     Packs/day: 1.00     Types: Cigarettes     Last attempt to quit: 4/21/2018     Years since quitting: 3.2   • Smokeless tobacco: Never Used   Substance and Sexual Activity   • Alcohol use: No   • Drug use: No   • Sexual activity: Yes     Partners: Male       Family Hx: Non-contributory    REVIEW OF SYSTEMS:   ROS was performed and is negative except as outlined in HPI     REVIEW OF SYSTEMS:   CONSTITUTIONAL: No weight loss, fever, chills, weakness or fatigue.   HEENT: Eyes: No visual loss, blurred vision, double vision or yellow sclerae. Ears, Nose, Throat: No hearing loss, sneezing, congestion, runny nose or sore throat.   SKIN: No rash or itching.     RESPIRATORY: No shortness of breath, hemoptysis, cough or sputum.   GASTROINTESTINAL: No anorexia, nausea, vomiting or diarrhea. No abdominal pain, bright red blood per rectum or melena.  NEUROLOGICAL: No headache, dizziness, syncope, paralysis, numbness or tingling in the extremities.  MUSCULOSKELETAL: No muscle, back pain, joint pain or stiffness.   HEMATOLOGIC: No anemia, bleeding or bruising.   LYMPHATICS: No enlarged nodes.  PSYCHIATRIC: No history of depression, anxiety, hallucinations.   ENDOCRINOLOGIC: No reports of sweating, cold or heat intolerance. No polyuria or polydipsia.        Objective:     Vitals:    07/22/21 1800 07/22/21 1805 07/22/21 1810 07/22/21 1815   BP:       Pulse:       Resp: 20 20 18 20   Temp:       TempSrc:       SpO2:       Weight:         Body mass index is 26.85 kg/m².  Flowsheet Rows      First Filed Value   Admission Height  --   Admission Weight  73.2 kg (161 lb 5.3 oz) Documented at 07/22/2021 1751          General Appearance:    Alert, oriented x 3, in no acute distress   Head:    Normocephalic, without obvious abnormality, atraumatic   Ears:    Ears appear intact with no  abnormalities noted   Throat:   No oral lesions, dentition good   Neck:   No adenopathy, supple, trachea midline, no thyromegaly, no carotid bruit, no JVD   Lungs:    Breath sounds are equal and  clear to auscultation    Heart:   Normal S1 and S2, RRR, no murmur/gallop or rub   Abdomen:    Normal bowel sounds, obese, soft non-tender, non-distended, no organomegaly, no guarding   Extremities:   Moves all extremities well, no edema, no cyanosis, no redness   Pulses:   Pulses palpable and equal bilaterally. Normal radial pulses   Skin:   No bleeding, bruising or rash   Lymph nodes:   No palpable adenopathy     I personally viewed and interpreted the patient's EKG/Telemetry data    Assessment:  There are no hospital problems to display for this patient.      Plan: Stuttering anterior lateral STEMI.  We will get a take him directly to the Cath Lab further decisions will be based on the findings at the time of that this is obviously a life-threatening situation the sad thing is is that she has already had a stent to her LAD this is likely in her circumflex which means that the coronary disease has not stopped she has to make some lifestyle modification

## 2021-07-23 ENCOUNTER — APPOINTMENT (OUTPATIENT)
Dept: CARDIOLOGY | Facility: HOSPITAL | Age: 71
End: 2021-07-23

## 2021-07-23 PROBLEM — I21.3 ST ELEVATION MYOCARDIAL INFARCTION (STEMI): Status: ACTIVE | Noted: 2021-07-23

## 2021-07-23 LAB
ACT BLD: 213 SECONDS (ref 82–152)
ACT BLD: 340 SECONDS (ref 82–152)
ANION GAP SERPL CALCULATED.3IONS-SCNC: 11 MMOL/L (ref 5–15)
AORTIC DIMENSIONLESS INDEX: 0.6 (DI)
ASCENDING AORTA: 3.1 CM
BH CV ECHO MEAS - ACS: 1.7 CM
BH CV ECHO MEAS - AO MAX PG (FULL): 5.6 MMHG
BH CV ECHO MEAS - AO MAX PG: 8.3 MMHG
BH CV ECHO MEAS - AO MEAN PG (FULL): 4 MMHG
BH CV ECHO MEAS - AO MEAN PG: 5 MMHG
BH CV ECHO MEAS - AO ROOT AREA (BSA CORRECTED): 1.6
BH CV ECHO MEAS - AO ROOT AREA: 7.1 CM^2
BH CV ECHO MEAS - AO ROOT DIAM: 3 CM
BH CV ECHO MEAS - AO V2 MAX: 144 CM/SEC
BH CV ECHO MEAS - AO V2 MEAN: 103 CM/SEC
BH CV ECHO MEAS - AO V2 VTI: 29 CM
BH CV ECHO MEAS - ASC AORTA: 3.1 CM
BH CV ECHO MEAS - AVA(I,A): 1.6 CM^2
BH CV ECHO MEAS - AVA(I,D): 1.6 CM^2
BH CV ECHO MEAS - AVA(V,A): 1.6 CM^2
BH CV ECHO MEAS - AVA(V,D): 1.6 CM^2
BH CV ECHO MEAS - BSA(HAYCOCK): 1.9 M^2
BH CV ECHO MEAS - BSA: 1.8 M^2
BH CV ECHO MEAS - BZI_BMI: 27.8 KILOGRAMS/M^2
BH CV ECHO MEAS - BZI_METRIC_HEIGHT: 165.1 CM
BH CV ECHO MEAS - BZI_METRIC_WEIGHT: 75.8 KG
BH CV ECHO MEAS - CONTRAST EF 4CH: 42 CM2
BH CV ECHO MEAS - EDV(CUBED): 132.7 ML
BH CV ECHO MEAS - EDV(MOD-SP2): 91 ML
BH CV ECHO MEAS - EDV(MOD-SP4): 103 ML
BH CV ECHO MEAS - EDV(TEICH): 123.8 ML
BH CV ECHO MEAS - EF(CUBED): 67.7 %
BH CV ECHO MEAS - EF(MOD-BP): 42.2 %
BH CV ECHO MEAS - EF(MOD-SP2): 46.2 %
BH CV ECHO MEAS - EF(MOD-SP4): 36.9 %
BH CV ECHO MEAS - EF(TEICH): 58.9 %
BH CV ECHO MEAS - ESV(CUBED): 42.9 ML
BH CV ECHO MEAS - ESV(MOD-SP2): 49 ML
BH CV ECHO MEAS - ESV(MOD-SP4): 65 ML
BH CV ECHO MEAS - ESV(TEICH): 50.9 ML
BH CV ECHO MEAS - FS: 31.4 %
BH CV ECHO MEAS - IVS/LVPW: 1.2
BH CV ECHO MEAS - IVSD: 1.1 CM
BH CV ECHO MEAS - LAT PEAK E' VEL: 3.1 CM/SEC
BH CV ECHO MEAS - LV DIASTOLIC VOL/BSA (35-75): 56.2 ML/M^2
BH CV ECHO MEAS - LV MASS(C)D: 194.4 GRAMS
BH CV ECHO MEAS - LV MASS(C)DI: 106.1 GRAMS/M^2
BH CV ECHO MEAS - LV MAX PG: 2.7 MMHG
BH CV ECHO MEAS - LV MEAN PG: 1 MMHG
BH CV ECHO MEAS - LV SYSTOLIC VOL/BSA (12-30): 35.5 ML/M^2
BH CV ECHO MEAS - LV V1 MAX: 81.6 CM/SEC
BH CV ECHO MEAS - LV V1 MEAN: 50.7 CM/SEC
BH CV ECHO MEAS - LV V1 VTI: 16.2 CM
BH CV ECHO MEAS - LVIDD: 5.1 CM
BH CV ECHO MEAS - LVIDS: 3.5 CM
BH CV ECHO MEAS - LVLD AP2: 7.7 CM
BH CV ECHO MEAS - LVLD AP4: 8.1 CM
BH CV ECHO MEAS - LVLS AP2: 6.3 CM
BH CV ECHO MEAS - LVLS AP4: 6.7 CM
BH CV ECHO MEAS - LVOT AREA (M): 2.8 CM^2
BH CV ECHO MEAS - LVOT AREA: 2.8 CM^2
BH CV ECHO MEAS - LVOT DIAM: 1.9 CM
BH CV ECHO MEAS - LVPWD: 0.95 CM
BH CV ECHO MEAS - MED PEAK E' VEL: 4.1 CM/SEC
BH CV ECHO MEAS - MV A DUR: 0.15 SEC
BH CV ECHO MEAS - MV A MAX VEL: 114 CM/SEC
BH CV ECHO MEAS - MV DEC SLOPE: 269 CM/SEC^2
BH CV ECHO MEAS - MV DEC TIME: 280 SEC
BH CV ECHO MEAS - MV E MAX VEL: 60.4 CM/SEC
BH CV ECHO MEAS - MV E/A: 0.53
BH CV ECHO MEAS - MV MAX PG: 6.4 MMHG
BH CV ECHO MEAS - MV MEAN PG: 3 MMHG
BH CV ECHO MEAS - MV P1/2T MAX VEL: 82.5 CM/SEC
BH CV ECHO MEAS - MV P1/2T: 89.8 MSEC
BH CV ECHO MEAS - MV V2 MAX: 126 CM/SEC
BH CV ECHO MEAS - MV V2 MEAN: 75 CM/SEC
BH CV ECHO MEAS - MV V2 VTI: 24.7 CM
BH CV ECHO MEAS - MVA P1/2T LCG: 2.7 CM^2
BH CV ECHO MEAS - MVA(P1/2T): 2.4 CM^2
BH CV ECHO MEAS - MVA(VTI): 1.9 CM^2
BH CV ECHO MEAS - PA ACC TIME: 0.06 SEC
BH CV ECHO MEAS - PA MAX PG (FULL): 1.3 MMHG
BH CV ECHO MEAS - PA MAX PG: 3.1 MMHG
BH CV ECHO MEAS - PA PR(ACCEL): 52.9 MMHG
BH CV ECHO MEAS - PA V2 MAX: 88.2 CM/SEC
BH CV ECHO MEAS - PI END-D VEL: 99.9 CM/SEC
BH CV ECHO MEAS - PULM A REVS DUR: 0.15 SEC
BH CV ECHO MEAS - PULM A REVS VEL: 34 CM/SEC
BH CV ECHO MEAS - PULM DIAS VEL: 29.4 CM/SEC
BH CV ECHO MEAS - PULM S/D: 2.3
BH CV ECHO MEAS - PULM SYS VEL: 68.9 CM/SEC
BH CV ECHO MEAS - PVA(V,A): 2.4 CM^2
BH CV ECHO MEAS - PVA(V,D): 2.4 CM^2
BH CV ECHO MEAS - QP/QS: 0.81
BH CV ECHO MEAS - RAP SYSTOLE: 3 MMHG
BH CV ECHO MEAS - RV MAX PG: 1.8 MMHG
BH CV ECHO MEAS - RV MEAN PG: 1 MMHG
BH CV ECHO MEAS - RV V1 MAX: 66.6 CM/SEC
BH CV ECHO MEAS - RV V1 MEAN: 41.1 CM/SEC
BH CV ECHO MEAS - RV V1 VTI: 11.8 CM
BH CV ECHO MEAS - RVOT AREA: 3.1 CM^2
BH CV ECHO MEAS - RVOT DIAM: 2 CM
BH CV ECHO MEAS - SI(AO): 111.9 ML/M^2
BH CV ECHO MEAS - SI(CUBED): 49 ML/M^2
BH CV ECHO MEAS - SI(LVOT): 25.1 ML/M^2
BH CV ECHO MEAS - SI(MOD-SP2): 22.9 ML/M^2
BH CV ECHO MEAS - SI(MOD-SP4): 20.7 ML/M^2
BH CV ECHO MEAS - SI(TEICH): 39.8 ML/M^2
BH CV ECHO MEAS - SV(AO): 205 ML
BH CV ECHO MEAS - SV(CUBED): 89.8 ML
BH CV ECHO MEAS - SV(LVOT): 45.9 ML
BH CV ECHO MEAS - SV(MOD-SP2): 42 ML
BH CV ECHO MEAS - SV(MOD-SP4): 38 ML
BH CV ECHO MEAS - SV(RVOT): 37.1 ML
BH CV ECHO MEAS - SV(TEICH): 72.9 ML
BH CV ECHO MEAS - TAPSE (>1.6): 1.4 CM
BH CV ECHO MEASUREMENTS AVERAGE E/E' RATIO: 16.78
BH CV VAS BP LEFT ARM: NORMAL MMHG
BH CV XLRA - RV BASE: 3.2 CM
BH CV XLRA - RV LENGTH: 5.6 CM
BH CV XLRA - RV MID: 1.9 CM
BH CV XLRA - TDI S': 12.7 CM/SEC
BUN SERPL-MCNC: 7 MG/DL (ref 8–23)
BUN/CREAT SERPL: 10.3 (ref 7–25)
CALCIUM SPEC-SCNC: 8.9 MG/DL (ref 8.6–10.5)
CHLORIDE SERPL-SCNC: 105 MMOL/L (ref 98–107)
CHOLEST SERPL-MCNC: 149 MG/DL (ref 0–200)
CO2 SERPL-SCNC: 24 MMOL/L (ref 22–29)
CREAT SERPL-MCNC: 0.68 MG/DL (ref 0.57–1)
DEPRECATED RDW RBC AUTO: 46.6 FL (ref 37–54)
ERYTHROCYTE [DISTWIDTH] IN BLOOD BY AUTOMATED COUNT: 12.8 % (ref 12.3–15.4)
GFR SERPL CREATININE-BSD FRML MDRD: 85 ML/MIN/1.73
GLUCOSE BLDC GLUCOMTR-MCNC: 169 MG/DL (ref 70–130)
GLUCOSE BLDC GLUCOMTR-MCNC: 206 MG/DL (ref 70–130)
GLUCOSE SERPL-MCNC: 134 MG/DL (ref 65–99)
HBA1C MFR BLD: 6 % (ref 4.8–5.6)
HCT VFR BLD AUTO: 44.9 % (ref 34–46.6)
HDLC SERPL-MCNC: 33 MG/DL (ref 40–60)
HGB BLD-MCNC: 14.2 G/DL (ref 12–15.9)
LDLC SERPL CALC-MCNC: 75 MG/DL (ref 0–100)
LDLC/HDLC SERPL: 2.02 {RATIO}
LEFT ATRIUM VOLUME INDEX: 22.4 ML/M2
MAXIMAL PREDICTED HEART RATE: 148 BPM
MCH RBC QN AUTO: 30.7 PG (ref 26.6–33)
MCHC RBC AUTO-ENTMCNC: 31.6 G/DL (ref 31.5–35.7)
MCV RBC AUTO: 97 FL (ref 79–97)
PLATELET # BLD AUTO: 167 10*3/MM3 (ref 140–450)
PMV BLD AUTO: 10.4 FL (ref 6–12)
POTASSIUM SERPL-SCNC: 3.5 MMOL/L (ref 3.5–5.2)
QT INTERVAL: 419 MS
QT INTERVAL: 459 MS
RBC # BLD AUTO: 4.63 10*6/MM3 (ref 3.77–5.28)
SINUS: 2.9 CM
SODIUM SERPL-SCNC: 140 MMOL/L (ref 136–145)
STJ: 2.7 CM
STRESS TARGET HR: 126 BPM
TRIGL SERPL-MCNC: 247 MG/DL (ref 0–150)
TROPONIN T SERPL-MCNC: 1.75 NG/ML (ref 0–0.03)
VLDLC SERPL-MCNC: 41 MG/DL (ref 5–40)
WBC # BLD AUTO: 11.37 10*3/MM3 (ref 3.4–10.8)

## 2021-07-23 PROCEDURE — 84484 ASSAY OF TROPONIN QUANT: CPT | Performed by: INTERNAL MEDICINE

## 2021-07-23 PROCEDURE — 99232 SBSQ HOSP IP/OBS MODERATE 35: CPT | Performed by: INTERNAL MEDICINE

## 2021-07-23 PROCEDURE — 25010000002 PERFLUTREN (DEFINITY) 8.476 MG IN SODIUM CHLORIDE (PF) 0.9 % 10 ML INJECTION: Performed by: INTERNAL MEDICINE

## 2021-07-23 PROCEDURE — 82962 GLUCOSE BLOOD TEST: CPT

## 2021-07-23 PROCEDURE — 93306 TTE W/DOPPLER COMPLETE: CPT

## 2021-07-23 PROCEDURE — 93306 TTE W/DOPPLER COMPLETE: CPT | Performed by: INTERNAL MEDICINE

## 2021-07-23 PROCEDURE — 80048 BASIC METABOLIC PNL TOTAL CA: CPT | Performed by: INTERNAL MEDICINE

## 2021-07-23 PROCEDURE — 85027 COMPLETE CBC AUTOMATED: CPT | Performed by: INTERNAL MEDICINE

## 2021-07-23 PROCEDURE — 83036 HEMOGLOBIN GLYCOSYLATED A1C: CPT | Performed by: INTERNAL MEDICINE

## 2021-07-23 PROCEDURE — 80061 LIPID PANEL: CPT | Performed by: INTERNAL MEDICINE

## 2021-07-23 PROCEDURE — 93005 ELECTROCARDIOGRAM TRACING: CPT | Performed by: INTERNAL MEDICINE

## 2021-07-23 PROCEDURE — 93010 ELECTROCARDIOGRAM REPORT: CPT | Performed by: INTERNAL MEDICINE

## 2021-07-23 RX ORDER — MORPHINE SULFATE 2 MG/ML
2 INJECTION, SOLUTION INTRAMUSCULAR; INTRAVENOUS
Status: DISCONTINUED | OUTPATIENT
Start: 2021-07-23 | End: 2021-07-24 | Stop reason: HOSPADM

## 2021-07-23 RX ORDER — NITROGLYCERIN 0.4 MG/1
0.4 TABLET SUBLINGUAL
Status: DISCONTINUED | OUTPATIENT
Start: 2021-07-23 | End: 2021-07-24 | Stop reason: HOSPADM

## 2021-07-23 RX ORDER — LISINOPRIL 2.5 MG/1
2.5 TABLET ORAL
Status: DISCONTINUED | OUTPATIENT
Start: 2021-07-23 | End: 2021-07-24 | Stop reason: HOSPADM

## 2021-07-23 RX ADMIN — PERFLUTREN 1 ML: 6.52 INJECTION, SUSPENSION INTRAVENOUS at 11:46

## 2021-07-23 RX ADMIN — CARVEDILOL 3.12 MG: 3.12 TABLET, FILM COATED ORAL at 08:19

## 2021-07-23 RX ADMIN — EMPAGLIFLOZIN 10 MG: 10 TABLET, FILM COATED ORAL at 14:19

## 2021-07-23 RX ADMIN — ASPIRIN 81 MG: 81 TABLET, COATED ORAL at 08:19

## 2021-07-23 RX ADMIN — HYDROCODONE BITARTRATE AND ACETAMINOPHEN 1 TABLET: 5; 325 TABLET ORAL at 04:16

## 2021-07-23 RX ADMIN — ATORVASTATIN CALCIUM 40 MG: 20 TABLET, FILM COATED ORAL at 20:55

## 2021-07-23 RX ADMIN — LISINOPRIL 2.5 MG: 2.5 TABLET ORAL at 14:19

## 2021-07-23 RX ADMIN — CLOPIDOGREL 75 MG: 75 TABLET, FILM COATED ORAL at 08:19

## 2021-07-23 RX ADMIN — CARVEDILOL 3.12 MG: 3.12 TABLET, FILM COATED ORAL at 18:54

## 2021-07-23 NOTE — NURSING NOTE
"Patient remains in CCU. Began to c/o chest pain around 3am. Patient states, \"The pain is as bad as it was when I came to the hospital yesterday.\" STAT EKG done, morning labs drawn & patient given norco for chest pain. One time order for nitropaste per Dr. Bansal----Nitropaste held due to patient no longer having chest pain. Adequate UOP tonight, 1BM. On room air. Alert & oriented x4. CTM.  "

## 2021-07-23 NOTE — PROGRESS NOTES
Tonya Mccall  1950 71 y.o.  [id number]      Patient Care Team:  Raymond Simon MD (Tony) as PCP - General (Internal Medicine)  Raymond Simon MD (Tony) as Consulting Physician (Internal Medicine)    CC: Lateral STEMI treated with PCI, LV function is preserved diabetes tobacco abuse    Interval History: Doing well overnight      Objective   Vital Signs  Temp:  [97.8 °F (36.6 °C)-98.9 °F (37.2 °C)] 98.6 °F (37 °C)  Heart Rate:  [75-89] 89  Resp:  [10-20] 19  BP: (112-157)/() 147/82  No intake or output data in the 24 hours ending 07/23/21 1055  Flowsheet Rows      First Filed Value   Admission Height  --   Admission Weight  73.2 kg (161 lb 5.3 oz) Documented at 07/22/2021 1751          Physical Exam:   General Appearance:    Alert,oriented, in no acute distress   Lungs:     Clear to auscultation,BS are equal    Heart:    Normal S1 and S2, RRR without murmur, gallop or rub   HEENT:    Sclerae are clear, no JVD or adenopathy   Abdomen:     Normal bowel sounds, soft nontender, nondistended, no HSM   Extremities:   Moves all extremities well, no edema, no cyanosis, no             Redness, no rash     Medication Review:      aspirin, 81 mg, Oral, Daily  atorvastatin, 40 mg, Oral, Nightly  carvedilol, 3.125 mg, Oral, BID With Meals  clopidogrel, 75 mg, Oral, Daily  nicotine, 1 patch, Transdermal, Q24H  nitroglycerin, 1.5 inch, Topical, Once             I reviewed the patient's new clinical results.  I personally viewed and interpreted the patient's EKG/Telemetry data    Assessment/Plan  There are no hospital problems to display for this patient.      Doing well will move out of the CCU start on Jardiance and probably go home tomorrow follow-up with Irma Marinelli in a week cardiac rehab in 2 weeks and see Dr. Ac Bansal in a month    Srikanth Rod MD  07/23/21  10:55 EDT             chest

## 2021-07-24 ENCOUNTER — READMISSION MANAGEMENT (OUTPATIENT)
Dept: CALL CENTER | Facility: HOSPITAL | Age: 71
End: 2021-07-24

## 2021-07-24 VITALS
DIASTOLIC BLOOD PRESSURE: 69 MMHG | BODY MASS INDEX: 27.82 KG/M2 | WEIGHT: 167 LBS | HEIGHT: 65 IN | HEART RATE: 73 BPM | SYSTOLIC BLOOD PRESSURE: 102 MMHG | TEMPERATURE: 98.1 F | RESPIRATION RATE: 24 BRPM | OXYGEN SATURATION: 92 %

## 2021-07-24 LAB — GLUCOSE BLDC GLUCOMTR-MCNC: 127 MG/DL (ref 70–130)

## 2021-07-24 PROCEDURE — 82962 GLUCOSE BLOOD TEST: CPT

## 2021-07-24 PROCEDURE — 99238 HOSP IP/OBS DSCHRG MGMT 30/<: CPT | Performed by: NURSE PRACTITIONER

## 2021-07-24 RX ORDER — ATORVASTATIN CALCIUM 40 MG/1
40 TABLET, FILM COATED ORAL NIGHTLY
Qty: 30 TABLET | Refills: 0 | Status: SHIPPED | OUTPATIENT
Start: 2021-07-24 | End: 2021-08-03 | Stop reason: SDUPTHER

## 2021-07-24 RX ORDER — NITROGLYCERIN 0.4 MG/1
0.4 TABLET SUBLINGUAL
Qty: 25 TABLET | Refills: 0 | Status: SHIPPED | OUTPATIENT
Start: 2021-07-24 | End: 2021-08-03 | Stop reason: SDUPTHER

## 2021-07-24 RX ORDER — CLOPIDOGREL BISULFATE 75 MG/1
75 TABLET ORAL DAILY
Qty: 30 TABLET | Refills: 11 | Status: SHIPPED | OUTPATIENT
Start: 2021-07-25 | End: 2022-08-11 | Stop reason: SDUPTHER

## 2021-07-24 RX ORDER — LISINOPRIL 2.5 MG/1
2.5 TABLET ORAL
Qty: 30 TABLET | Refills: 0 | Status: SHIPPED | OUTPATIENT
Start: 2021-07-25 | End: 2021-08-03 | Stop reason: SDUPTHER

## 2021-07-24 RX ADMIN — LISINOPRIL 2.5 MG: 2.5 TABLET ORAL at 08:44

## 2021-07-24 RX ADMIN — EMPAGLIFLOZIN 10 MG: 10 TABLET, FILM COATED ORAL at 08:45

## 2021-07-24 RX ADMIN — CARVEDILOL 3.12 MG: 3.12 TABLET, FILM COATED ORAL at 08:43

## 2021-07-24 RX ADMIN — ASPIRIN 81 MG: 81 TABLET, COATED ORAL at 08:44

## 2021-07-24 RX ADMIN — CLOPIDOGREL 75 MG: 75 TABLET, FILM COATED ORAL at 08:44

## 2021-07-24 NOTE — PLAN OF CARE
Goal Outcome Evaluation:  Plan of Care Reviewed With: patient        Progress: improving   Patient discharged to home. Discharged instructions/paper given.

## 2021-07-24 NOTE — OUTREACH NOTE
Prep Survey      Responses   Religion facility patient discharged from?  [facility]   Is LACE score < 7 ?  No   Emergency Room discharge w/ pulse ox?  No   Eligibility  TCM   [facility]    Date of Admission  07/22/21   Date of Discharge  07/24/21   Discharge Disposition  Home or Self Care   Discharge diagnosis  Non-STEMI (non-ST elevated myocardial infarction   Does the patient have one of the following disease processes/diagnoses(primary or secondary)?  Acute MI (STEMI,NSTEMI)   Does the patient have Home health ordered?  No   Is there a DME ordered?  No   Prep survey completed?  Yes          Yamileth Rubio RN

## 2021-07-24 NOTE — DISCHARGE SUMMARY
Cardiology   Discharge Summary    Patient Identification:  Name: Tonya Mccall  Age: 71 y.o.  Sex: female  :  1950  MRN: [id number]    Admit date: 2021    Discharge date and time:  21 10:36 am      Admitting Physician: Srikanth Rod MD     Discharge provider: LILY Huizar    Discharge Diagnoses: CAD    Patient Active Problem List   Diagnosis   • Trichomonas vaginitis   • Non-STEMI (non-ST elevated myocardial infarction) (CMS/[facility])   • Ischemic cardiomyopathy   • Mixed hyperlipidemia   • Type 2 diabetes mellitus with circulatory disorder (CMS/[facility])   • Tobacco abuse   • Coronary artery disease involving native coronary artery of native heart with angina pectoris (CMS/[facility])   • History of coronary artery stent placement   • ST elevation myocardial infarction (STEMI) (CMS/[facility])       Discharged Condition: fair    Hospital Course: This 71-year-old female, new to this provider, presented to the emergency department at [facility] 2021 with complaints of chest pain ongoing for 3 to 4 days prior.  It became severe that afternoon per emergency department note.  History as above.  She was diagnosed with an anterolateral STEMI and taken to the cardiac catheterization lab where she underwent PCI and DAO placement with 3.0 x 20 mm Xience Mildred drug-eluting stent to distal OM1.  Vital signs of been stable.  Jardiance was initiated.  Patient has been counseled on this drug to include side effects, risks, and benefits.  The importance of maintaining dual antiplatelet therapy for 1 year was reviewed with the patient.  She is being referred to cardiac rehabilitation.  Prescription for nitroglycerin sent in at discharge with instructions to patient.  Right femoral cardiac catheterization site is free of any signs or symptoms of infection or hematoma.  Discharge instructions as below.  The patient is discharged home in good condition with no complaints of chest pain, shortness of breath,  dizziness, weakness, syncope, or near syncope.    Consults:   None    Discharge Exam:  General: No acute distress; resting comfortably in bed   Skin: Warm and dry, no diaphoresis noted   EYES: PERTL   HEENT: external ear and nose normal; oral mucosa moist   Neck: Supple; no JVD   Heart: S1S2 regular rate and rhythm; no murmurs; no gallop or rub appreciated; right femoral cardiac catheterization site with no hematoma, edema, erythema, ecchymosis, or drainage; right DP/PT and femoral pulses intact   Pulmonary: Respirations regular, unlabored at rest, bilateral breath sounds have good air entry throughout all lung fields anteriorly and laterally; no crackles, rubs or wheezes auscultated.     GI: Soft, non-tender, non-distended, positive bowel sounds     Extremities: Bilateral lower extremities have no pre-tibial pitting edema; DP/PT pulses are palpable   Neurological: Alert and oriented x 3; no neuro deficits          LABS:  Results from last 7 days   Lab Units 07/23/21  0409 07/22/21  1812   TROPONIN T ng/mL 1.750* 0.206*         Results from last 7 days   Lab Units 07/23/21  0409   SODIUM mmol/L 140   POTASSIUM mmol/L 3.5   BUN mg/dL 7*   CREATININE mg/dL 0.68   CALCIUM mg/dL 8.9     @LABRCNTbnp@  Results from last 7 days   Lab Units 07/23/21  0409 07/22/21  1812   WBC 10*3/mm3 11.37* 9.68   HEMOGLOBIN g/dL 14.2 13.0   HEMATOCRIT % 44.9 38.1   PLATELETS 10*3/mm3 167 186         Results from last 7 days   Lab Units 07/23/21  0409   CHOLESTEROL mg/dL 149   TRIGLYCERIDES mg/dL 247*   HDL CHOL mg/dL 33*   LDL CHOL mg/dL 75     Disposition:  Home    Discharge Medications:      Discharge Medications      New Medications      Instructions Start Date   atorvastatin 40 MG tablet  Commonly known as: LIPITOR   40 mg, Oral, Nightly      clopidogrel 75 MG tablet  Commonly known as: PLAVIX   75 mg, Oral, Daily   Start Date: July 25, 2021     empagliflozin 10 MG tablet tablet  Commonly known as: JARDIANCE   10 mg, Oral, Daily    "Start Date: July 25, 2021     lisinopril 2.5 MG tablet  Commonly known as: PRINIVIL,ZESTRIL   2.5 mg, Oral, Every 24 Hours Scheduled   Start Date: July 25, 2021     nitroglycerin 0.4 MG SL tablet  Commonly known as: NITROSTAT   0.4 mg, Sublingual, Every 5 Minutes PRN, Take no more than 3 doses in 15 minutes.         Continue These Medications      Instructions Start Date   aspirin 81 MG tablet   81 mg, Oral, Daily      carvedilol 3.125 MG tablet  Commonly known as: COREG   TAKE ONE TABLET BY MOUTH TWICE A DAY WITH MEALS      metFORMIN 500 MG tablet  Commonly known as: GLUCOPHAGE   TAKE ONE TABLET BY MOUTH TWICE A DAY         Stop These Medications    pravastatin 40 MG tablet  Commonly known as: PRAVACHOL              Discharge Home Instructions:    1. No submerging procedure site below water for 7-10 days.  2. No lifting objects greater than 1 lbs for 3 days.  3. If groin site used, avoid climbing several flights of stairs or sitting for longer than 2 hours at a time for the next 24 hours.   4. Monitor puncture site for bleeding and/or knots;. If bleeding should occur at the groin site: lie flat, apply pressure and return to the ER. If bleeding should occur at the wrist site, apply pressure and return to the ER.  5.  You may apply a DRY Band-Aid over the puncture site if needed. Do not apply any lotions, salves or ointments to site.  6. No driving for 3 days.  7. Return to ER for recurrent symptoms.  8. No smoking.  9. Take all medications as prescribed.         Signed:  LILY Peralta  7/24/2021  10:36 EDT      EMR Dragon/Transcription:   \"Dictated utilizing Dragon dictation\".   "

## 2021-07-26 ENCOUNTER — TRANSITIONAL CARE MANAGEMENT TELEPHONE ENCOUNTER (OUTPATIENT)
Dept: CALL CENTER | Facility: HOSPITAL | Age: 71
End: 2021-07-26

## 2021-07-26 NOTE — OUTREACH NOTE
Call Center TCM Note      Responses   [facility] patient discharged from?  [place]   Does the patient have one of the following disease processes/diagnoses(primary or secondary)?  Acute MI (STEMI,NSTEMI)   TCM attempt successful?  Yes   Discharge diagnosis  Non-STEMI (non-ST elevated myocardial infarction   Meds reviewed with patient/caregiver?  Yes   Is the patient having any side effects they believe may be caused by any medication additions or changes?  No   Does the patient have all prescriptions related to this admission filled (includes statins,anticoagulants,HTN meds,anti-arrhythmia meds)  Yes   Is the patient taking all medications as directed (includes completed medication regime)?  Yes   Does the patient have a primary care provider?   Yes   Does the patient have an appointment with their PCP,cardiologist,or clinic within 7 days of discharge?  No   Comments regarding PCP  Pt would like PCP Dr Raymond Simon updated, but declines TCM FWP for now. Pt will be scheduling multiple Cardiac Rehab appts and has fwp with Cardio MD on 08/03/2021.   Has the patient kept scheduled appointments due by today?  N/A   Has home health visited the patient within 72 hours of discharge?  N/A   Psychosocial issues?  No   Did the patient receive a copy of their discharge instructions?  Yes   Nursing interventions  Reviewed instructions with patient   What is the patient's perception of their health status since discharge?  Improving   Nursing interventions  Nurse provided patient education   Is the patient/caregiver able to teach back signs and symptoms of when to call for help immediately:  Sudden chest discomfort, Nausea or vomiting, Dizziness or lightheadedness, Sudden discomfort in arms, back, neck or jaw, Shortness of breath at any time, Irregular or rapid heart rate, Sudden sweating or clammy skin   Nursing interventions  Nurse provided patient education   Is the pateint /caregiver able to teach back the  importance of cardiac rehab?  Yes   Is the patient/caregiver able to teach back lifestyle changes to help prevent MIs  Quit smoking, Regular exercise as approved by provider, Reducing stress, Heart healthy diet, Limiting alcohol intake, Maintaining a healthy weight, Managing diabetes   Is the patient/caregiver able to teach back ways to prevent a second heart attack:  Take medications, Manage risk factors, Follow up with MD, Get support (AHA website:supportnetwork.heart.org), Participate in Cardiac Rehab   Is the patient/caregiver able to teach back the hierarchy of who to call/visit for symptoms/problems? PCP, Specialist, Home health nurse, Urgent Care, ED, 911  Yes   TCM call completed?  Yes   Wrap up additional comments  Pt feeling much better. No chest pain, SOB. Cath site good. All new meds in place. No questions at this time. Pt would like PCP Dr Raymond Simon updated, but declines TCM FWP for now. Pt will be scheduling multiple Cardiac Rehab appts and has fwp with Cardio MD on 08/03/2021.          Zee Rodas MA    7/26/2021, 15:55 EDT

## 2021-07-27 NOTE — CASE MANAGEMENT/SOCIAL WORK
Case Management Discharge Note      Final Note: Discharge to home with family support.         Selected Continued Care - Discharged on 7/24/2021 Admission date: 7/22/2021 - Discharge disposition: Home or Self Care    Destination    No services have been selected for the patient.              Durable Medical Equipment    No services have been selected for the patient.              Dialysis/Infusion    No services have been selected for the patient.              Home Medical Care    No services have been selected for the patient.              Therapy    No services have been selected for the patient.              Community Resources    No services have been selected for the patient.              Community & DME    No services have been selected for the patient.                  Transportation Services  Private: Car    Final Discharge Disposition Code: 01 - home or self-care

## 2021-08-03 ENCOUNTER — OFFICE VISIT (OUTPATIENT)
Dept: CARDIOLOGY | Facility: CLINIC | Age: 71
End: 2021-08-03

## 2021-08-03 VITALS
HEART RATE: 76 BPM | BODY MASS INDEX: 26.62 KG/M2 | DIASTOLIC BLOOD PRESSURE: 70 MMHG | WEIGHT: 159.8 LBS | HEIGHT: 65 IN | SYSTOLIC BLOOD PRESSURE: 104 MMHG

## 2021-08-03 DIAGNOSIS — I25.5 ISCHEMIC CARDIOMYOPATHY: Chronic | ICD-10-CM

## 2021-08-03 DIAGNOSIS — R94.31 ABNORMAL EKG: ICD-10-CM

## 2021-08-03 DIAGNOSIS — E78.00 HYPERCHOLESTEROLEMIA: ICD-10-CM

## 2021-08-03 DIAGNOSIS — I21.02 ST ELEVATION MYOCARDIAL INFARCTION INVOLVING LEFT ANTERIOR DESCENDING (LAD) CORONARY ARTERY (HCC): ICD-10-CM

## 2021-08-03 DIAGNOSIS — I35.8 AORTIC VALVE SCLEROSIS: ICD-10-CM

## 2021-08-03 DIAGNOSIS — I25.119 CORONARY ARTERY DISEASE INVOLVING NATIVE CORONARY ARTERY OF NATIVE HEART WITH ANGINA PECTORIS (HCC): Primary | ICD-10-CM

## 2021-08-03 PROBLEM — M16.12 ARTHRITIS OF LEFT HIP: Status: ACTIVE | Noted: 2019-05-09

## 2021-08-03 PROBLEM — I21.4 NON-STEMI (NON-ST ELEVATED MYOCARDIAL INFARCTION): Chronic | Status: RESOLVED | Noted: 2018-04-22 | Resolved: 2021-08-03

## 2021-08-03 PROBLEM — E11.9 DIABETES MELLITUS (HCC): Status: ACTIVE | Noted: 2021-08-03

## 2021-08-03 PROBLEM — M16.12 OSTEOARTHRITIS OF LEFT HIP: Status: ACTIVE | Noted: 2018-12-06

## 2021-08-03 PROBLEM — Z72.0 TOBACCO ABUSE: Chronic | Status: RESOLVED | Noted: 2018-04-23 | Resolved: 2021-08-03

## 2021-08-03 PROBLEM — J44.9 COPD (CHRONIC OBSTRUCTIVE PULMONARY DISEASE) (HCC): Status: ACTIVE | Noted: 2021-08-03

## 2021-08-03 PROBLEM — Z95.5 HISTORY OF CORONARY ARTERY STENT PLACEMENT: Chronic | Status: RESOLVED | Noted: 2018-05-07 | Resolved: 2021-08-03

## 2021-08-03 PROBLEM — K21.9 GERD (GASTROESOPHAGEAL REFLUX DISEASE): Status: ACTIVE | Noted: 2021-08-03

## 2021-08-03 PROBLEM — I25.10 CORONARY ARTERY DISEASE: Status: ACTIVE | Noted: 2021-08-03

## 2021-08-03 PROBLEM — I21.9 MYOCARDIAL INFARCTION: Status: ACTIVE | Noted: 2018-12-05

## 2021-08-03 PROCEDURE — 99214 OFFICE O/P EST MOD 30 MIN: CPT | Performed by: NURSE PRACTITIONER

## 2021-08-03 PROCEDURE — 93000 ELECTROCARDIOGRAM COMPLETE: CPT | Performed by: NURSE PRACTITIONER

## 2021-08-03 RX ORDER — NITROGLYCERIN 0.4 MG/1
0.4 TABLET SUBLINGUAL
Qty: 25 TABLET | Refills: 3 | Status: SHIPPED | OUTPATIENT
Start: 2021-08-03

## 2021-08-03 RX ORDER — LISINOPRIL 2.5 MG/1
2.5 TABLET ORAL
Qty: 90 TABLET | Refills: 3 | Status: SHIPPED | OUTPATIENT
Start: 2021-08-03 | End: 2022-08-01 | Stop reason: ALTCHOICE

## 2021-08-03 RX ORDER — ATORVASTATIN CALCIUM 40 MG/1
40 TABLET, FILM COATED ORAL NIGHTLY
Qty: 90 TABLET | Refills: 3 | Status: SHIPPED | OUTPATIENT
Start: 2021-08-03 | End: 2022-11-10

## 2021-08-03 RX ORDER — CARVEDILOL 3.12 MG/1
3.12 TABLET ORAL 2 TIMES DAILY WITH MEALS
Qty: 180 TABLET | Refills: 3 | Status: SHIPPED | OUTPATIENT
Start: 2021-08-03 | End: 2021-08-18

## 2021-08-03 NOTE — PROGRESS NOTES
Date of Office Visit: 2021  Encounter Provider: LILY Doty  Place of Service: [facility] CARDIOLOGY  Patient Name: Tonya Mccall  :1950  Primary Cardiologist: Dr. Ac Bansal     Chief Complaint   Patient presents with   • Coronary Artery Disease   • Hospital Follow Up Visit   :     HPI: Tonya Mccall is a pleasant 71 y.o. female who presents today for follow-up on coronary artery disease and recent hospitalization.  She has a new patient to me and I reviewed her medical records.    In 2018, she presented to the [facility] ED with chest pain and ruled in for non-STEMI.  She underwent cardiac catheterization and drug-eluting stent placement to the mid LAD.  2D echocardiogram showed normal LVEF of 54%.    In 2021, she presented to the [facility] ED with complaints of chest pain for 4 days.  She was diagnosed with anterior lateral STEMI.  She underwent coronary angiogram with PCI and drug-eluting stent placement to the distal OM1.  Echocardiogram showed a decreased LVEF of 42%.  Jardiance was initiated and she was recommended dual antiplatelet therapy for at least 1 year.  She was referred to cardiac rehab.     She presents today for follow-up visit.  We reviewed the coronary angiogram graphically and 2D echocardiogram results.  She has felt fatigued since leaving the hospital.  Her blood pressure is on the low side of normal today.  She reports some mild dizziness upon standing.  She denies chest pain, shortness of air, palpitations, edema, syncope, or bleeding.  She has been compliant with her medications.  We discussed cardiac rehab and she wants to think about it.    Past Medical History:   Diagnosis Date   • Aortic valve sclerosis    • Atelectasis     right   • Breast nodule    • CAD (coronary artery disease)    • COPD (chronic obstructive pulmonary disease) (CMS/[facility])    • Dyspareunia in female    • Elevated cholesterol    • GERD (gastroesophageal reflux  disease)    • Herpes genitalis    • Hiatal hernia    • High cholesterol    • History of coronary artery stent placement    • History of Papanicolaou smear of cervix 2013    Pap smears hve all been normal since 2004.  They frequently have shown Trichomonas infections to be present.  2013, normal Pap smear, negative HR-HPV.  Next pap 2016.    • Hormone replacement therapy (postmenopausal)    • HPV in female    • Hyperlipidemia    • Hypertension    • Hypertriglyceridemia    • Ischemic cardiomyopathy    • LV dysfunction     with ejection fraction 30%   • Non-STEMI (non-ST elevated myocardial infarction) (CMS/[facility])    • Ovarian cystadenoma    • Pulmonary vascular congestion    • Senile (atrophic) vaginitis    • Soft tissue tumor     NECK   • Tobacco abuse    • Trichomonal vaginitis    • Type 2 diabetes mellitus with circulatory disorder (CMS/[facility]) 4/23/2018   • Uterine leiomyoma        Past Surgical History:   Procedure Laterality Date   • APPENDECTOMY     • CARDIAC CATHETERIZATION N/A 4/22/2018    Procedure: Left Heart Cath and Cors;  Surgeon: Srikanth Rod MD;  Location: [facility] CATH INVASIVE LOCATION;  Service: Cardiovascular   • CARDIAC CATHETERIZATION N/A 4/22/2018    Procedure: Peripheral angiography;  Surgeon: Srikanth Rod MD;  Location[facility] CATH INVASIVE LOC[facility];  Service: Cardiovascular   • CARDIAC CATHETERIZATION N/A 4/22/2018    Procedure: Stent DAO coronary;  Surgeon: Srikanth Rod MD;  Location: [facility] CATH INVASIVE LOCATION;  Service: Cardiovascular   • CARDIAC CATHETERIZATION N/A 7/22/2021    Procedure: Left Heart Cath;  Surgeon: Srikanth Rod MD;  Location: [facility] CATH INVASIVE LOCATION;  Service: Cardiovascular;  Laterality: N/A;   • CARDIAC CATHETERIZATION N/A 7/22/2021    Procedure: Coronary angiography;  Surgeon: Srikanth Rod MD;  Location: [facility] CATH INVASIVE LOCATION;  Service: Cardiovascular;  Laterality: N/A;   • CARDIAC CATHETERIZATION N/A 7/22/2021    Procedure: Left  ventriculography;  Surgeon: Srikanth Rod MD;  Location:  [facility] CATH INVASIVE LOCATION;  Service: Cardiovascular;  Laterality: N/A;   • CARDIAC CATHETERIZATION N/A 2021    Procedure: Stent DAO coronary;  Surgeon: Srikanth Rod MD;  Location:  [facility] CATH INVASIVE LOCATION;  Service: Cardiovascular;  Laterality: N/A;   • DILATATION AND CURETTAGE     • ESSURE TUBAL LIGATION     • TONSILLECTOMY     • TOTAL ABDOMINAL HYSTERECTOMY Bilateral     ASHLEE, BSO. Pathology showed serous cystadenoma/cystadenofibroma , bilaterally of both tubes with focal paratubal cyst. Severe Acute and Chronic Cervicitis and endocervicitis with reactive chage and focal features suggestive of HPV cystopathic effect. Disordered proliferative phase endometrium. Adenomyosis and Leiomyomas.        Social History     Socioeconomic History   • Marital status: Significant Other     Spouse name: GRANT MARIN   • Number of children: 0   • Years of education: Not on file   • Highest education level: Not on file   Tobacco Use   • Smoking status: Former Smoker     Packs/day: 1.00     Types: Cigarettes     Quit date: 2021     Years since quittin.0   • Smokeless tobacco: Never Used   • Tobacco comment: caffeine use- tea   Substance and Sexual Activity   • Alcohol use: No   • Drug use: No   • Sexual activity: Yes     Partners: Male       Family History   Problem Relation Age of Onset   • Stroke Mother         Cerebral Infarction    • Diabetes type II Mother         Mellitus    • Melanoma Father         Malignant    • Chronic granulomatous disease Brother         Wegener's Granolomatosis    • Diabetes type I Brother    • Breast cancer Maternal Aunt 60   • No Known Problems Maternal Grandmother    • No Known Problems Paternal Grandmother    • No Known Problems Paternal Aunt    • Cancer Maternal Grandfather    • No Known Problems Paternal Grandfather    • BRCA 1/2 Neg Hx    • Colon cancer Neg Hx    • Endometrial cancer Neg Hx    • Ovarian  "cancer Neg Hx        The following portion of the patient's history were reviewed and updated as appropriate: past medical history, past surgical history, past social history, past family history, allergies, current medications, and problem list.    Review of Systems   Constitutional: Positive for malaise/fatigue.   Cardiovascular: Negative.    Respiratory: Negative.    Hematologic/Lymphatic: Negative.    Neurological: Negative.        Allergies   Allergen Reactions   • Clindamycin/Lincomycin Swelling   • Flagyl [Metronidazole] Swelling     Swelling in face         Current Outpatient Medications:   •  aspirin 81 MG tablet, Take 81 mg by mouth Daily., Disp: , Rfl:   •  atorvastatin (LIPITOR) 40 MG tablet, Take 1 tablet by mouth Every Night., Disp: 90 tablet, Rfl: 3  •  carvedilol (COREG) 3.125 MG tablet, Take 1 tablet by mouth 2 (Two) Times a Day With Meals., Disp: 180 tablet, Rfl: 3  •  clopidogrel (PLAVIX) 75 MG tablet, Take 1 tablet by mouth Daily., Disp: 30 tablet, Rfl: 11  •  empagliflozin (JARDIANCE) 10 MG tablet tablet, Take 1 tablet by mouth Daily., Disp: 30 tablet, Rfl: 0  •  lisinopril (PRINIVIL,ZESTRIL) 2.5 MG tablet, Take 1 tablet by mouth Daily., Disp: 90 tablet, Rfl: 3  •  metFORMIN (GLUCOPHAGE) 500 MG tablet, TAKE ONE TABLET BY MOUTH TWICE A DAY, Disp: 180 tablet, Rfl: 0  •  nitroglycerin (NITROSTAT) 0.4 MG SL tablet, Place 1 tablet under the tongue Every 5 (Five) Minutes As Needed for Chest Pain (1-3 doses for chest pain). Take no more than 3 doses in 15 minutes., Disp: 25 tablet, Rfl: 3        Objective:     Vitals:    08/03/21 1144 08/03/21 1145   BP: 100/66 104/70   BP Location: Left arm Right arm   Pulse: 76    Weight: 72.5 kg (159 lb 12.8 oz)    Height: 165.1 cm (65\")      Body mass index is 26.59 kg/m².    PHYSICAL EXAM:    Vitals Reviewed.   General Appearance: No acute distress, well developed and well nourished.    Eyes: Conjunctiva and lids: No erythema, swelling, or discharge. Sclera " non-icteric.   HENT: Atraumatic, normocephalic. External eyes, ears, and nose normal. No hearing loss noted. Mucous membranes normal. Lips not cyanotic. Neck supple with no tenderness.  Respiratory: No signs of respiratory distress. Respiration rhythm and depth normal.   Clear to auscultation. No rales, crackles, rhonchi, or wheezing auscultated.   Cardiovascular:  Jugular Venous Pressure: Normal  Heart Rate and Rhythm: Normal, Heart Sounds: Normal S1 and S2. No S3 or S4 noted.  Murmurs: No murmurs noted. No rubs, thrills, or gallops.   Lower Extremities: No edema noted.  Gastrointestinal:  Abdomen soft, non-distended, non-tender. Normal bowel sounds.    Musculoskeletal: Normal movement of extremities  Skin and Nails: General appearance normal. No pallor, cyanosis, diaphoresis. Skin temperature normal. No clubbing of fingernails.   Psychiatric: Patient alert and oriented to person, place, and time. Speech and behavior appropriate. Normal mood and affect.       ECG 12 Lead    Date/Time: 8/3/2021 11:39 AM  Performed by: Maggie Thornton APRN  Authorized by: Maggie Thornton APRN   Comparison: compared with previous ECG from 7/23/2021  Similar to previous ECG  Rhythm: sinus rhythm  Rate: normal  BPM: 76  Conduction: conduction normal  T inversion: I, aVL, V5 and V6  QRS axis: normal  Other findings: non-specific ST-T wave changes    Clinical impression: abnormal EKG              Assessment:       Diagnosis Plan   1. Coronary artery disease involving native coronary artery of native heart with angina pectoris (CMS/HCC)     2. ST elevation myocardial infarction involving left anterior descending (LAD) coronary artery (CMS/HCC)     3. Abnormal EKG     4. Ischemic cardiomyopathy     5. Hypercholesterolemia     6. Aortic valve sclerosis            Plan:       1-3.  Coronary Artery Disease: History of non-STEMI and stent placement to the LAD.  Recent STEMI and stent placement to with stent placement to the ostial lesion.   Risk factor modification was discussed.  She will continue with dual antiplatelet therapy (aspirin and clopidogrel) for at least 1 year and atorvastatin.  Her EKG remains abnormal with T wave inversions in the anterior lateral and lateral leads.  I have recommended cardiac rehab.    4.  Ischemic Cardiomyopathy: LVEF now 42%.  She feels more fatigued which may be from the left ventricular dysfunction, recent STEMI, and/or the addition of carvedilol.  I recommended that she continue with the carvedilol and lisinopril.  We will repeat an echocardiogram in 90 days.  I am unable to further titrate her medication due to her lower blood pressure today.    5.  Hypercholesterolemia: Remains on atorvastatin.    6.  Aortic Valve Sclerosis: Noted on recent echocardiogram.    7.  Since she had a non-STEMI and ischemic cardiomyopathy, I recommended a 4 to 6-week follow-up visit with Dr. Ac Bansal.  I have refilled her cardiac medications.    As always, it has been a pleasure to participate in your patient's care. Thank you.       Sincerely,       LILY Bowen  [facility] Cardiology      · COVID-19 Precautions - Patient was compliant in wearing a mask. When I saw the patient, I used appropriate personal protective equipment (PPE) including mask and eye shield (standard procedure).  Additionally, I used gown and gloves if indicated.  Hand hygiene was completed before and after seeing the patient.  · Dictated utilizing Dragon Dictation  · I spent 30 minutes reviewing her medical records/testing/previous office notes/labs, face-to-face interaction with patient, physical examination, formulating the plan of care, and discussion of plan of care with patient.

## 2021-08-04 ENCOUNTER — READMISSION MANAGEMENT (OUTPATIENT)
Dept: CALL CENTER | Facility: HOSPITAL | Age: 71
End: 2021-08-04

## 2021-08-04 NOTE — OUTREACH NOTE
AMI Week 2 Survey      Responses   [facility] patient discharged from?  [place]   Does the patient have one of the following disease processes/diagnoses(primary or secondary)?  Acute MI (STEMI,NSTEMI)   Week 2 attempt successful?  Yes   Call start time  1339   Call end time  1342   Discharge diagnosis  Non-STEMI (non-ST elevated myocardial infarction   Meds reviewed with patient/caregiver?  Yes   Is the patient taking all medications as directed (includes completed medication regime)?  Yes   Has the patient kept scheduled appointments due by today?  Yes   Psychosocial issues?  No   Comments  Pt denies CP. She is concerned about her EF of 42%   What is the patient's perception of their health status since discharge?  Improving   Nursing interventions  Nurse provided patient education   Is the patient/caregiver able to teach back lifestyle changes to help prevent MIs  Regular exercise as approved by provider, Heart healthy diet, Managing diabetes   Is the patient/caregiver able to teach back the hierarchy of who to call/visit for symptoms/problems? PCP, Specialist, Home health nurse, Urgent Care, ED, 911  Yes   Week 2 call completed?  Yes          Leticia Philippe RN

## 2021-08-06 ENCOUNTER — TELEPHONE (OUTPATIENT)
Dept: CARDIAC REHAB | Facility: HOSPITAL | Age: 71
End: 2021-08-06

## 2021-08-18 RX ORDER — CARVEDILOL 3.12 MG/1
TABLET ORAL
Qty: 180 TABLET | Refills: 1 | Status: SHIPPED | OUTPATIENT
Start: 2021-08-18 | End: 2022-07-19 | Stop reason: SDUPTHER

## 2021-08-23 NOTE — TELEPHONE ENCOUNTER
Caller: Tonya Mccall    Relationship: Self    Best call back number: [phone]     Medication needed:   Requested Prescriptions     Pending Prescriptions Disp Refills   • empagliflozin (JARDIANCE) 10 MG tablet tablet 30 tablet 0     Sig: Take 1 tablet by mouth Daily.       When do you need the refill by: 08/23/21    What additional details did the patient provide when requesting the medication: PATIENT WAS PRESCRIBED THIS IN HOSPITAL LAST MONTH NEEDS REFILL TOOK LAST ONE TODAY AND IS WANTING 90 SUPPLY.    Does the patient have less than a 3 day supply:  [x] Yes  [] No    What is the patient's preferred pharmacy: [facility] AT [facility] [address] - [phone]  - [phone] FX

## 2021-09-14 ENCOUNTER — OFFICE VISIT (OUTPATIENT)
Dept: CARDIOLOGY | Facility: CLINIC | Age: 71
End: 2021-09-14

## 2021-09-14 VITALS
DIASTOLIC BLOOD PRESSURE: 80 MMHG | HEIGHT: 65 IN | WEIGHT: 163 LBS | BODY MASS INDEX: 27.16 KG/M2 | HEART RATE: 83 BPM | SYSTOLIC BLOOD PRESSURE: 110 MMHG

## 2021-09-14 DIAGNOSIS — E11.59 TYPE 2 DIABETES MELLITUS WITH OTHER CIRCULATORY COMPLICATION, WITHOUT LONG-TERM CURRENT USE OF INSULIN (HCC): Chronic | ICD-10-CM

## 2021-09-14 DIAGNOSIS — E78.2 MIXED HYPERLIPIDEMIA: Chronic | ICD-10-CM

## 2021-09-14 DIAGNOSIS — I25.5 ISCHEMIC CARDIOMYOPATHY: Chronic | ICD-10-CM

## 2021-09-14 DIAGNOSIS — I25.119 CORONARY ARTERY DISEASE INVOLVING NATIVE CORONARY ARTERY OF NATIVE HEART WITH ANGINA PECTORIS (HCC): Primary | ICD-10-CM

## 2021-09-14 PROCEDURE — 99214 OFFICE O/P EST MOD 30 MIN: CPT | Performed by: INTERNAL MEDICINE

## 2021-09-14 NOTE — PROGRESS NOTES
Subjective:     Encounter Date:  09/14/21      Patient ID: Tonya Mccall is a 71 y.o. female.    Chief Complaint: CAD  History of Present Illness    Dear Dr. Simon,    I had the pleasure seeing this patient in the office today for follow-up of her cardiac status.  She has a history of coronary artery disease.  She has a history of drug-eluting stent in the mid LAD.    She feels like she is doing well.  No pain or discomfort.  No shortness of breath.  She has been doing more walking.  She did not sign up for cardiac rehab, she decided just to do more walking on her own.    In July 2021, she presented to the [facility] ED with complaints of chest pain for 4 days.  She was diagnosed with anterior lateral STEMI.  She underwent coronary angiogram with PCI and drug-eluting stent placement to the distal OM1.  Echocardiogram showed a decreased LVEF of 42%.  Jardiance was initiated and she was recommended dual antiplatelet therapy for at least 1 year.       On 4/21/2018 she presented to the emergency room with chest pain and ruled in for a non-STEMI.  She underwent cardiac catheterization on 4/22/2018.  This showed an EF of around 30%, 20% left main, 90% mid LAD, 20-30% distal LAD, 30% diagonal, 50% circumflex, and a 50% mid RCA lesion.  She underwent successful drug-eluting stent placement to the mid LAD lesion.  An echocardiogram obtained the following day showed normalization of her EF at 54% and no significant valvular abnormalities.  Antiplatelet therapy was recommended for one year.    The following portions of the patient's history were reviewed and updated as appropriate: allergies, current medications, past family history, past medical history, past social history, past surgical history and problem list.    Past Medical History:   Diagnosis Date   • Aortic valve sclerosis    • Atelectasis     right   • Breast nodule    • CAD (coronary artery disease)    • COPD (chronic obstructive pulmonary disease) (CMS/[facility])    •  Dyspareunia in female    • Elevated cholesterol    • GERD (gastroesophageal reflux disease)    • Herpes genitalis    • Hiatal hernia    • High cholesterol    • History of coronary artery stent placement    • History of Papanicolaou smear of cervix 2013    Pap smears hve all been normal since 2004.  They frequently have shown Trichomonas infections to be present.  2013, normal Pap smear, negative HR-HPV.  Next pap 2016.    • Hormone replacement therapy (postmenopausal)    • HPV in female    • Hyperlipidemia    • Hypertension    • Hypertriglyceridemia    • Ischemic cardiomyopathy    • LV dysfunction     with ejection fraction 30%   • Non-STEMI (non-ST elevated myocardial infarction) (CMS/[facility])    • Ovarian cystadenoma    • Pulmonary vascular congestion    • Senile (atrophic) vaginitis    • Soft tissue tumor     NECK   • Tobacco abuse    • Trichomonal vaginitis    • Type 2 diabetes mellitus with circulatory disorder (CMS/[facility]) 4/23/2018   • Uterine leiomyoma        Past Surgical History:   Procedure Laterality Date   • APPENDECTOMY     • CARDIAC CATHETERIZATION N/A 4/22/2018    Procedure: Left Heart Cath and Cors;  Surgeon: Srikanth Rod MD;  Location: [facility] CATH INVASIVE LOCATION;  Service: Cardiovascular   • CARDIAC CATHETERIZATION N/A 4/22/2018    Procedure: Peripheral angiography;  Surgeon: Srikanth Rod MD;  Location:  [facility] CATH INVASIVE LOCATION;  Service: Cardiovascular   • CARDIAC CATHETERIZATION N/A 4/22/2018    Procedure: Stent DAO coronary;  Surgeon: Srikanth Rod MD;  Location: [facility]ATH [facility]AS[facility] LOC[facility];  Service: Cardiovascular   • CARDIAC CATHETERIZATION N/A 7/22/2021    Procedure: Left Heart Cath;  Surgeon: Srikanth Rod MD;  Location: [facility] CATH INVASIVE LOCATION;  Service: Cardiovascular;  Laterality: N/A;   • CARDIAC CATHETERIZATION N/A 7/22/2021    Procedure: Coronary angiography;  Surgeon: Srikanth Rod MD;  Location: [facility] CATH [facility]VASIVE LOCATION;  Service: Cardiovascular;   "Laterality: N/A;   • CARDIAC CATHETERIZATION N/A 7/22/2021    Procedure: Left ventriculography;  Surgeon: Srikanth Rod MD;  Location:  [facility] CATH INVASIVE LOCATION;  Service: Cardiovascular;  Laterality: N/A;   • CARDIAC CATHETERIZATION N/A 7/22/2021    Procedure: Stent DAO coronary;  Surgeon: Srikanth Rod MD;  Location:  [facility] CATH INVASIVE LOCATION;  Service: Cardiovascular;  Laterality: N/A;   • DILATATION AND CURETTAGE     • ESSURE TUBAL LIGATION     • TONSILLECTOMY     • TOTAL ABDOMINAL HYSTERECTOMY Bilateral 2006    ASHLEE, BSO. Pathology showed serous cystadenoma/cystadenofibroma , bilaterally of both tubes with focal paratubal cyst. Severe Acute and Chronic Cervicitis and endocervicitis with reactive chage and focal features suggestive of HPV cystopathic effect. Disordered proliferative phase endometrium. Adenomyosis and Leiomyomas.            Procedures       Objective:     Vitals:    09/14/21 1340   BP: 110/80   Pulse: 83   Weight: 73.9 kg (163 lb)   Height: 165.1 cm (65\")     General Appearance:    Alert, cooperative, in no acute distress   Head:    Normocephalic, without obvious abnormality, atraumatic   Eyes:            Lids and lashes normal, conjunctivae and sclerae normal, no   icterus, no pallor, corneas clear   Ears:    Ears appear intact with no abnormalities noted   Throat:   No oral lesions, no thrush, oral mucosa moist   Neck:   No adenopathy, supple, trachea midline, no thyromegaly, no   carotid bruit, no JVD   Back:     No kyphosis present, no scoliosis present, no skin lesions, erythema or scars, no tenderness to percussion or palpation, range of motion normal   Lungs:     Clear to auscultation,respirations regular, even and unlabored    Heart:    Regular rhythm and normal rate, normal S1 and S2, no murmur, no gallop, no rub, no click   Chest Wall:    No abnormalities observed   Abdomen:     Normal bowel sounds, no masses, no organomegaly, soft        non-tender, non-distended, no " guarding, no rebound  tenderness   Extremities:   Moves all extremities well, no edema, no cyanosis, no redness   Pulses:   Pulses palpable and equal bilaterally. Normal radial, carotid, femoral, dorsalis pedis and posterior tibial pulses bilaterally. Normal abdominal aorta   Skin:  Psychiatric:   No bleeding, bruising or rash    Alert and oriented x 3, normal mood and affect           Lab Review:             Lab Results   Component Value Date    GLUCOSE 134 (H) 07/23/2021    BUN 7 (L) 07/23/2021    CREATININE 0.68 07/23/2021    EGFRIFNONA 85 07/23/2021    BCR 10.3 07/23/2021    K 3.5 07/23/2021    CO2 24.0 07/23/2021    CALCIUM 8.9 07/23/2021    ALBUMIN 3.20 (L) 07/22/2021    LABIL2 1.2 04/16/2019    AST 28 07/22/2021    ALT 23 07/22/2021     CBC    CBC 7/22/21 7/23/21   WBC 9.68 11.37 (A)   RBC 4.08 4.63   Hemoglobin 13.0 14.2   Hematocrit 38.1 44.9   MCV 93.4 97.0   MCH 31.9 30.7   MCHC 34.1 31.6   RDW 12.8 12.8   Platelets 186 167   (A) Abnormal value            Results for orders placed during the hospital encounter of 07/22/21    Adult Transthoracic Echo Complete W/ Cont if Necessary Per Protocol    Interpretation Summary  · Calculated left ventricular EF = 42.2% Estimated left ventricular EF was in agreement with the calculated left ventricular EF. Left ventricular systolic function is mildly decreased. Left ventricular diastolic function is consistent with (grade I) impaired relaxation.  · The following left ventricular wall segments are akinetic: apical lateral, basal inferolateral and mid inferolateral.  · The aortic valve exhibits sclerosis. The aortic valve appears trileaflet. No significant aortic valve regurgitation is present. No aortic valve stenosis is present.            Assessment:          Diagnosis Plan   1. Coronary artery disease involving native coronary artery of native heart with angina pectoris (CMS/HCC)     2. Ischemic cardiomyopathy     3. Mixed hyperlipidemia     4. Type 2 diabetes  mellitus with other circulatory complication, without long-term current use of insulin (CMS/[facility])            Plan:       1. Coronary Artery Disease  Assessment  • The patient has no angina    Plan  • Lifestyle modifications discussed include adhering to a heart healthy diet, avoidance of tobacco products, maintenance of a healthy weight, medication compliance, regular exercise and regular monitoring of cholesterol and blood pressure    Subjective - Objective  • There is a history of past MI  • There has been a previous stent procedure using DAO  • Current antiplatelet therapy includes aspirin 81 mg and clopidogrel 75 mg  • The patient qualifies for cardiac rehabilitation, and has been referred to cardiac rehab      2.  History of non-ST segment elevation MI  3.  Ischemic cardiomyopathy-continue current medical therapy with the carvedilol, lisinopril, and aspirin,   4.  Diabetes mellitus with circulatory complication  5.  Mixed hyperlipidemia-continue lipid-lowering therapy with pravastatin      Thank you very much for allowing us to participate in the care of this pleasant patient.  Please don't hesitate to call if I can be of assistance in any way.      Current Outpatient Medications:   •  aspirin 81 MG tablet, Take 81 mg by mouth Daily., Disp: , Rfl:   •  atorvastatin (LIPITOR) 40 MG tablet, Take 1 tablet by mouth Every Night., Disp: 90 tablet, Rfl: 3  •  carvedilol (COREG) 3.125 MG tablet, TAKE ONE TABLET BY MOUTH TWICE A DAY WITH MEALS, Disp: 180 tablet, Rfl: 1  •  clopidogrel (PLAVIX) 75 MG tablet, Take 1 tablet by mouth Daily., Disp: 30 tablet, Rfl: 11  •  empagliflozin (JARDIANCE) 10 MG tablet tablet, Take 1 tablet by mouth Daily., Disp: 30 tablet, Rfl: 0  •  lisinopril (PRINIVIL,ZESTRIL) 2.5 MG tablet, Take 1 tablet by mouth Daily., Disp: 90 tablet, Rfl: 3  •  metFORMIN (GLUCOPHAGE) 500 MG tablet, TAKE ONE TABLET BY MOUTH TWICE A DAY, Disp: 180 tablet, Rfl: 0  •  nitroglycerin (NITROSTAT) 0.4 MG SL tablet,  Place 1 tablet under the tongue Every 5 (Five) Minutes As Needed for Chest Pain (1-3 doses for chest pain). Take no more than 3 doses in 15 minutes., Disp: 25 tablet, Rfl: 3

## 2021-09-20 RX ORDER — EMPAGLIFLOZIN 10 MG/1
TABLET, FILM COATED ORAL
Qty: 30 TABLET | Refills: 0 | Status: SHIPPED | OUTPATIENT
Start: 2021-09-20 | End: 2021-10-03

## 2021-10-03 RX ORDER — EMPAGLIFLOZIN 10 MG/1
TABLET, FILM COATED ORAL
Qty: 30 TABLET | Refills: 0 | Status: SHIPPED | OUTPATIENT
Start: 2021-10-03 | End: 2021-11-24

## 2021-11-24 RX ORDER — EMPAGLIFLOZIN 10 MG/1
TABLET, FILM COATED ORAL
Qty: 30 TABLET | Refills: 0 | Status: SHIPPED | OUTPATIENT
Start: 2021-11-24 | End: 2021-12-29 | Stop reason: SDUPTHER

## 2021-11-24 NOTE — TELEPHONE ENCOUNTER
Rx Refill Note  Requested Prescriptions     Pending Prescriptions Disp Refills   • Jardiance 10 MG tablet tablet [Pharmacy Med Name: JARDIANCE 10 MG TABLET] 30 tablet 0     Sig: TAKE ONE TABLET BY MOUTH DAILY      Last office visit with prescribing clinician: Visit date not found      Next office visit with prescribing clinician: Visit date not found            Logan Zamudio MA  11/24/21, 10:51 EST

## 2021-12-29 NOTE — TELEPHONE ENCOUNTER
Rx Refill Note  Requested Prescriptions     Pending Prescriptions Disp Refills   • empagliflozin (Jardiance) 10 MG tablet tablet 30 tablet 0     Sig: Take 1 tablet by mouth Daily.      Last office visit with prescribing clinician: Visit date not found      Next office visit with prescribing clinician: Visit date not found            Mimi Plasencia MA  12/29/21, 11:55 EST

## 2021-12-29 NOTE — TELEPHONE ENCOUNTER
Caller: Tonya Mccall    Relationship: Self    Best call back number: [phone]    Requested Prescriptions:   Requested Prescriptions     Pending Prescriptions Disp Refills   • empagliflozin (Jardiance) 10 MG tablet tablet 30 tablet 0     Sig: Take 1 tablet by mouth Daily.        Pharmacy where request should be sent: [facility] [facility] [address] - [phone]  - [phone] FX     Additional details provided by patient: PATIENT IS REQUESTING TO HAVE 90 DAY SUPPLY     Does the patient have less than a 3 day supply:  [x] Yes  [] No    Osman Abdullahi Rep   12/29/21 11:52 EST

## 2021-12-30 NOTE — TELEPHONE ENCOUNTER
Requesting 90 day supply    Rx Refill Note  Requested Prescriptions     Pending Prescriptions Disp Refills   • empagliflozin (Jardiance) 10 MG tablet tablet 90 tablet 1     Sig: Take 1 tablet by mouth Daily.      Last office visit with prescribing clinician: Visit date not found      Next office visit with prescribing clinician: Visit date not found            Maribell Guerrero MA  12/30/21, 11:45 EST

## 2022-03-30 ENCOUNTER — OFFICE VISIT (OUTPATIENT)
Dept: INTERNAL MEDICINE | Facility: CLINIC | Age: 72
End: 2022-03-30

## 2022-03-30 VITALS
RESPIRATION RATE: 16 BRPM | HEIGHT: 65 IN | WEIGHT: 162 LBS | DIASTOLIC BLOOD PRESSURE: 68 MMHG | TEMPERATURE: 97.1 F | BODY MASS INDEX: 26.99 KG/M2 | OXYGEN SATURATION: 93 % | HEART RATE: 73 BPM | SYSTOLIC BLOOD PRESSURE: 120 MMHG

## 2022-03-30 DIAGNOSIS — Z12.11 SCREEN FOR COLON CANCER: ICD-10-CM

## 2022-03-30 DIAGNOSIS — I25.5 ISCHEMIC CARDIOMYOPATHY: ICD-10-CM

## 2022-03-30 DIAGNOSIS — E78.2 MIXED HYPERLIPIDEMIA: ICD-10-CM

## 2022-03-30 DIAGNOSIS — I25.119 CORONARY ARTERY DISEASE INVOLVING NATIVE CORONARY ARTERY OF NATIVE HEART WITH ANGINA PECTORIS: ICD-10-CM

## 2022-03-30 DIAGNOSIS — E11.59 TYPE 2 DIABETES MELLITUS WITH OTHER CIRCULATORY COMPLICATION, WITHOUT LONG-TERM CURRENT USE OF INSULIN: Primary | ICD-10-CM

## 2022-03-30 PROCEDURE — 99214 OFFICE O/P EST MOD 30 MIN: CPT | Performed by: INTERNAL MEDICINE

## 2022-03-30 NOTE — PROGRESS NOTES
"Chief Complaint  Hypertension, Diabetes, and Hyperlipidemia    Johnnie Mccall presents to [facility]  History of Present Illness  Blood sugar has reportedly been good.  No polyuria or polydipsia or visual changes.  No chest pains or shortness of breath.  Taking medication as prescribed.  No side effects reported.    Objective   Vital Signs:   /68 (BP Location: Left arm, Patient Position: Sitting, Cuff Size: Large Adult)   Pulse 73   Temp 97.1 °F (36.2 °C) (Temporal)   Resp 16   Ht 165.1 cm (65\")   Wt 73.5 kg (162 lb)   SpO2 93%   BMI 26.96 kg/m²            Physical Exam  Vitals and nursing note reviewed.   Constitutional:       Appearance: Normal appearance.   HENT:      Head: Normocephalic and atraumatic.   Cardiovascular:      Rate and Rhythm: Normal rate and regular rhythm.   Pulmonary:      Effort: Pulmonary effort is normal.      Breath sounds: Normal breath sounds.   Abdominal:      General: Abdomen is flat.      Palpations: Abdomen is soft.   Musculoskeletal:         General: No swelling or deformity.      Cervical back: Neck supple.      Right lower leg: No edema.      Left lower leg: No edema.   Skin:     General: Skin is warm.      Capillary Refill: Capillary refill takes less than 2 seconds.      Findings: No rash.      Comments: Diabetic Foot Exam Performed   Neurological:      General: No focal deficit present.      Mental Status: She is alert and oriented to person, place, and time.        Result Review : Previous labs                Assessment and Plan type 2 diabetes, hypertension and hyperlipidemia and coronary disease.  Stable angina.  Try to increase activity or exercise.  Repeat lab work and follow-up in 4 months for wellness exam or pending lab results  Diagnoses and all orders for this visit:    1. Type 2 diabetes mellitus with other circulatory complication, without long-term current use of insulin (HCC) " (Primary)  -     Comprehensive Metabolic Panel  -     Hemoglobin A1c  -     Lipid Panel With / Chol / HDL Ratio  -     Microalbumin / Creatinine Urine Ratio - Urine, Clean Catch  -     TSH    2. Screen for colon cancer  -     Cologuard - Stool, Per Rectum; Future        Follow Up   Return in about 4 months (around 7/30/2022) for Medicare Wellness.  Patient was given instructions and counseling regarding her condition or for health maintenance advice. Please see specific information pulled into the AVS if appropriate.

## 2022-03-31 LAB
ALBUMIN SERPL-MCNC: 4.4 G/DL (ref 3.7–4.7)
ALBUMIN/CREAT UR: 5 MG/G CREAT (ref 0–29)
ALBUMIN/GLOB SERPL: 1.5 {RATIO} (ref 1.2–2.2)
ALP SERPL-CCNC: 65 IU/L (ref 44–121)
ALT SERPL-CCNC: 20 IU/L (ref 0–32)
AST SERPL-CCNC: 21 IU/L (ref 0–40)
BILIRUB SERPL-MCNC: 0.6 MG/DL (ref 0–1.2)
BUN SERPL-MCNC: 13 MG/DL (ref 8–27)
BUN/CREAT SERPL: 18 (ref 12–28)
CALCIUM SERPL-MCNC: 9.6 MG/DL (ref 8.7–10.3)
CHLORIDE SERPL-SCNC: 103 MMOL/L (ref 96–106)
CHOLEST SERPL-MCNC: 140 MG/DL (ref 100–199)
CHOLEST/HDLC SERPL: 3.8 RATIO (ref 0–4.4)
CO2 SERPL-SCNC: 21 MMOL/L (ref 20–29)
CREAT SERPL-MCNC: 0.71 MG/DL (ref 0.57–1)
CREAT UR-MCNC: 95.5 MG/DL
EGFRCR SERPLBLD CKD-EPI 2021: 90 ML/MIN/1.73
GLOBULIN SER CALC-MCNC: 2.9 G/DL (ref 1.5–4.5)
GLUCOSE SERPL-MCNC: 100 MG/DL (ref 65–99)
HBA1C MFR BLD: 6 % (ref 4.8–5.6)
HDLC SERPL-MCNC: 37 MG/DL
LDLC SERPL CALC-MCNC: 69 MG/DL (ref 0–99)
MICROALBUMIN UR-MCNC: 4.5 UG/ML
POTASSIUM SERPL-SCNC: 4.2 MMOL/L (ref 3.5–5.2)
PROT SERPL-MCNC: 7.3 G/DL (ref 6–8.5)
SODIUM SERPL-SCNC: 141 MMOL/L (ref 134–144)
TRIGL SERPL-MCNC: 203 MG/DL (ref 0–149)
TSH SERPL DL<=0.005 MIU/L-ACNC: 1 UIU/ML (ref 0.45–4.5)
VLDLC SERPL CALC-MCNC: 34 MG/DL (ref 5–40)

## 2022-07-19 ENCOUNTER — OFFICE VISIT (OUTPATIENT)
Dept: CARDIOLOGY | Facility: CLINIC | Age: 72
End: 2022-07-19

## 2022-07-19 VITALS
HEIGHT: 65 IN | SYSTOLIC BLOOD PRESSURE: 116 MMHG | HEART RATE: 73 BPM | WEIGHT: 159.8 LBS | BODY MASS INDEX: 26.62 KG/M2 | DIASTOLIC BLOOD PRESSURE: 78 MMHG

## 2022-07-19 DIAGNOSIS — I25.119 CORONARY ARTERY DISEASE INVOLVING NATIVE CORONARY ARTERY OF NATIVE HEART WITH ANGINA PECTORIS: Primary | ICD-10-CM

## 2022-07-19 DIAGNOSIS — I10 PRIMARY HYPERTENSION: ICD-10-CM

## 2022-07-19 DIAGNOSIS — E78.2 MIXED HYPERLIPIDEMIA: Chronic | ICD-10-CM

## 2022-07-19 DIAGNOSIS — I25.5 ISCHEMIC CARDIOMYOPATHY: ICD-10-CM

## 2022-07-19 PROBLEM — R94.31 ABNORMAL EKG: Status: RESOLVED | Noted: 2021-08-03 | Resolved: 2022-07-19

## 2022-07-19 PROBLEM — E78.00 HYPERCHOLESTEROLEMIA: Status: RESOLVED | Noted: 2021-08-03 | Resolved: 2022-07-19

## 2022-07-19 PROBLEM — I21.3 ST ELEVATION MYOCARDIAL INFARCTION (STEMI): Status: RESOLVED | Noted: 2021-07-23 | Resolved: 2022-07-19

## 2022-07-19 PROCEDURE — 99214 OFFICE O/P EST MOD 30 MIN: CPT | Performed by: NURSE PRACTITIONER

## 2022-07-19 PROCEDURE — 93000 ELECTROCARDIOGRAM COMPLETE: CPT | Performed by: NURSE PRACTITIONER

## 2022-07-19 RX ORDER — CARVEDILOL 3.12 MG/1
6.25 TABLET ORAL 2 TIMES DAILY WITH MEALS
Qty: 120 TABLET | Refills: 0
Start: 2022-07-19 | End: 2022-08-01 | Stop reason: SDUPTHER

## 2022-07-19 RX ORDER — NEOMYCIN SULFATE, POLYMYXIN B SULFATE, AND DEXAMETHASONE 3.5; 10000; 1 MG/G; [USP'U]/G; MG/G
OINTMENT OPHTHALMIC
COMMUNITY
Start: 2022-07-07 | End: 2022-08-01 | Stop reason: ALTCHOICE

## 2022-07-19 RX ORDER — METHYLPREDNISOLONE 4 MG/1
TABLET ORAL
COMMUNITY
Start: 2022-07-01 | End: 2022-08-01 | Stop reason: ALTCHOICE

## 2022-07-19 NOTE — PATIENT INSTRUCTIONS
Carvedilol 3.125 mg (for chest pain)  - 2 tablet sin AM  - 2 tablets in PM    Schedule echocardiogram   FU in 2-3 weeks with Maggie Thornton

## 2022-07-19 NOTE — PROGRESS NOTES
"  Date of Office Visit: 2022  Encounter Provider: LILY Doty  Place of Service: [facility] CARDIOLOGY  Patient Name: Tonya Mccall  :1950  Primary Cardiologist: Dr. Ac Bansal     Chief Complaint   Patient presents with   • Coronary Artery Disease   • Chest Pain   :     HPI: Tonya Mccall is a pleasant 72 y.o. female who presents today for evaluation of chest pain.  I have reviewed her medical records.    In 2018, she presented to the [facility] ED with chest pain and diagnosed with a non-STEMI.  She underwent cardiac catheterization and drug-eluting stent placement to the mid LAD. 2D echocardiogram showed normal LVEF of 54%.     In 2021, she presented to the [facility] ED with complaints of chest pain for 4 days and was diagnosed with anterior lateral STEMI.  She underwent coronary angiogram with PCI and drug-eluting stent placement to the distal OM1.  Echocardiogram showed decreased LVEF of 42%.  Jardiance was initiated and she was recommended dual antiplatelet therapy for at least 1 year.      Over the past 6 weeks, she has been under an increased amount of stress.  She has been experiencing intermittent chest pain that she describes as a \"discomfort\" that lasts 1 to 2 minutes with nonexertional activities and no other associated symptoms.  She says this is different than her previous anginal symptoms and not quite as severe.  She reports some mild dyspnea which she overexerts and occasional lightheadedness when getting up.  She denies PND, orthopnea, edema, palpitations, syncope, or bleeding.  Her blood pressure and heart rate are normal.      Past Medical History:   Diagnosis Date   • Aortic valve sclerosis    • Atelectasis     right   • Breast nodule    • CAD (coronary artery disease)    • COPD (chronic obstructive pulmonary disease) ([facility])    • Dyspareunia in female    • GERD (gastroesophageal reflux disease)    • Herpes genitalis    • Hiatal hernia  "   • History of Papanicolaou smear of cervix 2013    Pap smears hve all been normal since 2004.  They frequently have shown Trichomonas infections to be present.  2013, normal Pap smear, negative HR-HPV.  Next pap 2016.    • Hormone replacement therapy (postmenopausal)    • HPV in female    • Hyperlipidemia    • Hypertension    • Hypertriglyceridemia    • Ischemic cardiomyopathy    • Non-STEMI (non-ST elevated myocardial infarction) ([facility])    • Ovarian cystadenoma    • Senile (atrophic) vaginitis    • Soft tissue tumor     NECK   • ST elevation myocardial infarction (STEMI) ([facility]) 07/23/2021   • Tobacco abuse    • Trichomonal vaginitis    • Type 2 diabetes mellitus with circulatory disorder ([facility]) 04/23/2018   • Uterine leiomyoma        Past Surgical History:   Procedure Laterality Date   • APPENDECTOMY     • CARDIAC CATHETERIZATION N/A 4/22/2018    Procedure: Left Heart Cath and Cors;  Surgeon: Srikanth Rod MD;  Location: [facility] CATH INVASIVE LOCATION;  Service: Cardiovascular   • CARDIAC CATHETERIZATION N/A 4/22/2018    Procedure: Peripheral angiography;  Surgeon: Srikanth Rod MD;  Location: [facility] CATH INVASIVE LOCATION;  Service: Cardiovascular   • CARDIAC CATHETERIZATION N/A 4/22/2018    Procedure: Stent DAO coronary;  Surgeon: Srikanth Rod MD;  Location: [facility] CATH INVASIVE LOCATION;  Service: Cardiovascular   • CARDIAC CATHETERIZATION N/A 7/22/2021    Procedure: Left Heart Cath;  Surgeon: Srikanth Rod MD;  Location: [facility] CATH INVASIVE LOCATION;  Service: Cardiovascular;  Laterality: N/A;   • CARDIAC CATHETERIZATION N/A 7/22/2021    Procedure: Coronary angiography;  Surgeon: Srikanth Rod MD;  Location: [facility] CATH INVASIVE LOCATION;  Service: Cardiovascular;  Laterality: N/A;   • CARDIAC CATHETERIZATION N/A 7/22/2021    Procedure: Left ventriculography;  Surgeon: Srikanth Rod MD;  Location: [facility] CATH INVASIVE LOCATION;  Service: Cardiovascular;  Laterality: N/A;   • CARDIAC  CATHETERIZATION N/A 2021    Procedure: Stent DAO coronary;  Surgeon: Srikanth Rod MD;  Location: [facility] CATH INVASIVE LOCATION;  Service: Cardiovascular;  Laterality: N/A;   • DILATATION AND CURETTAGE     • ESSURE TUBAL LIGATION     • TONSILLECTOMY     • TOTAL ABDOMINAL HYSTERECTOMY Bilateral     ASHLEE, BSO. Pathology showed serous cystadenoma/cystadenofibroma , bilaterally of both tubes with focal paratubal cyst. Severe Acute and Chronic Cervicitis and endocervicitis with reactive chage and focal features suggestive of HPV cystopathic effect. Disordered proliferative phase endometrium. Adenomyosis and Leiomyomas.        Social History     Socioeconomic History   • Marital status: Significant Other     Spouse name: GRANT MARIN   • Number of children: 0   Tobacco Use   • Smoking status: Former Smoker     Packs/day: 1.00     Types: Cigarettes     Quit date: 2021     Years since quittin.9   • Smokeless tobacco: Never Used   • Tobacco comment: caffeine use- tea   Substance and Sexual Activity   • Alcohol use: No   • Drug use: No   • Sexual activity: Yes     Partners: Male       Family History   Problem Relation Age of Onset   • Stroke Mother         Cerebral Infarction    • Diabetes type II Mother         Mellitus    • Melanoma Father         Malignant    • Chronic granulomatous disease Brother         Wegener's Granolomatosis    • Diabetes type I Brother    • Breast cancer Maternal Aunt 60   • No Known Problems Maternal Grandmother    • No Known Problems Paternal Grandmother    • No Known Problems Paternal Aunt    • Cancer Maternal Grandfather    • No Known Problems Paternal Grandfather    • BRCA 1/2 Neg Hx    • Colon cancer Neg Hx    • Endometrial cancer Neg Hx    • Ovarian cancer Neg Hx        The following portion of the patient's history were reviewed and updated as appropriate: past medical history, past surgical history, past social history, past family history, allergies, current medications,  "and problem list.    Review of Systems   Constitutional: Negative.   Cardiovascular: Positive for chest pain and dyspnea on exertion.   Respiratory: Positive for snoring.    Hematologic/Lymphatic: Bruises/bleeds easily.   Neurological: Positive for light-headedness.       Allergies   Allergen Reactions   • Clindamycin/Lincomycin Swelling   • Flagyl [Metronidazole] Swelling     Swelling in face         Current Outpatient Medications:   •  aspirin 81 MG tablet, Take 81 mg by mouth Daily., Disp: , Rfl:   •  atorvastatin (LIPITOR) 40 MG tablet, Take 1 tablet by mouth Every Night., Disp: 90 tablet, Rfl: 3  •  carvedilol (COREG) 3.125 MG tablet, TAKE ONE TABLET BY MOUTH TWICE A DAY WITH MEALS, Disp: 180 tablet, Rfl: 1  •  clopidogrel (PLAVIX) 75 MG tablet, Take 1 tablet by mouth Daily., Disp: 30 tablet, Rfl: 11  •  empagliflozin (Jardiance) 10 MG tablet tablet, Take 1 tablet by mouth Daily., Disp: 30 tablet, Rfl: 0  •  lisinopril (PRINIVIL,ZESTRIL) 2.5 MG tablet, Take 1 tablet by mouth Daily., Disp: 90 tablet, Rfl: 3  •  metFORMIN (GLUCOPHAGE) 500 MG tablet, TAKE ONE TABLET BY MOUTH TWICE A DAY, Disp: 180 tablet, Rfl: 1  •  methylPREDNISolone (MEDROL) 4 MG dose pack, , Disp: , Rfl:   •  neomycin-polymyxin-dexamethamethasone (POLYDEX) 3.5-06203-3.1 ointment ophthalmic ointment, , Disp: , Rfl:   •  nitroglycerin (NITROSTAT) 0.4 MG SL tablet, Place 1 tablet under the tongue Every 5 (Five) Minutes As Needed for Chest Pain (1-3 doses for chest pain). Take no more than 3 doses in 15 minutes., Disp: 25 tablet, Rfl: 3        Objective:     Vitals:    07/19/22 1128   BP: 116/78   BP Location: Left arm   Patient Position: Sitting   Pulse: 73   Weight: 72.5 kg (159 lb 12.8 oz)   Height: 165.1 cm (65\")     Body mass index is 26.59 kg/m².    PHYSICAL EXAM:    Vitals Reviewed.   General Appearance: No acute distress, well developed and well nourished.    Eyes: Conjunctiva and lids: No erythema, swelling, or discharge. Sclera " non-icteric. Glasses.   HENT: Atraumatic, normocephalic. External eyes, ears, and nose normal. No hearing loss noted. Mucous membranes normal. Lips not cyanotic. Neck supple with no tenderness. Wearing mask.   Respiratory: No signs of respiratory distress. Respiration rhythm and depth normal.   Clear to auscultation. No rales, crackles, rhonchi, or wheezing auscultated.   Cardiovascular:  Jugular Venous Pressure: Normal  Heart Rate and Rhythm: Normal, Heart Sounds: Normal S1 and S2. No S3 or S4 noted.  Murmurs: No murmurs noted. No rubs, thrills, or gallops.   Lower Extremities: No edema noted.  Gastrointestinal:  Abdomen soft, non-distended, non-tender.    Musculoskeletal: Normal movement of extremities.  Skin and Nails: General appearance normal. No pallor, cyanosis, diaphoresis. Skin temperature normal. No clubbing of fingernails.   Psychiatric: Patient alert and oriented to person, place, and time. Speech and behavior appropriate. Normal mood and affect.       ECG 12 Lead    Date/Time: 7/19/2022 11:29 AM  Performed by: Maggie Thornton APRN  Authorized by: Maggie Thornton APRN   Comparison: compared with previous ECG from 8/3/2021  Comparison to previous ECG: Normal sinus rhythm, T wave inversions in lead I, aVL, V5, and V6  Rhythm: sinus rhythm  Rate: normal  BPM: 73  Conduction: conduction normal  ST Segments: ST segments normal  T Waves: T waves normal  QRS axis: normal    Clinical impression: normal ECG              Assessment:       Diagnosis Plan   1. Coronary artery disease involving native coronary artery of native heart with angina pectoris (HCC)     2. Ischemic cardiomyopathy  Adult Transthoracic Echo Complete W/ Cont if Necessary Per Protocol   3. Primary hypertension     4. Mixed hyperlipidemia            Plan:       1.  Coronary Artery Disease: History of non-STEMI and stent placement to mid LAD in April 2018.  History of STEMI and drug-eluting stent placement to the distal OM1 in July 2021.  She  recently has been under an increased amount of stress over the past 6 weeks and had intermittent chest pain not as severe as her past anginal symptoms.  I offered a stress test.  She thinks it may be related to the increased personal stress.  I recommended increasing the carvedilol to 6.25 mg twice per day.  If her symptoms persist, we will need to proceed with myocardial perfusion study versus cardiac catheterization.  If she has any worsening symptoms, I recommended she call our office or present to the emergency department immediately.  Continue DAPT at this time.    2.  Ischemic Cardiomyopathy: LVEF 42% after STEMI in July 2021.  Euvolemic today.  Continue empagliflozin, lisinopril and carvedilol for goal-directed medical therapy.  Repeat echocardiogram for reassessment of her EF and to look for any wall motion abnormalities.  I did not order a stress echo because she is unable to walk on the treadmill.    3.  Hypertension: Blood pressure excellent today.    4.  Hyperlipidemia: Continue atorvastatin.    5.  Further recommendations to follow after testing is completed.  I recommended a 2-week follow-up visit with me.    ADDENDUM 8/1/2022:  · Echocardiogram showed normalized EF. Mild LVH and grade I diastolic dysfunction. Severe aortic calcification and moderate mitral calcification.  · I recently saw patient in the office for chest pain.  Her carvedilol was increased to 6.25 mg twice per day.  Blood pressure today at PCP office was 110/60 and heart rate 72.  She denies any further chest pain.  · We decided we will cancel the appointment with me on 8/5.  Reschedule to see Dr. Bansal in the next 2 to 3 months.  If she has any recurrent chest pain or other symptoms of concern, I asked her to call the office.  She verbalized understanding.      As always, it has been a pleasure to participate in your patient's care. Thank you.       Sincerely,         LILY Bowen  [facility]  Cardiology      · Dictated utilizing Dragon Dictation  · COVID-19 Precautions - Patient was compliant in wearing a mask. When I saw the patient, I used appropriate personal protective equipment (PPE) including mask and eye shield (standard procedure).  Additionally, I used gown and gloves if indicated.  Hand hygiene was completed before and after seeing the patient.  · I spent 32 minutes reviewing her medical records/testing/previous office notes/labs, face-to-face interaction with patient, physical examination, formulating the plan of care, and discussion of plan of care with patient.

## 2022-07-28 ENCOUNTER — HOSPITAL ENCOUNTER (OUTPATIENT)
Dept: CARDIOLOGY | Facility: HOSPITAL | Age: 72
Discharge: HOME | End: 2022-07-28
Admitting: NURSE PRACTITIONER

## 2022-07-28 VITALS
HEART RATE: 58 BPM | DIASTOLIC BLOOD PRESSURE: 68 MMHG | HEIGHT: 65 IN | BODY MASS INDEX: 26.49 KG/M2 | WEIGHT: 159 LBS | OXYGEN SATURATION: 94 % | SYSTOLIC BLOOD PRESSURE: 118 MMHG

## 2022-07-28 DIAGNOSIS — I25.5 ISCHEMIC CARDIOMYOPATHY: ICD-10-CM

## 2022-07-28 LAB
AORTIC ARCH: 2.6 CM
BH CV ECHO MEAS - ACS: 1.42 CM
BH CV ECHO MEAS - AO MAX PG: 8.9 MMHG
BH CV ECHO MEAS - AO MEAN PG: 4.9 MMHG
BH CV ECHO MEAS - AO ROOT DIAM: 2.9 CM
BH CV ECHO MEAS - AO V2 MAX: 149.3 CM/SEC
BH CV ECHO MEAS - AO V2 VTI: 34.2 CM
BH CV ECHO MEAS - AVA(I,D): 1.92 CM2
BH CV ECHO MEAS - EDV(CUBED): 74 ML
BH CV ECHO MEAS - EDV(MOD-SP2): 82 ML
BH CV ECHO MEAS - EDV(MOD-SP4): 95 ML
BH CV ECHO MEAS - EF(MOD-BP): 64.3 %
BH CV ECHO MEAS - EF(MOD-SP2): 63.4 %
BH CV ECHO MEAS - EF(MOD-SP4): 63.2 %
BH CV ECHO MEAS - ESV(CUBED): 30.2 ML
BH CV ECHO MEAS - ESV(MOD-SP2): 30 ML
BH CV ECHO MEAS - ESV(MOD-SP4): 35 ML
BH CV ECHO MEAS - FS: 25.8 %
BH CV ECHO MEAS - IVS/LVPW: 1.02 CM
BH CV ECHO MEAS - IVSD: 1.13 CM
BH CV ECHO MEAS - LAT PEAK E' VEL: 6.5 CM/SEC
BH CV ECHO MEAS - LV DIASTOLIC VOL/BSA (35-75): 52.9 CM2
BH CV ECHO MEAS - LV MASS(C)D: 160.1 GRAMS
BH CV ECHO MEAS - LV MAX PG: 5.2 MMHG
BH CV ECHO MEAS - LV MEAN PG: 2.49 MMHG
BH CV ECHO MEAS - LV SYSTOLIC VOL/BSA (12-30): 19.5 CM2
BH CV ECHO MEAS - LV V1 MAX: 113.5 CM/SEC
BH CV ECHO MEAS - LV V1 VTI: 22.2 CM
BH CV ECHO MEAS - LVIDD: 4.2 CM
BH CV ECHO MEAS - LVIDS: 3.1 CM
BH CV ECHO MEAS - LVOT AREA: 3 CM2
BH CV ECHO MEAS - LVOT DIAM: 1.95 CM
BH CV ECHO MEAS - LVPWD: 1.1 CM
BH CV ECHO MEAS - MED PEAK E' VEL: 5.7 CM/SEC
BH CV ECHO MEAS - MR MAX PG: 66.3 MMHG
BH CV ECHO MEAS - MR MAX VEL: 407.2 CM/SEC
BH CV ECHO MEAS - MV A DUR: 0.16 SEC
BH CV ECHO MEAS - MV A MAX VEL: 111.8 CM/SEC
BH CV ECHO MEAS - MV DEC SLOPE: 249.5 CM/SEC2
BH CV ECHO MEAS - MV DEC TIME: 0.28 MSEC
BH CV ECHO MEAS - MV E MAX VEL: 72 CM/SEC
BH CV ECHO MEAS - MV E/A: 0.64
BH CV ECHO MEAS - MV MAX PG: 4.7 MMHG
BH CV ECHO MEAS - MV MEAN PG: 1.47 MMHG
BH CV ECHO MEAS - MV P1/2T: 86 MSEC
BH CV ECHO MEAS - MV V2 VTI: 27.2 CM
BH CV ECHO MEAS - MVA(P1/2T): 2.6 CM2
BH CV ECHO MEAS - MVA(VTI): 2.42 CM2
BH CV ECHO MEAS - PA ACC TIME: 0.08 SEC
BH CV ECHO MEAS - PA PR(ACCEL): 43.3 MMHG
BH CV ECHO MEAS - PA V2 MAX: 97.7 CM/SEC
BH CV ECHO MEAS - PULM A REVS DUR: 0.21 SEC
BH CV ECHO MEAS - PULM A REVS VEL: 63.4 CM/SEC
BH CV ECHO MEAS - PULM DIAS VEL: 29.1 CM/SEC
BH CV ECHO MEAS - PULM S/D: 2.06
BH CV ECHO MEAS - PULM SYS VEL: 60 CM/SEC
BH CV ECHO MEAS - QP/QS: 0.63
BH CV ECHO MEAS - RAP SYSTOLE: 3 MMHG
BH CV ECHO MEAS - RV MAX PG: 3.1 MMHG
BH CV ECHO MEAS - RV V1 MAX: 87.8 CM/SEC
BH CV ECHO MEAS - RV V1 VTI: 20.7 CM
BH CV ECHO MEAS - RVOT DIAM: 1.59 CM
BH CV ECHO MEAS - SI(MOD-SP2): 29 ML/M2
BH CV ECHO MEAS - SI(MOD-SP4): 33.4 ML/M2
BH CV ECHO MEAS - SUP REN AO DIAM: 2.5 CM
BH CV ECHO MEAS - SV(LVOT): 65.9 ML
BH CV ECHO MEAS - SV(MOD-SP2): 52 ML
BH CV ECHO MEAS - SV(MOD-SP4): 60 ML
BH CV ECHO MEAS - SV(RVOT): 41.2 ML
BH CV ECHO MEAS - TAPSE (>1.6): 1.87 CM
BH CV ECHO MEASUREMENTS AVERAGE E/E' RATIO: 11.8
BH CV XLRA - RV BASE: 3.1 CM
BH CV XLRA - RV LENGTH: 6.4 CM
BH CV XLRA - RV MID: 2.9 CM
BH CV XLRA - TDI S': 11.3 CM/SEC
LEFT ATRIUM VOLUME INDEX: 16.3 ML/M2
LV EF 2D ECHO EST: 64 %
MAXIMAL PREDICTED HEART RATE: 147 BPM
SINUS: 2.6 CM
STJ: 1.8 CM
STRESS TARGET HR: 125 BPM

## 2022-07-28 PROCEDURE — 93306 TTE W/DOPPLER COMPLETE: CPT

## 2022-07-28 PROCEDURE — 93306 TTE W/DOPPLER COMPLETE: CPT | Performed by: INTERNAL MEDICINE

## 2022-07-28 PROCEDURE — 25010000002 PERFLUTREN (DEFINITY) 8.476 MG IN SODIUM CHLORIDE (PF) 0.9 % 10 ML INJECTION: Performed by: NURSE PRACTITIONER

## 2022-07-28 RX ADMIN — PERFLUTREN 3 ML: 6.52 INJECTION, SUSPENSION INTRAVENOUS at 14:35

## 2022-07-30 NOTE — PROGRESS NOTES
Reviewed. Normalized EF. Mild LVH and grade I diastolic dysfunction. Severe aortic calcification and moderate mitral calcification. I will call pt next week.

## 2022-08-01 ENCOUNTER — OFFICE VISIT (OUTPATIENT)
Dept: INTERNAL MEDICINE | Facility: CLINIC | Age: 72
End: 2022-08-01

## 2022-08-01 ENCOUNTER — TELEPHONE (OUTPATIENT)
Dept: CARDIOLOGY | Facility: CLINIC | Age: 72
End: 2022-08-01

## 2022-08-01 VITALS
TEMPERATURE: 96.9 F | OXYGEN SATURATION: 98 % | HEIGHT: 65 IN | HEART RATE: 72 BPM | BODY MASS INDEX: 26.49 KG/M2 | DIASTOLIC BLOOD PRESSURE: 60 MMHG | SYSTOLIC BLOOD PRESSURE: 110 MMHG | RESPIRATION RATE: 16 BRPM | WEIGHT: 159 LBS

## 2022-08-01 DIAGNOSIS — E55.9 VITAMIN D DEFICIENCY: ICD-10-CM

## 2022-08-01 DIAGNOSIS — F17.218 NICOTINE DEPENDENCE, CIGARETTES, WITH OTHER NICOTINE-INDUCED DISORDERS: ICD-10-CM

## 2022-08-01 DIAGNOSIS — I25.5 ISCHEMIC CARDIOMYOPATHY: ICD-10-CM

## 2022-08-01 DIAGNOSIS — Z78.0 POSTMENOPAUSAL: ICD-10-CM

## 2022-08-01 DIAGNOSIS — E78.2 MIXED HYPERLIPIDEMIA: ICD-10-CM

## 2022-08-01 DIAGNOSIS — E11.59 TYPE 2 DIABETES MELLITUS WITH OTHER CIRCULATORY COMPLICATION, WITHOUT LONG-TERM CURRENT USE OF INSULIN: Primary | ICD-10-CM

## 2022-08-01 DIAGNOSIS — I25.119 CORONARY ARTERY DISEASE INVOLVING NATIVE CORONARY ARTERY OF NATIVE HEART WITH ANGINA PECTORIS: ICD-10-CM

## 2022-08-01 DIAGNOSIS — R05.3 CHRONIC COUGH: ICD-10-CM

## 2022-08-01 DIAGNOSIS — F17.209 NICOTINE DEPENDENCE WITH NICOTINE-INDUCED DISORDER, UNSPECIFIED NICOTINE PRODUCT TYPE: ICD-10-CM

## 2022-08-01 PROCEDURE — 1159F MED LIST DOCD IN RCRD: CPT | Performed by: INTERNAL MEDICINE

## 2022-08-01 PROCEDURE — 96160 PT-FOCUSED HLTH RISK ASSMT: CPT | Performed by: INTERNAL MEDICINE

## 2022-08-01 PROCEDURE — 1170F FXNL STATUS ASSESSED: CPT | Performed by: INTERNAL MEDICINE

## 2022-08-01 PROCEDURE — G0439 PPPS, SUBSEQ VISIT: HCPCS | Performed by: INTERNAL MEDICINE

## 2022-08-01 RX ORDER — LOSARTAN POTASSIUM 25 MG/1
12.5 TABLET ORAL DAILY
Qty: 90 TABLET | Refills: 2 | Status: SHIPPED | OUTPATIENT
Start: 2022-08-01

## 2022-08-01 RX ORDER — CARVEDILOL 6.25 MG/1
6.25 TABLET ORAL 2 TIMES DAILY
Qty: 180 TABLET | Refills: 3 | Status: SHIPPED | OUTPATIENT
Start: 2022-08-01

## 2022-08-01 NOTE — TELEPHONE ENCOUNTER
Echocardiogram showed normalized EF. Mild LVH and grade I diastolic dysfunction. Severe aortic calcification and moderate mitral calcification.    I recently saw patient in the office for chest pain.  Her carvedilol was increased to 6.25 mg twice per day.  Blood pressure today at PCP office was 110/60 and heart rate 72.  She denies any further chest pain.    We decided we will cancel the appointment with me on 8/5.  Reschedule to see Dr. Bansal in the next 2 to 3 months.  If she has any recurrent chest pain or other symptoms of concern, I asked her to call the office.  She verbalized understanding.

## 2022-08-01 NOTE — PROGRESS NOTES
The ABCs of the Annual Wellness Visit  Subsequent Medicare Wellness Visit    Chief Complaint   Patient presents with   • Medicare Wellness-subsequent      Subjective    History of Present Illness:  Tonya Mccall is a 72 y.o. female who presents for a Subsequent Medicare Wellness Visit.    The following portions of the patient's history were reviewed and   updated as appropriate: allergies, current medications, past family history, past medical history, past social history, past surgical history and problem list.    Compared to one year ago, the patient feels her physical   health is the same.    Compared to one year ago, the patient feels her mental   health is the same.    Recent Hospitalizations:  She was not admitted to the hospital during the last year.       Current Medical Providers:  Patient Care Team:  Raymond Simon MD (Tony) as PCP - General (Internal Medicine)  Ac Bansal III, MD as Consulting Physician (Cardiology)    Outpatient Medications Prior to Visit   Medication Sig Dispense Refill   • aspirin 81 MG tablet Take 81 mg by mouth Daily.     • atorvastatin (LIPITOR) 40 MG tablet Take 1 tablet by mouth Every Night. 90 tablet 3   • carvedilol (COREG) 3.125 MG tablet Take 2 tablets by mouth 2 (Two) Times a Day With Meals. 120 tablet 0   • clopidogrel (PLAVIX) 75 MG tablet Take 1 tablet by mouth Daily. 30 tablet 11   • empagliflozin (Jardiance) 10 MG tablet tablet Take 1 tablet by mouth Daily. 30 tablet 0   • metFORMIN (GLUCOPHAGE) 500 MG tablet TAKE ONE TABLET BY MOUTH TWICE A  tablet 1   • nitroglycerin (NITROSTAT) 0.4 MG SL tablet Place 1 tablet under the tongue Every 5 (Five) Minutes As Needed for Chest Pain (1-3 doses for chest pain). Take no more than 3 doses in 15 minutes. 25 tablet 3   • lisinopril (PRINIVIL,ZESTRIL) 2.5 MG tablet Take 1 tablet by mouth Daily. 90 tablet 3   • methylPREDNISolone (MEDROL) 4 MG dose pack      • neomycin-polymyxin-dexamethamethasone (POLYDEX)  "[id number] ointment ophthalmic ointment        No facility-administered medications prior to visit.       No opioid medication identified on active medication list. I have reviewed chart for other potential  high risk medication/s and harmful drug interactions in the elderly.          Aspirin is on active medication list. Aspirin use is indicated based on review of current medical condition/s. Pros and cons of this therapy have been discussed today. Benefits of this medication outweigh potential harm.  Patient has been encouraged to continue taking this medication.  .      Patient Active Problem List   Diagnosis   • Trichomonas vaginitis   • Ischemic cardiomyopathy   • Mixed hyperlipidemia   • Type 2 diabetes mellitus with circulatory disorder (HCC)   • Coronary artery disease involving native coronary artery of native heart with angina pectoris (HCC)   • COPD (chronic obstructive pulmonary disease) ([facility])   • GERD (gastroesophageal reflux disease)   • Arthritis of left hip   • Osteoarthritis of left hip   • Aortic valve sclerosis   • Primary hypertension     Advance Care Planning  Advance Directive is not on file.  ACP discussion was held with the patient during this visit. Patient has an advance directive (not in EMR), copy requested.          Objective    Vitals:    08/01/22 0930   BP: 110/60   BP Location: Left arm   Patient Position: Sitting   Cuff Size: Large Adult   Pulse: 72   Resp: 16   Temp: 96.9 °F (36.1 °C)   TempSrc: Temporal   SpO2: 98%   Weight: 72.1 kg (159 lb)   Height: 165.1 cm (65\")     Estimated body mass index is 26.46 kg/m² as calculated from the following:    Height as of this encounter: 165.1 cm (65\").    Weight as of this encounter: 72.1 kg (159 lb).    Does the patient have evidence of cognitive impairment? No    Physical Exam  Vitals and nursing note reviewed.   Constitutional:       General: She is not in acute distress.     Appearance: Normal appearance. She is not ill-appearing, " toxic-appearing or diaphoretic.   HENT:      Head: Normocephalic and atraumatic.      Nose: Nose normal.      Mouth/Throat:      Mouth: Mucous membranes are moist.      Pharynx: Oropharynx is clear.   Eyes:      Conjunctiva/sclera: Conjunctivae normal.      Pupils: Pupils are equal, round, and reactive to light.   Cardiovascular:      Rate and Rhythm: Normal rate and regular rhythm.      Pulses: Normal pulses.      Heart sounds: Normal heart sounds. No murmur heard.    No friction rub. No gallop.   Pulmonary:      Effort: Pulmonary effort is normal.      Breath sounds: Normal breath sounds. No stridor. No wheezing, rhonchi or rales.   Abdominal:      General: Abdomen is flat. Bowel sounds are normal.      Palpations: Abdomen is soft. There is no mass.      Tenderness: There is no abdominal tenderness. There is no guarding or rebound.   Musculoskeletal:      Cervical back: Neck supple.   Skin:     General: Skin is warm and dry.      Capillary Refill: Capillary refill takes 2 to 3 seconds.   Neurological:      General: No focal deficit present.      Mental Status: She is alert and oriented to person, place, and time.   Psychiatric:         Mood and Affect: Mood normal.         Thought Content: Thought content normal.                 HEALTH RISK ASSESSMENT    Smoking Status:  Social History     Tobacco Use   Smoking Status Former Smoker   • Packs/day: 1.00   • Types: Cigarettes   • Quit date: 2021   • Years since quittin.0   Smokeless Tobacco Never Used   Tobacco Comment    caffeine use- tea     Alcohol Consumption:  Social History     Substance and Sexual Activity   Alcohol Use No     Fall Risk Screen:    STEADI Fall Risk Assessment was completed, and patient is at LOW risk for falls.Assessment completed on:2022    Depression Screening:  PHQ-2/PHQ-9 Depression Screening 2022   Little Interest or Pleasure in Doing Things 0-->not at all   Feeling Down, Depressed or Hopeless 0-->not at all   PHQ-9: Brief  Depression Severity Measure Score 0       Health Habits and Functional and Cognitive Screening:  Functional & Cognitive Status 8/1/2022   Do you have difficulty preparing food and eating? No   Do you have difficulty bathing yourself, getting dressed or grooming yourself? No   Do you have difficulty using the toilet? No   Do you have difficulty moving around from place to place? No   Do you have trouble with steps or getting out of a bed or a chair? No   Current Diet Unhealthy Diet   Dental Exam Not up to date   Eye Exam Up to date   Exercise (times per week) 0 times per week   Current Exercises Include No Regular Exercise   Do you need help using the phone?  No   Are you deaf or do you have serious difficulty hearing?  No   Do you need help with transportation? No   Do you need help shopping? No   Do you need help preparing meals?  No   Do you need help with housework?  No   Do you need help with laundry? No   Do you need help taking your medications? No   Do you need help managing money? No   Do you ever drive or ride in a car without wearing a seat belt? Yes   Have you felt unusual stress, anger or loneliness in the last month? Yes   Who do you live with? Spouse   If you need help, do you have trouble finding someone available to you? No   Have you been bothered in the last four weeks by sexual problems? No   Do you have difficulty concentrating, remembering or making decisions? No       Age-appropriate Screening Schedule:  Refer to the list below for future screening recommendations based on patient's age, sex and/or medical conditions. Orders for these recommended tests are listed in the plan section. The patient has been provided with a written plan.    Health Maintenance   Topic Date Due   • ZOSTER VACCINE (1 of 2) Never done   • DXA SCAN  08/02/2018   • MAMMOGRAM  02/16/2019   • HEMOGLOBIN A1C  09/30/2022   • INFLUENZA VACCINE  10/01/2022   • DIABETIC EYE EXAM  12/14/2022   • DIABETIC FOOT EXAM  03/30/2023    • LIPID PANEL  03/30/2023   • URINE MICROALBUMIN  03/30/2023   • TDAP/TD VACCINES (2 - Td or Tdap) 06/01/2024              Assessment & Plan   CMS Preventative Services Quick Reference  Risk Factors Identified During Encounter  Immunizations Discussed/Encouraged (specific Immunizations; Shingrix and COVID19  The above risks/problems have been discussed with the patient.  Follow up actions/plans if indicated are seen below in the Assessment/Plan Section.  Pertinent information has been shared with the patient in the After Visit Summary.    Diagnoses and all orders for this visit:    1. Type 2 diabetes mellitus with other circulatory complication, without long-term current use of insulin (HCC) (Primary)  -     Hepatitis C Antibody  -     Urinalysis With Microscopic - Urine, Clean Catch  -     CBC & Differential  -     Comprehensive Metabolic Panel  -     Lipid Panel With / Chol / HDL Ratio  -     TSH  -     Hemoglobin A1c  -     Vitamin D 25 Hydroxy    2. Mixed hyperlipidemia  -     Hepatitis C Antibody  -     Urinalysis With Microscopic - Urine, Clean Catch  -     CBC & Differential  -     Comprehensive Metabolic Panel  -     Lipid Panel With / Chol / HDL Ratio  -     TSH  -     Hemoglobin A1c  -     Vitamin D 25 Hydroxy    3. Ischemic cardiomyopathy    4. Coronary artery disease involving native coronary artery of native heart with angina pectoris (HCC)  Comments:  [facility] CIRC 2021  Orders:  -     Hepatitis C Antibody  -     Urinalysis With Microscopic - Urine, Clean Catch  -     CBC & Differential  -     Comprehensive Metabolic Panel  -     Lipid Panel With / Chol / HDL Ratio  -     TSH  -     Hemoglobin A1c  -     Vitamin D 25 Hydroxy    5. Vitamin D deficiency  -     Hepatitis C Antibody  -     Urinalysis With Microscopic - Urine, Clean Catch  -     CBC & Differential  -     Comprehensive Metabolic Panel  -     Lipid Panel With / Chol / HDL Ratio  -     TSH  -     Hemoglobin A1c  -     Vitamin D 25  Hydroxy    6. Postmenopausal  -      CT Chest Low Dose Cancer Screening WO; Future  -     DEXA Bone Density Axial; Future    7. Nicotine dependence with nicotine-induced disorder, unspecified nicotine product type  -      CT Chest Low Dose Cancer Screening WO; Future  -     DEXA Bone Density Axial; Future    8. Nicotine dependence, cigarettes, with other nicotine-induced disorders   -      CT Chest Low Dose Cancer Screening WO; Future    9. Chronic cough    Other orders  -     losartan (Cozaar) 25 MG tablet; Take 0.5 tablets by mouth Daily.  Dispense: 90 tablet; Refill: 2        Follow Up:   Return in about 4 weeks (around 8/29/2022).     An After Visit Summary and PPPS were made available to the patient.          I spent 20 minutes caring for Tonya on this date of service. This time includes time spent by me in the following activities:preparing for the visit, reviewing tests and obtaining and/or reviewing a separately obtained history       She also complains of cough over the past 4 months nonproductive.  She is on an ACE inhibitor but has been on for quite some time.  Long smoking history and needs lung cancer screening anyway.  CT scan of the chest.  Discontinue the lisinopril and change to losartan 12.5 mg daily.  Follow-up in 4 weeks or sooner if problems.  Schedule DEXA scan.  She wants to do her mammograms through her GYN office.  She is going to check on shingles coverage at the pharmacy  She has the Cologuard but has not completed it yet

## 2022-08-02 LAB
25(OH)D3+25(OH)D2 SERPL-MCNC: 31.4 NG/ML (ref 30–100)
ALBUMIN SERPL-MCNC: 4.3 G/DL (ref 3.7–4.7)
ALBUMIN/GLOB SERPL: 1.6 {RATIO} (ref 1.2–2.2)
ALP SERPL-CCNC: 66 IU/L (ref 44–121)
ALT SERPL-CCNC: 18 IU/L (ref 0–32)
APPEARANCE UR: ABNORMAL
AST SERPL-CCNC: 21 IU/L (ref 0–40)
BACTERIA #/AREA URNS HPF: ABNORMAL /[HPF]
BASOPHILS # BLD AUTO: 0.1 X10E3/UL (ref 0–0.2)
BASOPHILS NFR BLD AUTO: 1 %
BILIRUB SERPL-MCNC: 0.5 MG/DL (ref 0–1.2)
BILIRUB UR QL STRIP: NEGATIVE
BUN SERPL-MCNC: 11 MG/DL (ref 8–27)
BUN/CREAT SERPL: 15 (ref 12–28)
CALCIUM SERPL-MCNC: 9.2 MG/DL (ref 8.7–10.3)
CASTS URNS QL MICRO: ABNORMAL /LPF
CHLORIDE SERPL-SCNC: 105 MMOL/L (ref 96–106)
CHOLEST SERPL-MCNC: 146 MG/DL (ref 100–199)
CHOLEST/HDLC SERPL: 4.1 RATIO (ref 0–4.4)
CO2 SERPL-SCNC: 22 MMOL/L (ref 20–29)
COLOR UR: YELLOW
CREAT SERPL-MCNC: 0.72 MG/DL (ref 0.57–1)
EGFRCR SERPLBLD CKD-EPI 2021: 88 ML/MIN/1.73
EOSINOPHIL # BLD AUTO: 0.1 X10E3/UL (ref 0–0.4)
EOSINOPHIL NFR BLD AUTO: 1 %
EPI CELLS #/AREA URNS HPF: >10 /HPF (ref 0–10)
ERYTHROCYTE [DISTWIDTH] IN BLOOD BY AUTOMATED COUNT: 14.6 % (ref 11.7–15.4)
GLOBULIN SER CALC-MCNC: 2.7 G/DL (ref 1.5–4.5)
GLUCOSE SERPL-MCNC: 113 MG/DL (ref 65–99)
GLUCOSE UR QL STRIP: ABNORMAL
HBA1C MFR BLD: 6.3 % (ref 4.8–5.6)
HCT VFR BLD AUTO: 44.5 % (ref 34–46.6)
HCV AB S/CO SERPL IA: 0.1 S/CO RATIO (ref 0–0.9)
HDLC SERPL-MCNC: 36 MG/DL
HGB BLD-MCNC: 14.1 G/DL (ref 11.1–15.9)
HGB UR QL STRIP: NEGATIVE
IMM GRANULOCYTES # BLD AUTO: 0 X10E3/UL (ref 0–0.1)
IMM GRANULOCYTES NFR BLD AUTO: 0 %
KETONES UR QL STRIP: NEGATIVE
LDLC SERPL CALC-MCNC: 74 MG/DL (ref 0–99)
LEUKOCYTE ESTERASE UR QL STRIP: ABNORMAL
LYMPHOCYTES # BLD AUTO: 2.9 X10E3/UL (ref 0.7–3.1)
LYMPHOCYTES NFR BLD AUTO: 28 %
MCH RBC QN AUTO: 28.2 PG (ref 26.6–33)
MCHC RBC AUTO-ENTMCNC: 31.7 G/DL (ref 31.5–35.7)
MCV RBC AUTO: 89 FL (ref 79–97)
MICRO URNS: ABNORMAL
MONOCYTES # BLD AUTO: 0.7 X10E3/UL (ref 0.1–0.9)
MONOCYTES NFR BLD AUTO: 6 %
NEUTROPHILS # BLD AUTO: 6.8 X10E3/UL (ref 1.4–7)
NEUTROPHILS NFR BLD AUTO: 64 %
NITRITE UR QL STRIP: NEGATIVE
PH UR STRIP: 5.5 [PH] (ref 5–7.5)
PLATELET # BLD AUTO: 264 X10E3/UL (ref 150–450)
POTASSIUM SERPL-SCNC: 4.1 MMOL/L (ref 3.5–5.2)
PROT SERPL-MCNC: 7 G/DL (ref 6–8.5)
PROT UR QL STRIP: NEGATIVE
RBC # BLD AUTO: 5 X10E6/UL (ref 3.77–5.28)
RBC #/AREA URNS HPF: ABNORMAL /HPF (ref 0–2)
SODIUM SERPL-SCNC: 143 MMOL/L (ref 134–144)
SP GR UR STRIP: >=1.03 (ref 1–1.03)
TRIGL SERPL-MCNC: 213 MG/DL (ref 0–149)
TSH SERPL DL<=0.005 MIU/L-ACNC: 1.2 UIU/ML (ref 0.45–4.5)
UROBILINOGEN UR STRIP-MCNC: 0.2 MG/DL (ref 0.2–1)
VLDLC SERPL CALC-MCNC: 36 MG/DL (ref 5–40)
WBC # BLD AUTO: 10.6 X10E3/UL (ref 3.4–10.8)
WBC #/AREA URNS HPF: ABNORMAL /HPF (ref 0–5)
YEAST #/AREA URNS HPF: PRESENT /[HPF]

## 2022-08-04 DIAGNOSIS — Z12.31 ENCOUNTER FOR SCREENING MAMMOGRAM FOR MALIGNANT NEOPLASM OF BREAST: Primary | ICD-10-CM

## 2022-08-11 RX ORDER — CLOPIDOGREL BISULFATE 75 MG/1
75 TABLET ORAL DAILY
Qty: 90 TABLET | Refills: 3 | Status: SHIPPED | OUTPATIENT
Start: 2022-08-11

## 2022-08-11 NOTE — TELEPHONE ENCOUNTER
Rx Refill Note  Requested Prescriptions     Pending Prescriptions Disp Refills   • Jardiance 10 MG tablet tablet [Pharmacy Med Name: JARDIANCE 10 MG TABLET] 30 tablet 0     Sig: TAKE ONE TABLET BY MOUTH DAILY      Last office visit with prescribing clinician: Visit date not found      Next office visit with prescribing clinician: Visit date not found            Maribell Guerrero MA  08/11/22, 09:54 EDT

## 2022-08-15 RX ORDER — EMPAGLIFLOZIN 10 MG/1
TABLET, FILM COATED ORAL
Qty: 30 TABLET | Refills: 12 | Status: SHIPPED | OUTPATIENT
Start: 2022-08-15

## 2022-08-17 ENCOUNTER — HOSPITAL ENCOUNTER (OUTPATIENT)
Dept: MAMMOGRAPHY | Facility: HOSPITAL | Age: 72
Discharge: HOME | End: 2022-08-17
Admitting: INTERNAL MEDICINE

## 2022-08-17 DIAGNOSIS — Z12.31 ENCOUNTER FOR SCREENING MAMMOGRAM FOR MALIGNANT NEOPLASM OF BREAST: ICD-10-CM

## 2022-08-17 PROCEDURE — 77063 BREAST TOMOSYNTHESIS BI: CPT

## 2022-08-17 PROCEDURE — 77067 SCR MAMMO BI INCL CAD: CPT

## 2022-08-30 ENCOUNTER — OFFICE VISIT (OUTPATIENT)
Dept: INTERNAL MEDICINE | Facility: CLINIC | Age: 72
End: 2022-08-30

## 2022-08-30 VITALS
DIASTOLIC BLOOD PRESSURE: 70 MMHG | SYSTOLIC BLOOD PRESSURE: 130 MMHG | HEART RATE: 80 BPM | WEIGHT: 156 LBS | TEMPERATURE: 96.9 F | HEIGHT: 65 IN | RESPIRATION RATE: 16 BRPM | OXYGEN SATURATION: 98 % | BODY MASS INDEX: 25.99 KG/M2

## 2022-08-30 DIAGNOSIS — R05.3 CHRONIC COUGH: Primary | ICD-10-CM

## 2022-08-30 PROCEDURE — 99213 OFFICE O/P EST LOW 20 MIN: CPT | Performed by: INTERNAL MEDICINE

## 2022-08-30 NOTE — PROGRESS NOTES
"Chief Complaint  Hypertension    Subjective        Tonya Mccall presents to [facility]  History of Present Illness  Here for follow-up on her blood pressure.  There was a misunderstanding and she did not change to the losartan.  We are changing over from the lisinopril to see if it helps with the cough  Objective   Vital Signs:  /70 (BP Location: Left arm, Patient Position: Sitting, Cuff Size: Large Adult)   Pulse 80   Temp 96.9 °F (36.1 °C) (Temporal)   Resp 16   Ht 165.1 cm (65\")   Wt 70.8 kg (156 lb)   SpO2 98%   BMI 25.96 kg/m²   Estimated body mass index is 25.96 kg/m² as calculated from the following:    Height as of this encounter: 165.1 cm (65\").    Weight as of this encounter: 70.8 kg (156 lb).          Physical Exam  Vitals and nursing note reviewed.   Constitutional:       Appearance: Normal appearance.   HENT:      Head: Normocephalic and atraumatic.   Cardiovascular:      Rate and Rhythm: Normal rate and regular rhythm.   Pulmonary:      Effort: Pulmonary effort is normal.      Breath sounds: Normal breath sounds.   Abdominal:      General: Abdomen is flat.      Palpations: Abdomen is soft.   Musculoskeletal:         General: No swelling or deformity.      Cervical back: Neck supple.      Right lower leg: No edema.      Left lower leg: No edema.   Skin:     General: Skin is warm.      Capillary Refill: Capillary refill takes less than 2 seconds.      Findings: No rash.   Neurological:      General: No focal deficit present.      Mental Status: She is alert and oriented to person, place, and time.        Result Review :                Assessment and Plan   Diagnoses and all orders for this visit:    1. Chronic cough (Primary)    Change over to the losartan now and follow-up in about 6 weeks on blood pressure and see if cough resolves .  Continue working on diet and exercise.         Follow Up   Return in about 6 weeks (around " 10/11/2022).  Patient was given instructions and counseling regarding her condition or for health maintenance advice. Please see specific information pulled into the AVS if appropriate.

## 2022-09-26 ENCOUNTER — HOSPITAL ENCOUNTER (OUTPATIENT)
Dept: CT IMAGING | Facility: HOSPITAL | Age: 72
Discharge: HOME | End: 2022-09-26
Admitting: INTERNAL MEDICINE

## 2022-09-26 DIAGNOSIS — Z78.0 POSTMENOPAUSAL: ICD-10-CM

## 2022-09-26 DIAGNOSIS — F17.218 NICOTINE DEPENDENCE, CIGARETTES, WITH OTHER NICOTINE-INDUCED DISORDERS: ICD-10-CM

## 2022-09-26 DIAGNOSIS — F17.209 NICOTINE DEPENDENCE WITH NICOTINE-INDUCED DISORDER, UNSPECIFIED NICOTINE PRODUCT TYPE: ICD-10-CM

## 2022-09-26 PROCEDURE — 71271 CT THORAX LUNG CANCER SCR C-: CPT

## 2022-10-11 ENCOUNTER — OFFICE VISIT (OUTPATIENT)
Dept: INTERNAL MEDICINE | Facility: CLINIC | Age: 72
End: 2022-10-11

## 2022-10-11 VITALS
RESPIRATION RATE: 16 BRPM | OXYGEN SATURATION: 97 % | HEART RATE: 91 BPM | SYSTOLIC BLOOD PRESSURE: 120 MMHG | BODY MASS INDEX: 25.99 KG/M2 | DIASTOLIC BLOOD PRESSURE: 70 MMHG | HEIGHT: 65 IN | WEIGHT: 156 LBS | TEMPERATURE: 96.7 F

## 2022-10-11 DIAGNOSIS — E11.59 TYPE 2 DIABETES MELLITUS WITH OTHER CIRCULATORY COMPLICATION, WITHOUT LONG-TERM CURRENT USE OF INSULIN: ICD-10-CM

## 2022-10-11 DIAGNOSIS — M50.90 CERVICAL NECK PAIN WITH EVIDENCE OF DISC DISEASE: ICD-10-CM

## 2022-10-11 DIAGNOSIS — Z23 ENCOUNTER FOR IMMUNIZATION: Primary | ICD-10-CM

## 2022-10-11 PROCEDURE — 99214 OFFICE O/P EST MOD 30 MIN: CPT | Performed by: INTERNAL MEDICINE

## 2022-10-11 NOTE — PROGRESS NOTES
"Chief Complaint  Follow-up and Cough    Subjective        Tonya Mccall presents to [facility]  History of Present Illness  The cough has resolved off the lisinopril.  She complains of cervical neck tightness and pain.  Is actually gotten a little bit better with some difficulty with range of motion of the neck.  Objective   Vital Signs:  /70   Pulse 91   Temp 96.7 °F (35.9 °C)   Resp 16   Ht 165.1 cm (65\")   Wt 70.8 kg (156 lb)   SpO2 97%   BMI 25.96 kg/m²   Estimated body mass index is 25.96 kg/m² as calculated from the following:    Height as of this encounter: 165.1 cm (65\").    Weight as of this encounter: 70.8 kg (156 lb).          Physical Exam  Vitals and nursing note reviewed.   Constitutional:       Appearance: Normal appearance.   HENT:      Head: Normocephalic and atraumatic.   Eyes:      Pupils: Pupils are equal, round, and reactive to light.   Neck:      Thyroid: No thyroid mass.   Cardiovascular:      Rate and Rhythm: Normal rate and regular rhythm.      Pulses: Normal pulses.   Pulmonary:      Effort: Pulmonary effort is normal.   Abdominal:      General: Abdomen is flat.   Musculoskeletal:      Cervical back: Pain with movement present. No muscular tenderness. Decreased range of motion.   Lymphadenopathy:      Cervical: No cervical adenopathy.   Neurological:      Mental Status: She is alert.      Motor: No weakness, tremor, atrophy or abnormal muscle tone.      Deep Tendon Reflexes:      Reflex Scores:       Tricep reflexes are 2+ on the right side and 2+ on the left side.       Bicep reflexes are 2+ on the right side and 2+ on the left side.       Brachioradialis reflexes are 2+ on the right side and 2+ on the left side.       Result Review :           CT scan reviewed with patient     Assessment and Plan   Diagnoses and all orders for this visit:    1. Encounter for immunization (Primary)  -     Fluzone High-Dose 65+yrs " ([id number])    2. Type 2 diabetes mellitus with other circulatory complication, without long-term current use of insulin (HCC)    3. Cervical neck pain with evidence of disc disease    Other orders  -     metFORMIN (GLUCOPHAGE) 500 MG tablet; Take 1 tablet by mouth 2 (Two) Times a Day.  Dispense: 180 tablet; Refill: 1    ACE inhibitor cough improved.  Tolerating the losartan.  I do not think that is related to her neck pain as she was concerned.  The neck is getting a little bit better.  We reviewed neck exercises.  Follow-up if persistent we can schedule imaging  Reviewed CT of the chest with no suspicious lesions.  Recheck yearly  High-dose flu shot         Follow Up   Return in about 4 months (around 2/11/2023).  Patient was given instructions and counseling regarding her condition or for health maintenance advice. Please see specific information pulled into the AVS if appropriate.

## 2022-10-25 ENCOUNTER — OFFICE VISIT (OUTPATIENT)
Dept: CARDIOLOGY | Facility: CLINIC | Age: 72
End: 2022-10-25

## 2022-10-25 VITALS
WEIGHT: 158 LBS | HEIGHT: 65 IN | DIASTOLIC BLOOD PRESSURE: 80 MMHG | SYSTOLIC BLOOD PRESSURE: 112 MMHG | HEART RATE: 78 BPM | BODY MASS INDEX: 26.33 KG/M2

## 2022-10-25 DIAGNOSIS — I25.10 CORONARY ARTERY DISEASE INVOLVING NATIVE CORONARY ARTERY OF NATIVE HEART WITHOUT ANGINA PECTORIS: ICD-10-CM

## 2022-10-25 DIAGNOSIS — I34.0 NONRHEUMATIC MITRAL VALVE REGURGITATION: ICD-10-CM

## 2022-10-25 DIAGNOSIS — E78.2 MIXED HYPERLIPIDEMIA: Chronic | ICD-10-CM

## 2022-10-25 DIAGNOSIS — E11.59 TYPE 2 DIABETES MELLITUS WITH OTHER CIRCULATORY COMPLICATION, WITHOUT LONG-TERM CURRENT USE OF INSULIN: Chronic | ICD-10-CM

## 2022-10-25 DIAGNOSIS — I25.5 ISCHEMIC CARDIOMYOPATHY: Primary | Chronic | ICD-10-CM

## 2022-10-25 DIAGNOSIS — Z95.5 HISTORY OF CORONARY ARTERY STENT PLACEMENT: Chronic | ICD-10-CM

## 2022-10-25 PROCEDURE — 99214 OFFICE O/P EST MOD 30 MIN: CPT | Performed by: INTERNAL MEDICINE

## 2022-10-25 NOTE — PROGRESS NOTES
Subjective:     Encounter Date:  10/25/22      Patient ID: Tonya Mccall is a 72 y.o. female.    Chief Complaint: CAD  History of Present Illness    Dear Dr. Simon,    I had the pleasure seeing this patient in the office today for follow-up of her cardiac status.  She has a history of coronary artery disease.  She has a history of drug-eluting stent in the mid LAD.    Ever since the increase in her carvedilol she feels much better.  She has not had any chest pain or chest discomfort, no shortness of breath.  No palpitations or tachycardia.  She was seen recently because she was having some mild intermittent discomfort, but this bothers her mainly when she was laying on her left side.  She was a little tachycardic so her carvedilol was increased.    In July 2021, she presented to the [facility], operated by [facility] ED with complaints of chest pain for 4 days.  She was diagnosed with anterior lateral STEMI.  She underwent coronary angiogram with PCI and drug-eluting stent placement to the distal OM1.  Echocardiogram showed a decreased LVEF of 42%.  Jardiance was initiated and she was recommended dual antiplatelet therapy for at least 1 year.       On 4/21/2018 she presented to the emergency room with chest pain and ruled in for a non-STEMI.  She underwent cardiac catheterization on 4/22/2018.  This showed an EF of around 30%, 20% left main, 90% mid LAD, 20-30% distal LAD, 30% diagonal, 50% circumflex, and a 50% mid RCA lesion.  She underwent successful drug-eluting stent placement to the mid LAD lesion.  An echocardiogram obtained the following day showed normalization of her EF at 54% and no significant valvular abnormalities.  Antiplatelet therapy was recommended for one year.    The following portions of the patient's history were reviewed and updated as appropriate: allergies, current medications, past family history, past medical history, past social history, past surgical history and problem list.    Past Medical History:    Diagnosis Date   • Aortic valve sclerosis    • Atelectasis     right   • Breast nodule    • CAD (coronary artery disease)    • CHF (congestive heart failure) ([facility]) 7/21   • COPD (chronic obstructive pulmonary disease) ([facility])    • Dyspareunia in female    • GERD (gastroesophageal reflux disease)    • Herpes genitalis    • Hiatal hernia    • History of Papanicolaou smear of cervix 2013    Pap smears hve all been normal since 2004.  They frequently have shown Trichomonas infections to be present.  2013, normal Pap smear, negative HR-HPV.  Next pap 2016.    • Hormone replacement therapy (postmenopausal)    • HPV in female    • Hyperlipidemia    • Hypertension    • Hypertriglyceridemia    • Ischemic cardiomyopathy    • Non-STEMI (non-ST elevated myocardial infarction) ([facility])    • Ovarian cystadenoma    • Senile (atrophic) vaginitis    • Soft tissue tumor     NECK   • ST elevation myocardial infarction (STEMI) ([facility]) 07/23/2021   • Tobacco abuse    • Trichomonal vaginitis    • Type 2 diabetes mellitus with circulatory disorder ([facility]) 04/23/2018   • Uterine leiomyoma        Past Surgical History:   Procedure Laterality Date   • APPENDECTOMY     • CARDIAC CATHETERIZATION N/A 04/22/2018    Procedure: Left Heart Cath and Cors;  Surgeon: Srikanth Rod MD;  Location: [facility] CATH INVASIVE LOCATION;  Service: Cardiovascular   • CARDIAC CATHETERIZATION N/A 04/22/2018    Procedure: Peripheral angiography;  Surgeon: Srikanth Rod MD;  Location: [facility] CATH INVASIVE LOC[facility];  Service: Cardiovascular   • CARDIAC CATHETERIZATION N/A 04/22/2018    Procedure: Stent DAO coronary;  Surgeon: Srikanth Rod MD;  Location: [facility] CATH INVASIVE LOCATION;  Service: Cardiovascular   • CARDIAC CATHETERIZATION N/A 07/22/2021    Procedure: Left Heart Cath;  Surgeon: Srikanth Rod MD;  Location: [facility] CATH INVASIVE LOCATION;  Service: Cardiovascular;  Laterality: N/A;   • CARDIAC CATHETERIZATION N/A 07/22/2021    Procedure: Coronary  "angiography;  Surgeon: Srikanth Rod MD;  Location: [facility] CATH INVASIVE LOCATION;  Service: Cardiovascular;  Laterality: N/A;   • CARDIAC CATHETERIZATION N/A 07/22/2021    Procedure: Left ventriculography;  Surgeon: Srikanth Rod MD;  Location:  [facility] CATH INVASIVE LOCATION;  Service: Cardiovascular;  Laterality: N/A;   • CARDIAC CATHETERIZATION N/A 07/22/2021    Procedure: Stent DAO coronary;  Surgeon: Srikanth Rod MD;  Location: [facility] CATH INVASIVE LOCATION;  Service: Cardiovascular;  Laterality: N/A;   • CORONARY STENT PLACEMENT  4/22/2018 2nd on July, 2021   • DILATATION AND CURETTAGE     • ESSURE TUBAL LIGATION     • TONSILLECTOMY     • TOTAL ABDOMINAL HYSTERECTOMY Bilateral 2006    ASHLEE, BSO. Pathology showed serous cystadenoma/cystadenofibroma , bilaterally of both tubes with focal paratubal cyst. Severe Acute and Chronic Cervicitis and endocervicitis with reactive chage and focal features suggestive of HPV cystopathic effect. Disordered proliferative phase endometrium. Adenomyosis and Leiomyomas.            Procedures       Objective:     Vitals:    10/25/22 0901   BP: 112/80   Pulse: 78   Weight: 71.7 kg (158 lb)   Height: 165.1 cm (65\")     General Appearance:    Alert, cooperative, in no acute distress   Head:    Normocephalic, without obvious abnormality, atraumatic   Eyes:            Lids and lashes normal, conjunctivae and sclerae normal, no   icterus, no pallor, corneas clear   Ears:    Ears appear intact with no abnormalities noted   Throat:   No oral lesions, no thrush, oral mucosa moist   Neck:   No adenopathy, supple, trachea midline, no thyromegaly, no   carotid bruit, no JVD   Back:     No kyphosis present, no scoliosis present, no skin lesions, erythema or scars, no tenderness to percussion or palpation, range of motion normal   Lungs:     Clear to auscultation,respirations regular, even and unlabored    Heart:    Regular rhythm and normal rate, normal S1 and S2, 1/6 holosystolic " murmur, no gallop, no rub, no click   Chest Wall:    No abnormalities observed   Abdomen:     Normal bowel sounds, no masses, no organomegaly, soft        non-tender, non-distended, no guarding, no rebound  tenderness   Extremities:   Moves all extremities well, no edema, no cyanosis, no redness   Pulses:   Pulses palpable and equal bilaterally. Normal radial, carotid, femoral, dorsalis pedis and posterior tibial pulses bilaterally. Normal abdominal aorta   Skin:  Psychiatric:   No bleeding, bruising or rash    Alert and oriented x 3, normal mood and affect           Lab Review:             Lab Results   Component Value Date    GLUCOSE 113 (H) 08/01/2022    BUN 11 08/01/2022    CREATININE 0.72 08/01/2022    EGFRIFNONA 85 07/23/2021    BCR 15 08/01/2022    K 4.1 08/01/2022    CO2 22 08/01/2022    CALCIUM 9.2 08/01/2022    PROTENTOTREF 7.0 08/01/2022    ALBUMIN 4.3 08/01/2022    LABIL2 1.6 08/01/2022    AST 21 08/01/2022    ALT 18 08/01/2022     CBC    CBC 8/1/22   WBC 10.6   RBC 5.00   Hemoglobin 14.1   Hematocrit 44.5   MCV 89   MCH 28.2   MCHC 31.7   RDW 14.6   Platelets 264           Results for orders placed during the hospital encounter of 07/28/22    Adult Transthoracic Echo Complete W/ Cont if Necessary Per Protocol    Interpretation Summary  · Calculated left ventricular EF = 64.3% Estimated left ventricular EF = 64% Estimated left ventricular EF was in agreement with the calculated left ventricular EF. Left ventricular systolic function is normal. Normal left ventricular cavity size noted. Left ventricular wall thickness is consistent with mild concentric hypertrophy. All left ventricular wall segments contract normally. Left ventricular diastolic function is consistent with (grade I) impaired relaxation.  · No aortic valve regurgitation or stenosis is present. The aortic valve is abnormal in structure. There is severe calcification of the aortic valve.  · Moderate mitral annular calcification is present.  Trace mitral valve regurgitation is present.            Assessment:         No diagnosis found.       Plan:       1. Coronary Artery Disease  Assessment  • The patient has no angina    Plan  • Lifestyle modifications discussed include adhering to a heart healthy diet, avoidance of tobacco products, maintenance of a healthy weight, medication compliance, regular exercise and regular monitoring of cholesterol and blood pressure    Subjective - Objective  • There is a history of past MI  • There has been a previous stent procedure using DAO  • Current antiplatelet therapy includes aspirin 81 mg and clopidogrel 75 mg  • The patient qualifies for cardiac rehabilitation, and has completed a cardiac rehab program      2.  History of non-ST segment elevation MI  3.  Ischemic cardiomyopathy-continue current medical therapy with the carvedilol, lisinopril, and aspirin, recovery of function.  4.  Diabetes mellitus with circulatory complication  5.  Mixed hyperlipidemia-continue lipid-lowering therapy with pravastatin  6.  Mitral regurgitation-moderate mitral regurgitation, continue to follow    Thank you very much for allowing us to participate in the care of this pleasant patient.  Please don't hesitate to call if I can be of assistance in any way.      Current Outpatient Medications:   •  aspirin 81 MG tablet, Take 81 mg by mouth Daily., Disp: , Rfl:   •  atorvastatin (LIPITOR) 40 MG tablet, Take 1 tablet by mouth Every Night., Disp: 90 tablet, Rfl: 3  •  carvedilol (COREG) 6.25 MG tablet, Take 1 tablet by mouth 2 (Two) Times a Day., Disp: 180 tablet, Rfl: 3  •  clopidogrel (PLAVIX) 75 MG tablet, Take 1 tablet by mouth Daily., Disp: 90 tablet, Rfl: 3  •  Jardiance 10 MG tablet tablet, TAKE ONE TABLET BY MOUTH DAILY, Disp: 30 tablet, Rfl: 12  •  losartan (Cozaar) 25 MG tablet, Take 0.5 tablets by mouth Daily., Disp: 90 tablet, Rfl: 2  •  metFORMIN (GLUCOPHAGE) 500 MG tablet, Take 1 tablet by mouth 2 (Two) Times a Day., Disp:  180 tablet, Rfl: 1  •  nitroglycerin (NITROSTAT) 0.4 MG SL tablet, Place 1 tablet under the tongue Every 5 (Five) Minutes As Needed for Chest Pain (1-3 doses for chest pain). Take no more than 3 doses in 15 minutes., Disp: 25 tablet, Rfl: 3

## 2022-11-10 RX ORDER — ATORVASTATIN CALCIUM 40 MG/1
TABLET, FILM COATED ORAL
Qty: 90 TABLET | Refills: 3 | Status: SHIPPED | OUTPATIENT
Start: 2022-11-10

## 2023-02-03 ENCOUNTER — HOSPITAL ENCOUNTER (OUTPATIENT)
Dept: BONE DENSITY | Facility: HOSPITAL | Age: 73
Discharge: HOME | End: 2023-02-03
Admitting: INTERNAL MEDICINE
Payer: MEDICARE

## 2023-02-03 DIAGNOSIS — F17.209 NICOTINE DEPENDENCE WITH NICOTINE-INDUCED DISORDER, UNSPECIFIED NICOTINE PRODUCT TYPE: ICD-10-CM

## 2023-02-03 DIAGNOSIS — Z78.0 POSTMENOPAUSAL: ICD-10-CM

## 2023-02-03 PROCEDURE — 77080 DXA BONE DENSITY AXIAL: CPT

## 2023-02-08 RX ORDER — RISEDRONATE SODIUM 35 MG/1
35 TABLET, FILM COATED ORAL
Qty: 12 TABLET | Refills: 4 | Status: SHIPPED | OUTPATIENT
Start: 2023-02-08

## 2023-02-13 ENCOUNTER — OFFICE VISIT (OUTPATIENT)
Dept: INTERNAL MEDICINE | Facility: CLINIC | Age: 73
End: 2023-02-13
Payer: MEDICARE

## 2023-02-13 VITALS
SYSTOLIC BLOOD PRESSURE: 118 MMHG | WEIGHT: 149 LBS | HEART RATE: 75 BPM | DIASTOLIC BLOOD PRESSURE: 70 MMHG | TEMPERATURE: 96.8 F | BODY MASS INDEX: 24.83 KG/M2 | OXYGEN SATURATION: 99 % | HEIGHT: 65 IN | RESPIRATION RATE: 16 BRPM

## 2023-02-13 DIAGNOSIS — E78.2 MIXED HYPERLIPIDEMIA: ICD-10-CM

## 2023-02-13 DIAGNOSIS — E11.59 TYPE 2 DIABETES MELLITUS WITH OTHER CIRCULATORY COMPLICATION, WITHOUT LONG-TERM CURRENT USE OF INSULIN: Primary | ICD-10-CM

## 2023-02-13 PROCEDURE — 99214 OFFICE O/P EST MOD 30 MIN: CPT | Performed by: INTERNAL MEDICINE

## 2023-02-14 LAB
ALBUMIN SERPL-MCNC: 4.3 G/DL (ref 3.5–5.2)
ALBUMIN/GLOB SERPL: 1.5 G/DL
ALP SERPL-CCNC: 72 U/L (ref 39–117)
ALT SERPL-CCNC: 10 U/L (ref 1–33)
AST SERPL-CCNC: 12 U/L (ref 1–32)
BILIRUB SERPL-MCNC: 0.3 MG/DL (ref 0–1.2)
BUN SERPL-MCNC: 7 MG/DL (ref 8–23)
BUN/CREAT SERPL: 10.3 (ref 7–25)
CALCIUM SERPL-MCNC: 9.4 MG/DL (ref 8.6–10.5)
CHLORIDE SERPL-SCNC: 105 MMOL/L (ref 98–107)
CHOLEST SERPL-MCNC: 136 MG/DL (ref 0–200)
CHOLEST/HDLC SERPL: 3.78 {RATIO}
CO2 SERPL-SCNC: 26.4 MMOL/L (ref 22–29)
CREAT SERPL-MCNC: 0.68 MG/DL (ref 0.57–1)
EGFRCR SERPLBLD CKD-EPI 2021: 92.1 ML/MIN/1.73
GLOBULIN SER CALC-MCNC: 2.8 GM/DL
GLUCOSE SERPL-MCNC: 123 MG/DL (ref 65–99)
HBA1C MFR BLD: 5.9 % (ref 4.8–5.6)
HDLC SERPL-MCNC: 36 MG/DL (ref 40–60)
LDLC SERPL CALC-MCNC: 64 MG/DL (ref 0–100)
POTASSIUM SERPL-SCNC: 3.6 MMOL/L (ref 3.5–5.2)
PROT SERPL-MCNC: 7.1 G/DL (ref 6–8.5)
SODIUM SERPL-SCNC: 142 MMOL/L (ref 136–145)
TRIGL SERPL-MCNC: 221 MG/DL (ref 0–150)
TSH SERPL DL<=0.005 MIU/L-ACNC: 1.04 UIU/ML (ref 0.27–4.2)
VLDLC SERPL CALC-MCNC: 36 MG/DL (ref 5–40)

## 2023-05-01 ENCOUNTER — TELEPHONE (OUTPATIENT)
Dept: CARDIOLOGY | Facility: CLINIC | Age: 73
End: 2023-05-01
Payer: MEDICARE

## 2023-05-01 NOTE — TELEPHONE ENCOUNTER
Faxed sc clearance request back to [facility] attn Whit at [phone]. Received fax confirmation.     Chrystal Caldwell MA  5/1/23 [id number]

## 2023-05-11 NOTE — TELEPHONE ENCOUNTER
Rx Refill Note  Requested Prescriptions     Pending Prescriptions Disp Refills   • metFORMIN (GLUCOPHAGE) 500 MG tablet [Pharmacy Med Name: metFORMIN  MG TABLET] 180 tablet 1     Sig: TAKE ONE TABLET BY MOUTH TWICE A DAY      Last office visit with prescribing clinician: 2/13/2023   Last telemedicine visit with prescribing clinician: 2/13/2023   Next office visit with prescribing clinician: 8/14/2023                         Would you like a call back once the refill request has been completed: [] Yes [] No    If the office needs to give you a call back, can they leave a voicemail: [] Yes [] No    Mimi Plasencia MA  05/11/23, 10:44 EDT

## 2023-08-07 RX ORDER — CLOPIDOGREL BISULFATE 75 MG/1
TABLET ORAL
Qty: 90 TABLET | Refills: 3 | Status: SHIPPED | OUTPATIENT
Start: 2023-08-07

## 2023-08-14 ENCOUNTER — OFFICE VISIT (OUTPATIENT)
Dept: INTERNAL MEDICINE | Facility: CLINIC | Age: 73
End: 2023-08-14
Payer: MEDICARE

## 2023-08-14 VITALS
HEART RATE: 71 BPM | SYSTOLIC BLOOD PRESSURE: 126 MMHG | TEMPERATURE: 97.1 F | DIASTOLIC BLOOD PRESSURE: 62 MMHG | WEIGHT: 150 LBS | OXYGEN SATURATION: 100 % | HEIGHT: 65 IN | BODY MASS INDEX: 24.99 KG/M2 | RESPIRATION RATE: 16 BRPM

## 2023-08-14 DIAGNOSIS — F17.219 NICOTINE DEPENDENCE, CIGARETTES, WITH UNSPECIFIED NICOTINE-INDUCED DISORDERS: ICD-10-CM

## 2023-08-14 DIAGNOSIS — E55.9 VITAMIN D DEFICIENCY: ICD-10-CM

## 2023-08-14 DIAGNOSIS — F17.209 NICOTINE DEPENDENCE WITH NICOTINE-INDUCED DISORDER, UNSPECIFIED NICOTINE PRODUCT TYPE: ICD-10-CM

## 2023-08-14 DIAGNOSIS — E78.2 MIXED HYPERLIPIDEMIA: ICD-10-CM

## 2023-08-14 DIAGNOSIS — Z00.00 ROUTINE GENERAL MEDICAL EXAMINATION AT A HEALTH CARE FACILITY: Primary | ICD-10-CM

## 2023-08-14 DIAGNOSIS — E11.59 TYPE 2 DIABETES MELLITUS WITH OTHER CIRCULATORY COMPLICATION, WITHOUT LONG-TERM CURRENT USE OF INSULIN: ICD-10-CM

## 2023-08-14 DIAGNOSIS — I25.119 CORONARY ARTERY DISEASE INVOLVING NATIVE CORONARY ARTERY OF NATIVE HEART WITH ANGINA PECTORIS: ICD-10-CM

## 2023-08-14 PROCEDURE — G0439 PPPS, SUBSEQ VISIT: HCPCS | Performed by: INTERNAL MEDICINE

## 2023-08-14 PROCEDURE — 1170F FXNL STATUS ASSESSED: CPT | Performed by: INTERNAL MEDICINE

## 2023-08-14 PROCEDURE — 3044F HG A1C LEVEL LT 7.0%: CPT | Performed by: INTERNAL MEDICINE

## 2023-08-14 PROCEDURE — 3074F SYST BP LT 130 MM HG: CPT | Performed by: INTERNAL MEDICINE

## 2023-08-14 PROCEDURE — 96160 PT-FOCUSED HLTH RISK ASSMT: CPT | Performed by: INTERNAL MEDICINE

## 2023-08-14 PROCEDURE — 3078F DIAST BP <80 MM HG: CPT | Performed by: INTERNAL MEDICINE

## 2023-08-14 NOTE — PROGRESS NOTES
The ABCs of the Annual Wellness Visit  Subsequent Medicare Wellness Visit    Subjective      Tonya Mccall is a 73 y.o. female who presents for a Subsequent Medicare Wellness Visit.    The following portions of the patient's history were reviewed and   updated as appropriate: allergies, current medications, past family history, past medical history, past social history, past surgical history, and problem list.    Compared to one year ago, the patient feels her physical   health is the same.    Compared to one year ago, the patient feels her mental   health is the same.    Recent Hospitalizations:  She was not admitted to the hospital during the last year.       Current Medical Providers:  Patient Care Team:  Raymond Simon MD (Tony) as PCP - General (Internal Medicine)  Ac Bansal III, MD as Consulting Physician (Cardiology)    Outpatient Medications Prior to Visit   Medication Sig Dispense Refill    aspirin 81 MG tablet Take 1 tablet by mouth Daily.      atorvastatin (LIPITOR) 40 MG tablet TAKE ONE TABLET BY MOUTH ONCE NIGHTLY 90 tablet 3    carvedilol (COREG) 6.25 MG tablet Take 1 tablet by mouth 2 (Two) Times a Day. 180 tablet 3    clopidogrel (PLAVIX) 75 MG tablet TAKE ONE TABLET BY MOUTH DAILY 90 tablet 3    Jardiance 10 MG tablet tablet TAKE ONE TABLET BY MOUTH DAILY 30 tablet 12    losartan (Cozaar) 25 MG tablet Take 0.5 tablets by mouth Daily. 90 tablet 2    metFORMIN (GLUCOPHAGE) 500 MG tablet TAKE ONE TABLET BY MOUTH TWICE A  tablet 4    nitroglycerin (NITROSTAT) 0.4 MG SL tablet Place 1 tablet under the tongue Every 5 (Five) Minutes As Needed for Chest Pain (1-3 doses for chest pain). Take no more than 3 doses in 15 minutes. 25 tablet 3    risedronate (Actonel) 35 MG tablet Take 1 tablet by mouth Every 7 (Seven) Days. with water on empty stomach, nothing by mouth or lie down for next 30 minutes. 12 tablet 4     No facility-administered medications prior to visit.       No opioid  "medication identified on active medication list. I have reviewed chart for other potential  high risk medication/s and harmful drug interactions in the elderly.        Aspirin is on active medication list. Aspirin use is indicated based on review of current medical condition/s. Pros and cons of this therapy have been discussed today. Benefits of this medication outweigh potential harm.  Patient has been encouraged to continue taking this medication.  .      Patient Active Problem List   Diagnosis    Trichomonas vaginitis    Ischemic cardiomyopathy    Mixed hyperlipidemia    Type 2 diabetes mellitus with circulatory disorder    Coronary artery disease involving native coronary artery of native heart without angina pectoris    COPD (chronic obstructive pulmonary disease)    GERD (gastroesophageal reflux disease)    Arthritis of left hip    Osteoarthritis of left hip    Aortic valve sclerosis    Primary hypertension    Nonrheumatic mitral valve regurgitation     Advance Care Planning   Advance Care Planning     Advance Directive is not on file.  ACP discussion was held with the patient during this visit. Patient has an advance directive (not in EMR), copy requested.     Objective    Vitals:    08/14/23 1032   BP: 126/62   BP Location: Left arm   Patient Position: Sitting   Cuff Size: Large Adult   Pulse: 71   Resp: 16   Temp: 97.1 øF (36.2 øC)   TempSrc: Temporal   SpO2: 100%   Weight: 68 kg (150 lb)   Height: 165.1 cm (65\")     Estimated body mass index is 24.96 kg/mý as calculated from the following:    Height as of this encounter: 165.1 cm (65\").    Weight as of this encounter: 68 kg (150 lb).    BMI is within normal parameters. No other follow-up for BMI required.      Does the patient have evidence of cognitive impairment?   No            HEALTH RISK ASSESSMENT    Smoking Status:  Social History     Tobacco Use   Smoking Status Former    Packs/day: 1.00    Years: 15.00    Pack years: 15.00    Types: Cigarettes    " Quit date: 2021    Years since quittin.0   Smokeless Tobacco Never   Tobacco Comments    used since early      Alcohol Consumption:  Social History     Substance and Sexual Activity   Alcohol Use No     Fall Risk Screen:    REHANA Fall Risk Assessment was completed, and patient is at LOW risk for falls.Assessment completed on:2023    Depression Screenin/14/2023    10:35 AM   PHQ-2/PHQ-9 Depression Screening   Little Interest or Pleasure in Doing Things 0-->not at all   Feeling Down, Depressed or Hopeless 0-->not at all   PHQ-9: Brief Depression Severity Measure Score 0       Health Habits and Functional and Cognitive Screenin/14/2023    10:34 AM   Functional & Cognitive Status   Do you have difficulty preparing food and eating? No   Do you have difficulty bathing yourself, getting dressed or grooming yourself? No   Do you have difficulty using the toilet? No   Do you have difficulty moving around from place to place? No   Do you have trouble with steps or getting out of a bed or a chair? No   Current Diet Unhealthy Diet   Dental Exam Not up to date   Eye Exam Up to date   Exercise (times per week) 0 times per week   Current Exercises Include No Regular Exercise   Do you need help using the phone?  No   Are you deaf or do you have serious difficulty hearing?  No   Do you need help to go to places out of walking distance? No   Do you need help shopping? No   Do you need help preparing meals?  No   Do you need help with housework?  No   Do you need help with laundry? No   Do you need help taking your medications? No   Do you need help managing money? No   Do you ever drive or ride in a car without wearing a seat belt? Yes   Have you felt unusual stress, anger or loneliness in the last month? No   Who do you live with? Spouse   If you need help, do you have trouble finding someone available to you? No   Have you been bothered in the last four weeks by sexual problems? No   Do you have  difficulty concentrating, remembering or making decisions? No       Age-appropriate Screening Schedule:  Refer to the list below for future screening recommendations based on patient's age, sex and/or medical conditions. Orders for these recommended tests are listed in the plan section. The patient has been provided with a written plan.    Health Maintenance   Topic Date Due    ZOSTER VACCINE (1 of 2) Never done    COVID-19 Vaccine (4 - Pfizer series) 03/10/2022    DIABETIC FOOT EXAM  03/30/2023    URINE MICROALBUMIN  03/30/2023    ANNUAL WELLNESS VISIT  08/01/2023    HEMOGLOBIN A1C  08/13/2023    INFLUENZA VACCINE  10/01/2023    DIABETIC EYE EXAM  12/12/2023    LIPID PANEL  02/13/2024    TDAP/TD VACCINES (2 - Td or Tdap) 06/01/2024    MAMMOGRAM  08/17/2024    DXA SCAN  02/03/2025    COLORECTAL CANCER SCREENING  04/12/2025    HEPATITIS C SCREENING  Completed    Pneumococcal Vaccine 65+  Completed                  CMS Preventative Services Quick Reference  Risk Factors Identified During Encounter:    Immunizations Discussed/Encouraged: Shingrix    The above risks/problems have been discussed with the patient.  Pertinent information has been shared with the patient in the After Visit Summary.    Diagnoses and all orders for this visit:    1. Routine general medical examination at a health care facility (Primary)    2. Type 2 diabetes mellitus with other circulatory complication, without long-term current use of insulin  -     Urinalysis With Microscopic - Urine, Clean Catch  -     MicroAlbumin, Urine, Random - Urine, Clean Catch  -     CBC & Differential  -     Comprehensive Metabolic Panel  -     Lipid Panel With / Chol / HDL Ratio  -     TSH  -     Hemoglobin A1c  -     Vitamin D,25-Hydroxy    3. Mixed hyperlipidemia  -     Urinalysis With Microscopic - Urine, Clean Catch  -     MicroAlbumin, Urine, Random - Urine, Clean Catch  -     CBC & Differential  -     Comprehensive Metabolic Panel  -     Lipid Panel With /  Chol / HDL Ratio  -     TSH  -     Hemoglobin A1c  -     Vitamin D,25-Hydroxy    4. Coronary artery disease involving native coronary artery of native heart with angina pectoris  -     Urinalysis With Microscopic - Urine, Clean Catch  -     MicroAlbumin, Urine, Random - Urine, Clean Catch  -     CBC & Differential  -     Comprehensive Metabolic Panel  -     Lipid Panel With / Chol / HDL Ratio  -     TSH  -     Hemoglobin A1c  -     Vitamin D,25-Hydroxy    5. Vitamin D deficiency  -     Urinalysis With Microscopic - Urine, Clean Catch  -     MicroAlbumin, Urine, Random - Urine, Clean Catch  -     CBC & Differential  -     Comprehensive Metabolic Panel  -     Lipid Panel With / Chol / HDL Ratio  -     TSH  -     Hemoglobin A1c  -     Vitamin D,25-Hydroxy    6. Nicotine dependence with nicotine-induced disorder, unspecified nicotine product type    Overall she is doing pretty well.  No specific complaints.  Stable coronary disease and diabetes.  Recheck lab work and urine.  Schedule CT chest for lung cancer screening in September.  Otherwise follow-up pending lab results or 6 months    Follow Up:   Next Medicare Wellness visit to be scheduled in 1 year.      An After Visit Summary and PPPS were made available to the patient.

## 2023-08-15 LAB
25(OH)D3+25(OH)D2 SERPL-MCNC: 28.9 NG/ML (ref 30–100)
ALBUMIN SERPL-MCNC: 4.3 G/DL (ref 3.5–5.2)
ALBUMIN/GLOB SERPL: 1.7 G/DL
ALP SERPL-CCNC: 58 U/L (ref 39–117)
ALT SERPL-CCNC: 13 U/L (ref 1–33)
APPEARANCE UR: CLEAR
AST SERPL-CCNC: 13 U/L (ref 1–32)
BACTERIA #/AREA URNS HPF: ABNORMAL /HPF
BASOPHILS # BLD AUTO: 0.06 10*3/MM3 (ref 0–0.2)
BASOPHILS NFR BLD AUTO: 0.7 % (ref 0–1.5)
BILIRUB SERPL-MCNC: 0.3 MG/DL (ref 0–1.2)
BILIRUB UR QL STRIP: NEGATIVE
BUN SERPL-MCNC: 8 MG/DL (ref 8–23)
BUN/CREAT SERPL: 13.1 (ref 7–25)
CALCIUM SERPL-MCNC: 9.3 MG/DL (ref 8.6–10.5)
CHLORIDE SERPL-SCNC: 105 MMOL/L (ref 98–107)
CHOLEST SERPL-MCNC: 123 MG/DL (ref 0–200)
CHOLEST/HDLC SERPL: 3.24 {RATIO}
CO2 SERPL-SCNC: 26.5 MMOL/L (ref 22–29)
COLOR UR: YELLOW
CREAT SERPL-MCNC: 0.61 MG/DL (ref 0.57–1)
EGFRCR SERPLBLD CKD-EPI 2021: 93.9 ML/MIN/1.73
EOSINOPHIL # BLD AUTO: 0.19 10*3/MM3 (ref 0–0.4)
EOSINOPHIL NFR BLD AUTO: 2.1 % (ref 0.3–6.2)
EPI CELLS #/AREA URNS HPF: ABNORMAL /HPF
ERYTHROCYTE [DISTWIDTH] IN BLOOD BY AUTOMATED COUNT: 15.5 % (ref 12.3–15.4)
GLOBULIN SER CALC-MCNC: 2.6 GM/DL
GLUCOSE SERPL-MCNC: 101 MG/DL (ref 65–99)
GLUCOSE UR QL STRIP: ABNORMAL
HBA1C MFR BLD: 6.2 % (ref 4.8–5.6)
HCT VFR BLD AUTO: 37.6 % (ref 34–46.6)
HDLC SERPL-MCNC: 38 MG/DL (ref 40–60)
HGB BLD-MCNC: 11.5 G/DL (ref 12–15.9)
HGB UR QL STRIP: NEGATIVE
IMM GRANULOCYTES # BLD AUTO: 0.03 10*3/MM3 (ref 0–0.05)
IMM GRANULOCYTES NFR BLD AUTO: 0.3 % (ref 0–0.5)
KETONES UR QL STRIP: ABNORMAL
LDLC SERPL CALC-MCNC: 58 MG/DL (ref 0–100)
LEUKOCYTE ESTERASE UR QL STRIP: ABNORMAL
LYMPHOCYTES # BLD AUTO: 3.29 10*3/MM3 (ref 0.7–3.1)
LYMPHOCYTES NFR BLD AUTO: 35.8 % (ref 19.6–45.3)
MCH RBC QN AUTO: 24.5 PG (ref 26.6–33)
MCHC RBC AUTO-ENTMCNC: 30.6 G/DL (ref 31.5–35.7)
MCV RBC AUTO: 80.2 FL (ref 79–97)
MICROALBUMIN UR-MCNC: 13.8 UG/ML
MONOCYTES # BLD AUTO: 0.6 10*3/MM3 (ref 0.1–0.9)
MONOCYTES NFR BLD AUTO: 6.5 % (ref 5–12)
NEUTROPHILS # BLD AUTO: 5.03 10*3/MM3 (ref 1.7–7)
NEUTROPHILS NFR BLD AUTO: 54.6 % (ref 42.7–76)
NITRITE UR QL STRIP: NEGATIVE
NRBC BLD AUTO-RTO: 0 /100 WBC (ref 0–0.2)
PH UR STRIP: 5.5 [PH] (ref 5–8)
PLATELET # BLD AUTO: 252 10*3/MM3 (ref 140–450)
POTASSIUM SERPL-SCNC: 3.6 MMOL/L (ref 3.5–5.2)
PROT SERPL-MCNC: 6.9 G/DL (ref 6–8.5)
PROT UR QL STRIP: ABNORMAL
RBC # BLD AUTO: 4.69 10*6/MM3 (ref 3.77–5.28)
RBC #/AREA URNS HPF: ABNORMAL /HPF
SODIUM SERPL-SCNC: 143 MMOL/L (ref 136–145)
SP GR UR STRIP: ABNORMAL (ref 1–1.03)
TRIGL SERPL-MCNC: 159 MG/DL (ref 0–150)
TSH SERPL DL<=0.005 MIU/L-ACNC: 1.82 UIU/ML (ref 0.27–4.2)
UROBILINOGEN UR STRIP-MCNC: ABNORMAL MG/DL
VLDLC SERPL CALC-MCNC: 27 MG/DL (ref 5–40)
WBC # BLD AUTO: 9.2 10*3/MM3 (ref 3.4–10.8)
WBC #/AREA URNS HPF: ABNORMAL /HPF
YEAST #/AREA URNS HPF: ABNORMAL /HPF

## 2023-08-17 LAB
FERRITIN SERPL-MCNC: 8.4 NG/ML (ref 13–150)
IRON SATN MFR SERPL: 6 % (ref 20–50)
IRON SERPL-MCNC: 31 MCG/DL (ref 37–145)
TIBC SERPL-MCNC: 490 MCG/DL
UIBC SERPL-MCNC: 459 MCG/DL (ref 112–346)
VIT B12 SERPL-MCNC: 172 PG/ML (ref 211–946)
WRITTEN AUTHORIZATION: NORMAL

## 2023-08-23 ENCOUNTER — TELEPHONE (OUTPATIENT)
Dept: INTERNAL MEDICINE | Facility: CLINIC | Age: 73
End: 2023-08-23

## 2023-08-23 ENCOUNTER — OFFICE VISIT (OUTPATIENT)
Dept: INTERNAL MEDICINE | Facility: CLINIC | Age: 73
End: 2023-08-23
Payer: MEDICARE

## 2023-08-23 VITALS
HEIGHT: 65 IN | TEMPERATURE: 97.5 F | BODY MASS INDEX: 25.16 KG/M2 | HEART RATE: 84 BPM | SYSTOLIC BLOOD PRESSURE: 102 MMHG | RESPIRATION RATE: 16 BRPM | OXYGEN SATURATION: 96 % | WEIGHT: 151 LBS | DIASTOLIC BLOOD PRESSURE: 58 MMHG

## 2023-08-23 DIAGNOSIS — E53.8 B12 DEFICIENCY: Primary | ICD-10-CM

## 2023-08-23 DIAGNOSIS — D50.9 IRON DEFICIENCY ANEMIA, UNSPECIFIED IRON DEFICIENCY ANEMIA TYPE: ICD-10-CM

## 2023-08-23 RX ORDER — CYANOCOBALAMIN 1000 UG/ML
1000 INJECTION, SOLUTION INTRAMUSCULAR; SUBCUTANEOUS
Status: SHIPPED | OUTPATIENT
Start: 2023-08-23

## 2023-08-23 RX ORDER — FERROUS SULFATE 324(65)MG
324 TABLET, DELAYED RELEASE (ENTERIC COATED) ORAL
Qty: 30 TABLET | Refills: 1 | Status: SHIPPED | OUTPATIENT
Start: 2023-08-23

## 2023-08-23 RX ADMIN — CYANOCOBALAMIN 1000 MCG: 1000 INJECTION, SOLUTION INTRAMUSCULAR; SUBCUTANEOUS at 11:20

## 2023-08-23 NOTE — PROGRESS NOTES
"Chief Complaint  Follow-up    Subjective        Tonya Mccall presents to [facility]  History of Present Illness  She is here for follow-up of her lab work last week.  Mild B12 deficiency and mild iron deficiency with a mild anemia.  No obvious symptoms of blood loss.  She had a negative Cologuard in 2022.  Objective   Vital Signs:  /58 (BP Location: Left arm, Patient Position: Sitting, Cuff Size: Large Adult)   Pulse 84   Temp 97.5 øF (36.4 øC) (Temporal)   Resp 16   Ht 165.1 cm (65\")   Wt 68.5 kg (151 lb)   SpO2 96%   BMI 25.13 kg/mý   Estimated body mass index is 25.13 kg/mý as calculated from the following:    Height as of this encounter: 165.1 cm (65\").    Weight as of this encounter: 68.5 kg (151 lb).       BMI is within normal parameters. No other follow-up for BMI required.      Physical Exam  Vitals and nursing note reviewed.   Constitutional:       Appearance: Normal appearance.   HENT:      Head: Normocephalic and atraumatic.   Cardiovascular:      Rate and Rhythm: Normal rate and regular rhythm.   Pulmonary:      Effort: Pulmonary effort is normal.      Breath sounds: Normal breath sounds.   Abdominal:      General: Abdomen is flat.      Palpations: Abdomen is soft.   Musculoskeletal:         General: No swelling or deformity.      Cervical back: Neck supple.      Right lower leg: No edema.      Left lower leg: No edema.   Feet:      Comments: Diabetic Foot Exam Performed     Skin:     General: Skin is warm.      Capillary Refill: Capillary refill takes less than 2 seconds.      Findings: No rash.   Neurological:      General: No focal deficit present.      Mental Status: She is alert and oriented to person, place, and time.      Sensory: Sensory deficit present.      Result Review :        Previous labs reviewed           Assessment and Plan   Diagnoses and all orders for this visit:    1. B12 deficiency (Primary)    2. Iron deficiency " anemia, unspecified iron deficiency anemia type    B12 deficiency.  B12 1000 mcg weekly for 4 doses and then 200 mcg monthly.  Mild iron deficiency as well.  Ferrous sulfate 325 mg daily.  Scheduled EGD and colonoscopy to rule out GI loss.  Follow-up in 2 months or sooner problems         Follow Up   No follow-ups on file.  Patient was given instructions and counseling regarding her condition or for health maintenance advice. Please see specific information pulled into the AVS if appropriate.

## 2023-08-30 ENCOUNTER — CLINICAL SUPPORT (OUTPATIENT)
Dept: INTERNAL MEDICINE | Facility: CLINIC | Age: 73
End: 2023-08-30
Payer: MEDICARE

## 2023-08-30 DIAGNOSIS — E53.8 VITAMIN B12 DEFICIENCY: Primary | ICD-10-CM

## 2023-08-30 PROCEDURE — 96372 THER/PROPH/DIAG INJ SC/IM: CPT | Performed by: INTERNAL MEDICINE

## 2023-08-30 RX ADMIN — CYANOCOBALAMIN 1000 MCG: 1000 INJECTION, SOLUTION INTRAMUSCULAR; SUBCUTANEOUS at 11:10

## 2023-09-06 ENCOUNTER — CLINICAL SUPPORT (OUTPATIENT)
Dept: INTERNAL MEDICINE | Facility: CLINIC | Age: 73
End: 2023-09-06
Payer: MEDICARE

## 2023-09-06 DIAGNOSIS — E53.8 B12 DEFICIENCY: Primary | ICD-10-CM

## 2023-09-06 RX ADMIN — CYANOCOBALAMIN 1000 MCG: 1000 INJECTION, SOLUTION INTRAMUSCULAR; SUBCUTANEOUS at 15:19

## 2023-09-10 RX ORDER — LOSARTAN POTASSIUM 25 MG/1
TABLET ORAL
Qty: 45 TABLET | Refills: 4 | Status: SHIPPED | OUTPATIENT
Start: 2023-09-10

## 2023-09-13 ENCOUNTER — CLINICAL SUPPORT (OUTPATIENT)
Dept: INTERNAL MEDICINE | Facility: CLINIC | Age: 73
End: 2023-09-13
Payer: MEDICARE

## 2023-09-13 DIAGNOSIS — E53.9 VITAMIN B DEFICIENCY: Primary | ICD-10-CM

## 2023-09-13 PROCEDURE — 96372 THER/PROPH/DIAG INJ SC/IM: CPT | Performed by: INTERNAL MEDICINE

## 2023-09-13 RX ADMIN — CYANOCOBALAMIN 1000 MCG: 1000 INJECTION, SOLUTION INTRAMUSCULAR; SUBCUTANEOUS at 11:17

## 2023-09-25 RX ORDER — CARVEDILOL 6.25 MG/1
TABLET ORAL
Qty: 180 TABLET | Refills: 0 | Status: SHIPPED | OUTPATIENT
Start: 2023-09-25

## 2023-10-16 ENCOUNTER — CLINICAL SUPPORT (OUTPATIENT)
Dept: INTERNAL MEDICINE | Facility: CLINIC | Age: 73
End: 2023-10-16
Payer: MEDICARE

## 2023-10-16 DIAGNOSIS — E53.8 B12 DEFICIENCY: Primary | ICD-10-CM

## 2023-10-16 RX ORDER — FERROUS SULFATE TAB EC 324 MG (65 MG FE EQUIVALENT) 324 (65 FE) MG
324 TABLET DELAYED RESPONSE ORAL
Qty: 30 TABLET | Refills: 1 | Status: SHIPPED | OUTPATIENT
Start: 2023-10-16

## 2023-10-16 RX ADMIN — CYANOCOBALAMIN 1000 MCG: 1000 INJECTION, SOLUTION INTRAMUSCULAR; SUBCUTANEOUS at 11:24

## 2023-10-16 NOTE — TELEPHONE ENCOUNTER
Rx Refill Note  Requested Prescriptions     Pending Prescriptions Disp Refills    ferrous sulfate 324 (65 Fe) MG tablet delayed-release EC tablet [Pharmacy Med Name: FERROUS SULF  MG TABLET] 30 tablet 1     Sig: TAKE 1 TABLET BY MOUTH DAILY WITH BREAKFAST.      Last office visit with prescribing clinician: 8/23/2023   Last telemedicine visit with prescribing clinician: Visit date not found   Next office visit with prescribing clinician: Visit date not found                         Would you like a call back once the refill request has been completed: [] Yes [] No    If the office needs to give you a call back, can they leave a voicemail: [] Yes [] No    Maribell Guerrero MA  10/16/23, 09:34 EDT

## 2023-11-03 ENCOUNTER — TELEPHONE (OUTPATIENT)
Dept: CARDIOLOGY | Facility: CLINIC | Age: 73
End: 2023-11-03

## 2023-11-03 NOTE — TELEPHONE ENCOUNTER
Caller: Tonya Mccall    Relationship to patient: Self    Best call back number: [phone] (home)      Type of visit: FOLLOW UP     Requested date: ASAP      If rescheduling, when is the original appointment: 10.27.23     Additional notes: PT IS NEEDING TO R/S APPT FROM 10.27.23 WITH DR SEXTON, SCHEDULED PT WITH [facility] ON 11.16.23 PT GAVE OK TO SEE APRN, PLEASE ADVISE IF PT IS OKAY TO SEE APRN.

## 2023-11-06 NOTE — TELEPHONE ENCOUNTER
LOV: 10/25/2022 with Dr. Bansal  Next OV: 11/16/2023 with Maggie Bansal,  The patient had to cancel appointment with you on 10/27/23.  Appears HUB rescheduled to 11/16 with TK and wanted to make sure this is acceptable.  Prior to closing out this encounter, wanted to verify with you that it is ok for patient to see TK?    Please let me know how you would like to proceed.    Thank you,  Naz CALDWELL RN  Triage Nurse [facility] [place]   12:44 EST

## 2023-11-16 ENCOUNTER — CLINICAL SUPPORT (OUTPATIENT)
Dept: INTERNAL MEDICINE | Facility: CLINIC | Age: 73
End: 2023-11-16
Payer: MEDICARE

## 2023-11-16 DIAGNOSIS — E53.8 VITAMIN B12 DEFICIENCY: Primary | ICD-10-CM

## 2023-11-16 RX ADMIN — CYANOCOBALAMIN 1000 MCG: 1000 INJECTION, SOLUTION INTRAMUSCULAR; SUBCUTANEOUS at 11:26

## 2023-12-04 ENCOUNTER — TELEPHONE (OUTPATIENT)
Dept: INTERNAL MEDICINE | Facility: CLINIC | Age: 73
End: 2023-12-04

## 2023-12-04 NOTE — TELEPHONE ENCOUNTER
Caller: Tonya Mccall    Relationship: Self    Best call back number:     [phone]       What was the call regardin2015  INFORMATION OF PNEUMOCOCCAL VACCINE.  PLEASE FAX INFO TO:  [facility]  [phone]    [facility] SHOWS A BREAKIN INFORMATION FROM [phone].  HUB GAVE PATIENT MAIN MEDICAL RECORDS # FOR BH

## 2024-08-14 NOTE — PROGRESS NOTES
"Chief Complaint  Diabetes, Hypertension, and Hyperlipidemia    Subjective        Tonya Mccall presents to [facility]  History of Present Illness  Intentional weight loss with diet over the past several months..  No polyuria or polydipsia or visual changes.  No chest pains or shortness of breath.  Taking medication as prescribed.  No side effects reported.  Unfortunately she had to do this and extended family over the past couple weeks pretty tragic in nature.  She feels like she is handling grieving process okay.  Objective   Vital Signs:  /70 (BP Location: Left arm, Patient Position: Sitting, Cuff Size: Large Adult)   Pulse 75   Temp 96.8 °F (36 °C) (Temporal)   Resp 16   Ht 165.1 cm (65\")   Wt 67.6 kg (149 lb)   SpO2 99%   BMI 24.79 kg/m²   Estimated body mass index is 24.79 kg/m² as calculated from the following:    Height as of this encounter: 165.1 cm (65\").    Weight as of this encounter: 67.6 kg (149 lb).       BMI is within normal parameters. No other follow-up for BMI required.      Physical Exam  Vitals and nursing note reviewed.   Constitutional:       Appearance: Normal appearance.   HENT:      Head: Normocephalic and atraumatic.   Cardiovascular:      Rate and Rhythm: Normal rate and regular rhythm.   Pulmonary:      Effort: Pulmonary effort is normal.      Breath sounds: Normal breath sounds.   Abdominal:      General: Abdomen is flat.      Palpations: Abdomen is soft.   Musculoskeletal:         General: No swelling or deformity.      Cervical back: Neck supple.      Right lower leg: No edema.      Left lower leg: No edema.   Skin:     General: Skin is warm.      Capillary Refill: Capillary refill takes less than 2 seconds.      Findings: No rash.   Neurological:      General: No focal deficit present.      Mental Status: She is alert and oriented to person, place, and time.   Psychiatric:         Mood and Affect: Mood normal.         " AMBULATORY CASE MANAGEMENT NOTE    Names and Relationships of Patient/Support Persons: Contact: [facility] Pt assistance; Relationship: Other -     Care Coordination    Called for patient assistance about her inhalers. Per [employer] when they did finance research it pulled income of $67,000 and for her house hold it needs to be $61,320 or less. She reported she can send in the social security letter that states how much income the household makes and if they have that they can do a manual adjustment and mail our her inhalers within 6 to 10 business days.     [facility] Interim Update    Patient informed of the above and will bring in her and his 2024 SS letters from January to office next week.         Education Documentation  No documentation found.        Samira BRITO  Ambulatory Case Management    8/14/2024, 14:08 EDT   Behavior: Behavior normal.         Thought Content: Thought content normal.         Judgment: Judgment normal.        Result Review :        Previous labs reviewed           Assessment and Plan   Diagnoses and all orders for this visit:    1. Type 2 diabetes mellitus with other circulatory complication, without long-term current use of insulin (HCC) (Primary)  -     Comprehensive Metabolic Panel  -     Hemoglobin A1c  -     Lipid Panel With / Chol / HDL Ratio  -     TSH    2. Mixed hyperlipidemia  -     Comprehensive Metabolic Panel  -     Hemoglobin A1c  -     Lipid Panel With / Chol / HDL Ratio  -     TSH    Type 2 diabetes with intentional weight loss on diet.  Repeat labs and follow-up in 6 months or sooner if problems.  She was not interested in doing the flu shot today.  We talked about shingles vaccine at the pharmacy.  Robin completed in 2022.  Recheck 6 months for wellness         Follow Up   Return in about 6 months (around 8/13/2023) for Medicare Wellness.  Patient was given instructions and counseling regarding her condition or for health maintenance advice. Please see specific information pulled into the follow-up in 6 months if appropriate.

## 2024-08-25 NOTE — TELEPHONE ENCOUNTER
Prior Auth denied.   Pt called insurance company 72 hour appeal for climara(generic patch)  Ref#[id number]  [phone]      Menopause  Trich    The patient has been re-examined and I agree with the above assessment or I updated with my findings.

## (undated) DEVICE — CATH DIAG IMPULSE FL3.5 5F 100CM

## (undated) DEVICE — INTRO SHEATH ART/FEM ENGAGE .035 6F12CM

## (undated) DEVICE — KT MANIFLD CARDIAC

## (undated) DEVICE — CATH DIAG IMPULSE FR4 5F 100CM

## (undated) DEVICE — GUIDELINER CATHETERS ARE INTENDED TO BE USED IN CONJUNCTION WITH GUIDE CATHETERS TO ACCESS DISCRETE REGIONS OF THE CORONARY AND/OR PERIPHERAL VASCULATURE, AND TO FACILITATE PLACEMENT OF INTERVENTIONAL DEVICES.: Brand: GUIDELINER® V3 CATHETER

## (undated) DEVICE — GW HITORQUE/BAL MID/WT J W/HCOAT .014 3X190CM

## (undated) DEVICE — DEV INDEFLATOR

## (undated) DEVICE — Device

## (undated) DEVICE — GLIDESHEATH BASIC HYDROPHILIC COATED INTRODUCER SHEATH: Brand: GLIDESHEATH

## (undated) DEVICE — 6F .070 JL3.5 100CM: Brand: CORDIS

## (undated) DEVICE — FR5 INFINITI MULTIPAC: Brand: INFINITI

## (undated) DEVICE — CATH4F INF PIG 145Â° MOD 110CM: Brand: INFINITI

## (undated) DEVICE — CATH VENT MIV RADL PIG ST TIP 4F 110CM

## (undated) DEVICE — PK CATH CARD 40

## (undated) DEVICE — HI-TORQUE WHISPER MS GUIDE WIRE .014 STRAIGHT TIP 3.0 CM X 190 CM: Brand: HI-TORQUE WHISPER

## (undated) DEVICE — NC TREK CORONARY DILATATION CATHETER 3.0 MM X 20 MM / RAPID-EXCHANGE: Brand: NC TREK

## (undated) DEVICE — TREK CORONARY DILATATION CATHETER 3.0 MM X 15 MM / RAPID-EXCHANGE: Brand: TREK

## (undated) DEVICE — GUIDE CATHETER: Brand: MACH1™

## (undated) DEVICE — GW EMR FIX EXCHG J STD .035 3MM 260CM

## (undated) DEVICE — TREK CORONARY DILATATION CATHETER 3.0 MM X 20 MM / RAPID-EXCHANGE: Brand: TREK

## (undated) DEVICE — TR BAND RADIAL ARTERY COMPRESSION DEVICE: Brand: TR BAND

## (undated) DEVICE — ANGIO-SEAL VIP VASCULAR CLOSURE DEVICE: Brand: ANGIO-SEAL

## (undated) DEVICE — DEV INDEFLATOR P/N 580289